# Patient Record
Sex: FEMALE | Race: WHITE | URBAN - METROPOLITAN AREA
[De-identification: names, ages, dates, MRNs, and addresses within clinical notes are randomized per-mention and may not be internally consistent; named-entity substitution may affect disease eponyms.]

---

## 2012-01-12 LAB — LEFT VENTRICULAR EJECTION FRACTION, EXTERNAL: 65

## 2017-01-09 RX ORDER — GABAPENTIN 300 MG/1
CAPSULE ORAL
Qty: 180 CAPSULE | Refills: 2 | Status: SHIPPED | OUTPATIENT
Start: 2017-01-09 | End: 2017-04-07 | Stop reason: SDUPTHER

## 2017-01-17 ENCOUNTER — TELEPHONE (OUTPATIENT)
Dept: FAMILY MEDICINE CLINIC | Age: 82
End: 2017-01-17

## 2017-01-20 ENCOUNTER — TELEPHONE (OUTPATIENT)
Dept: FAMILY MEDICINE CLINIC | Age: 82
End: 2017-01-20

## 2017-01-20 ENCOUNTER — HOSPITAL ENCOUNTER (OUTPATIENT)
Dept: VASCULAR LAB | Age: 82
Discharge: HOME | End: 2017-01-20
Attending: FAMILY MEDICINE | Admitting: FAMILY MEDICINE

## 2017-01-20 DIAGNOSIS — M79.605 PAIN OF LEFT LEG: ICD-10-CM

## 2017-01-20 DIAGNOSIS — M79.605 PAIN IN BOTH LOWER EXTREMITIES: Primary | ICD-10-CM

## 2017-01-20 DIAGNOSIS — M79.604 PAIN IN BOTH LOWER EXTREMITIES: Primary | ICD-10-CM

## 2017-01-20 DIAGNOSIS — I73.9 CLAUDICATION (HCC): Primary | ICD-10-CM

## 2017-01-20 DIAGNOSIS — I73.9 CLAUDICATION, INTERMITTENT (HCC): ICD-10-CM

## 2017-04-07 RX ORDER — GABAPENTIN 300 MG/1
CAPSULE ORAL
Qty: 180 CAPSULE | Refills: 1 | Status: SHIPPED | OUTPATIENT
Start: 2017-04-07 | End: 2017-06-15 | Stop reason: SDUPTHER

## 2017-05-03 RX ORDER — TEMAZEPAM 15 MG/1
CAPSULE ORAL
Qty: 30 CAPSULE | Refills: 1 | OUTPATIENT
Start: 2017-05-03 | End: 2017-07-09 | Stop reason: SDUPTHER

## 2017-05-04 ENCOUNTER — TELEPHONE (OUTPATIENT)
Dept: FAMILY MEDICINE CLINIC | Age: 82
End: 2017-05-04

## 2017-05-29 ENCOUNTER — PATIENT MESSAGE (OUTPATIENT)
Dept: RHEUMATOLOGY | Age: 82
End: 2017-05-29

## 2017-06-15 ENCOUNTER — OFFICE VISIT (OUTPATIENT)
Dept: RHEUMATOLOGY | Age: 82
End: 2017-06-15

## 2017-06-15 VITALS
WEIGHT: 169.4 LBS | BODY MASS INDEX: 28.54 KG/M2 | DIASTOLIC BLOOD PRESSURE: 78 MMHG | HEART RATE: 58 BPM | SYSTOLIC BLOOD PRESSURE: 118 MMHG

## 2017-06-15 DIAGNOSIS — M15.9 OSTEOARTHRITIS OF MULTIPLE JOINTS, UNSPECIFIED OSTEOARTHRITIS TYPE: ICD-10-CM

## 2017-06-15 DIAGNOSIS — G60.9 IDIOPATHIC PERIPHERAL NEUROPATHY: Primary | ICD-10-CM

## 2017-06-15 PROCEDURE — 99213 OFFICE O/P EST LOW 20 MIN: CPT | Performed by: INTERNAL MEDICINE

## 2017-06-15 RX ORDER — GABAPENTIN 300 MG/1
600 CAPSULE ORAL 3 TIMES DAILY
Qty: 180 CAPSULE | Refills: 5 | Status: SHIPPED | OUTPATIENT
Start: 2017-06-15 | End: 2018-01-15 | Stop reason: SDUPTHER

## 2017-06-15 RX ORDER — GABAPENTIN 100 MG/1
200 CAPSULE ORAL 3 TIMES DAILY
Qty: 180 CAPSULE | Refills: 5 | Status: SHIPPED | OUTPATIENT
Start: 2017-06-15 | End: 2017-09-01 | Stop reason: DRUGHIGH

## 2017-06-15 RX ORDER — DEXAMETHASONE 0.5 MG/5ML
ELIXIR ORAL 3 TIMES DAILY
COMMUNITY
End: 2017-12-07 | Stop reason: ALTCHOICE

## 2017-07-06 ENCOUNTER — TELEPHONE (OUTPATIENT)
Dept: FAMILY MEDICINE CLINIC | Age: 82
End: 2017-07-06

## 2017-07-13 RX ORDER — TEMAZEPAM 15 MG/1
CAPSULE ORAL
Qty: 30 CAPSULE | Refills: 0 | OUTPATIENT
Start: 2017-07-13 | End: 2017-09-01 | Stop reason: SDUPTHER

## 2017-07-24 ENCOUNTER — OFFICE VISIT (OUTPATIENT)
Dept: RHEUMATOLOGY | Age: 82
End: 2017-07-24

## 2017-07-24 VITALS
HEART RATE: 72 BPM | WEIGHT: 173.38 LBS | DIASTOLIC BLOOD PRESSURE: 76 MMHG | SYSTOLIC BLOOD PRESSURE: 130 MMHG | HEIGHT: 65 IN | BODY MASS INDEX: 28.89 KG/M2

## 2017-07-24 DIAGNOSIS — M15.9 OSTEOARTHRITIS OF MULTIPLE JOINTS, UNSPECIFIED OSTEOARTHRITIS TYPE: Primary | ICD-10-CM

## 2017-07-24 DIAGNOSIS — G60.9 IDIOPATHIC PERIPHERAL NEUROPATHY: ICD-10-CM

## 2017-07-24 PROCEDURE — 99213 OFFICE O/P EST LOW 20 MIN: CPT | Performed by: INTERNAL MEDICINE

## 2017-08-07 ENCOUNTER — PATIENT MESSAGE (OUTPATIENT)
Dept: RHEUMATOLOGY | Age: 82
End: 2017-08-07

## 2017-08-08 RX ORDER — DULOXETIN HYDROCHLORIDE 30 MG/1
30 CAPSULE, DELAYED RELEASE ORAL DAILY
Qty: 30 CAPSULE | Refills: 3 | Status: SHIPPED | OUTPATIENT
Start: 2017-08-08 | End: 2017-08-25 | Stop reason: SDUPTHER

## 2017-08-17 ENCOUNTER — PATIENT MESSAGE (OUTPATIENT)
Dept: RHEUMATOLOGY | Age: 82
End: 2017-08-17

## 2017-08-24 ENCOUNTER — PATIENT MESSAGE (OUTPATIENT)
Dept: RHEUMATOLOGY | Age: 82
End: 2017-08-24

## 2017-08-25 RX ORDER — DULOXETIN HYDROCHLORIDE 60 MG/1
60 CAPSULE, DELAYED RELEASE ORAL DAILY
Qty: 30 CAPSULE | Refills: 1 | Status: SHIPPED | OUTPATIENT
Start: 2017-08-25 | End: 2017-10-25 | Stop reason: SDUPTHER

## 2017-09-12 ENCOUNTER — HOSPITAL ENCOUNTER (OUTPATIENT)
Dept: OTHER | Age: 82
Discharge: HOME | End: 2017-09-12
Attending: FAMILY MEDICINE | Admitting: FAMILY MEDICINE

## 2017-09-12 DIAGNOSIS — G62.9 NEUROPATHY: ICD-10-CM

## 2017-09-17 ENCOUNTER — PATIENT MESSAGE (OUTPATIENT)
Dept: RHEUMATOLOGY | Age: 82
End: 2017-09-17

## 2017-10-11 ENCOUNTER — PATIENT MESSAGE (OUTPATIENT)
Dept: RHEUMATOLOGY | Age: 82
End: 2017-10-11

## 2017-10-11 DIAGNOSIS — M25.562 CHRONIC PAIN OF LEFT KNEE: Primary | ICD-10-CM

## 2017-10-11 DIAGNOSIS — G89.29 CHRONIC PAIN OF LEFT KNEE: Primary | ICD-10-CM

## 2017-10-11 NOTE — TELEPHONE ENCOUNTER
From: Lorelei Meals  To: Eli Denver, MD  Sent: 10/11/2017 2:29 PM EDT  Subject: Non-Urgent Medical Question     Good and bad   The good is that my legs and ankles have very little pain since i have been taking 60mg of Cymbalta a day  But my legs, ankles and feet still swell at times, but do not hurt   The bad is that I have pain on the right side of my left knee. Hurts especially when I first stand up to walk,   but as I walk the pain goes away a little bit. What can I do about the swelling in my legs? What can I do for my knee?    Thank you, Brooklyn So

## 2017-10-17 ENCOUNTER — HOSPITAL ENCOUNTER (OUTPATIENT)
Dept: OTHER | Age: 82
Discharge: HOME | End: 2017-10-17
Attending: INTERNAL MEDICINE | Admitting: INTERNAL MEDICINE

## 2017-10-17 DIAGNOSIS — M25.562 CHRONIC PAIN OF LEFT KNEE: ICD-10-CM

## 2017-10-17 DIAGNOSIS — G89.29 CHRONIC PAIN OF LEFT KNEE: ICD-10-CM

## 2017-10-25 RX ORDER — DULOXETIN HYDROCHLORIDE 60 MG/1
CAPSULE, DELAYED RELEASE ORAL
Qty: 30 CAPSULE | Refills: 1 | Status: SHIPPED | OUTPATIENT
Start: 2017-10-25 | End: 2017-12-13 | Stop reason: SDUPTHER

## 2017-11-30 RX ORDER — LORAZEPAM 0.5 MG/1
TABLET ORAL
Qty: 60 TABLET | Refills: 0 | OUTPATIENT
Start: 2017-11-30 | End: 2018-01-03 | Stop reason: SDUPTHER

## 2017-11-30 RX ORDER — TEMAZEPAM 15 MG/1
CAPSULE ORAL
Qty: 30 CAPSULE | Refills: 0 | OUTPATIENT
Start: 2017-11-30 | End: 2018-01-03 | Stop reason: SDUPTHER

## 2017-12-02 ENCOUNTER — TELEPHONE (OUTPATIENT)
Dept: FAMILY MEDICINE CLINIC | Age: 82
End: 2017-12-02

## 2017-12-02 RX ORDER — LATANOPROST 50 UG/ML
1 SOLUTION/ DROPS OPHTHALMIC NIGHTLY
Qty: 1 BOTTLE | Refills: 1 | Status: SHIPPED | OUTPATIENT
Start: 2017-12-02 | End: 2021-06-15 | Stop reason: ALTCHOICE

## 2017-12-02 RX ORDER — LORAZEPAM 0.5 MG/1
TABLET ORAL
Qty: 180 TABLET | Refills: 0 | Status: CANCELLED | OUTPATIENT
Start: 2017-12-02

## 2017-12-02 RX ORDER — TIMOLOL MALEATE 6.8 MG/ML
1 SOLUTION/ DROPS OPHTHALMIC DAILY
Qty: 1 BOTTLE | Refills: 1 | Status: SHIPPED | OUTPATIENT
Start: 2017-12-02 | End: 2018-05-17 | Stop reason: SDUPTHER

## 2017-12-02 RX ORDER — TEMAZEPAM 15 MG/1
CAPSULE ORAL
Qty: 90 CAPSULE | Refills: 0 | Status: CANCELLED | OUTPATIENT
Start: 2017-12-02

## 2017-12-07 ENCOUNTER — OFFICE VISIT (OUTPATIENT)
Dept: FAMILY MEDICINE CLINIC | Age: 82
End: 2017-12-07

## 2017-12-07 VITALS
HEART RATE: 62 BPM | DIASTOLIC BLOOD PRESSURE: 74 MMHG | SYSTOLIC BLOOD PRESSURE: 150 MMHG | WEIGHT: 166.8 LBS | BODY MASS INDEX: 28.1 KG/M2 | OXYGEN SATURATION: 96 %

## 2017-12-07 DIAGNOSIS — I10 ESSENTIAL HYPERTENSION: Primary | ICD-10-CM

## 2017-12-07 DIAGNOSIS — M15.9 OSTEOARTHRITIS OF MULTIPLE JOINTS, UNSPECIFIED OSTEOARTHRITIS TYPE: ICD-10-CM

## 2017-12-07 DIAGNOSIS — I48.19 PERSISTENT ATRIAL FIBRILLATION (HCC): ICD-10-CM

## 2017-12-07 DIAGNOSIS — H91.90 HEARING LOSS, UNSPECIFIED HEARING LOSS TYPE, UNSPECIFIED LATERALITY: ICD-10-CM

## 2017-12-07 DIAGNOSIS — E78.5 HYPERLIPIDEMIA, UNSPECIFIED HYPERLIPIDEMIA TYPE: ICD-10-CM

## 2017-12-07 PROCEDURE — 99214 OFFICE O/P EST MOD 30 MIN: CPT | Performed by: FAMILY MEDICINE

## 2017-12-07 RX ORDER — BRIMONIDINE TARTRATE 2 MG/ML
SOLUTION/ DROPS OPHTHALMIC
COMMUNITY
Start: 2017-10-26 | End: 2018-09-06

## 2017-12-07 ASSESSMENT — PATIENT HEALTH QUESTIONNAIRE - PHQ9
1. LITTLE INTEREST OR PLEASURE IN DOING THINGS: 0
2. FEELING DOWN, DEPRESSED OR HOPELESS: 0
SUM OF ALL RESPONSES TO PHQ9 QUESTIONS 1 & 2: 0
SUM OF ALL RESPONSES TO PHQ QUESTIONS 1-9: 0

## 2017-12-07 NOTE — PROGRESS NOTES
Patient is here for a follow-up with a concern about knee pain. Patient states that she has pain in both knee for two months. Patient is currently taking tylenol that provides no relief.
release capsule TAKE ONE CAPSULE BY MOUTH DAILY 30 capsule 1    PRADAXA 150 MG capsule TAKE 1 CAPSULE TWICE DAILY 180 capsule 0    amLODIPine (NORVASC) 5 MG tablet TAKE 1 TABLET DAILY 90 tablet 0    gabapentin (NEURONTIN) 300 MG capsule Take 2 capsules by mouth 3 times daily (Patient taking differently: Take 800 mg by mouth 3 times daily ) 180 capsule 5    triamterene-hydrochlorothiazide (MAXZIDE) 75-50 MG per tablet TAKE 1 TABLET DAILY 90 tablet 1    pravastatin (PRAVACHOL) 10 MG tablet TAKE ONE TABLET BY MOUTH DAILY 90 tablet 3    dorzolamide (TRUSOPT) 2 % ophthalmic solution Place 1 drop into the left eye 2 times daily      potassium chloride (MICRO-K) 10 MEQ extended release capsule TAKE 1 CAPSULE TWICE DAILY 180 capsule 0    acetaminophen (TYLENOL) 500 MG tablet Take 1,000 mg by mouth three times daily      TURMERIC PO Take 1 tablet by mouth daily. 800 mg      Alpha-Lipoic Acid 300 MG TABS Take 2 tablets by mouth 2 times daily.  Magnesium 500 MG TABS Take 1 tablet by mouth daily.  Lutein 40 MG CAPS Take 1 capsule by mouth daily.  cetirizine (ZYRTEC) 10 MG tablet Take 10 mg by mouth daily.  Jodee 500 MG CAPS Take 1 capsule by mouth daily.  Ascorbic Acid (VITAMIN C) 500 MG CAPS Take 1 capsule by mouth daily.  Glucosamine-Chondroit-Vit C-Mn (GLUCOSAMINE CHONDR 500 COMPLEX PO) Take  by mouth.  VITAMIN D, CHOLECALCIFEROL, PO Take 1 tablet by mouth daily. 1000 mg      Omega-3 Fatty Acids (FISH OIL) 1000 MG CAPS Take 1,000 mg by mouth daily.  L-LYSINE HCL Take 1 tablet by mouth daily. 500 mg      CRANBERRY Take 1 tablet by mouth daily. 4200 mg      Coenzyme Q10 (COQ-10 PO) Take  by mouth.  CHROMIUM PICOLINATE Take 1 tablet by mouth daily. 200 mcg      MULTIPLE VITAMIN PO Take  by mouth.            Allergies   Allergen Reactions    Ace Inhibitors     Catapres [Clonidine Hcl]      patch    Diovan [Valsartan]      ineffective    Eszopiclone      ineffective

## 2017-12-13 ENCOUNTER — OFFICE VISIT (OUTPATIENT)
Dept: RHEUMATOLOGY | Age: 82
End: 2017-12-13

## 2017-12-13 VITALS
HEART RATE: 64 BPM | BODY MASS INDEX: 27.82 KG/M2 | WEIGHT: 167 LBS | SYSTOLIC BLOOD PRESSURE: 142 MMHG | HEIGHT: 65 IN | DIASTOLIC BLOOD PRESSURE: 70 MMHG

## 2017-12-13 DIAGNOSIS — G90.09 PERIPHERAL AUTONOMIC NEUROPATHY OF UNKNOWN CAUSE: Primary | ICD-10-CM

## 2017-12-13 DIAGNOSIS — M15.9 OSTEOARTHRITIS OF MULTIPLE JOINTS, UNSPECIFIED OSTEOARTHRITIS TYPE: ICD-10-CM

## 2017-12-13 PROCEDURE — 99213 OFFICE O/P EST LOW 20 MIN: CPT | Performed by: INTERNAL MEDICINE

## 2017-12-13 RX ORDER — DULOXETIN HYDROCHLORIDE 60 MG/1
60 CAPSULE, DELAYED RELEASE ORAL DAILY
Qty: 90 CAPSULE | Refills: 1 | Status: SHIPPED | OUTPATIENT
Start: 2017-12-13 | End: 2018-05-05 | Stop reason: SDUPTHER

## 2017-12-13 NOTE — PROGRESS NOTES
Subjective:      Patient ID: Viola Estevez is a 80 y.o. female. HPI  The patient returns for follow-up of osteoarthritis and peripheral neuropathy. She's doing better on Cymbalta 60 mg a day along with Neurontin 1200 mg a day. Review of Systems  Occasional right thumb pain. Prior to Visit Medications    Medication Sig Taking? Authorizing Provider   amLODIPine (NORVASC) 5 MG tablet TAKE 1 TABLET DAILY Yes Jason Dove MD   DULoxetine (CYMBALTA) 60 MG extended release capsule Take 1 capsule by mouth daily Yes Bhumika Deleon MD   brimonidine (ALPHAGAN) 0.2 % ophthalmic solution  Yes Historical Provider, MD   apixaban (ELIQUIS) 2.5 MG TABS tablet Take 1 tablet by mouth 2 times daily Yes Steele Severe, MD   Timolol (ISTALOL) 0.5 % (DAILY) SOLN ophthalmic solution Apply 1 drop to eye daily Left eye Yes Jason Dove MD   latanoprost (XALATAN) 0.005 % ophthalmic solution Place 1 drop into the left eye nightly Yes Jason Dove MD   temazepam (RESTORIL) 15 MG capsule TAKE ONE CAPSULE BY MOUTH EVERY NIGHT AT BEDTIME. Yes Jason Dove MD   LORazepam (ATIVAN) 0.5 MG tablet TAKE ONE TABLET BY MOUTH TWICE A DAY AS NEEDED.  Yes Jason Dove MD   KLOR-CON SPRINKLE 10 MEQ extended release capsule TAKE 1 CAPSULE TWICE DAILY (DUE FOR APPOINTMENT) Yes Jason Dove MD   metoprolol tartrate (LOPRESSOR) 25 MG tablet TAKE 1 TABLET TWICE A DAY Yes Jason Dove MD   gabapentin (NEURONTIN) 300 MG capsule Take 2 capsules by mouth 3 times daily  Patient taking differently: Take 800 mg by mouth 3 times daily Taking 1200 mg daily Yes Bhumika Deleon MD   triamterene-hydrochlorothiazide (MAXZIDE) 75-50 MG per tablet TAKE 1 TABLET DAILY Yes Jason Dove MD   pravastatin (PRAVACHOL) 10 MG tablet TAKE ONE TABLET BY MOUTH DAILY Yes Jason Dove MD   dorzolamide (TRUSOPT) 2 % ophthalmic solution Place 1 drop into the left eye 2 times daily Yes Historical Provider, MD   potassium chloride (MICRO-K) 10 MEQ extended release capsule TAKE 1 CAPSULE TWICE DAILY Yes Traci Banuelos MD   acetaminophen (TYLENOL) 500 MG tablet Take 1,000 mg by mouth three times daily Yes Historical Provider, MD   TURMERIC PO Take 1 tablet by mouth daily. 800 mg Yes Historical Provider, MD   Alpha-Lipoic Acid 300 MG TABS Take 2 tablets by mouth 2 times daily. Yes Historical Provider, MD   Magnesium 500 MG TABS Take 1 tablet by mouth daily. Yes Historical Provider, MD   Lutein 40 MG CAPS Take 1 capsule by mouth daily. Yes Historical Provider, MD   cetirizine (ZYRTEC) 10 MG tablet Take 10 mg by mouth daily. Yes Historical Provider, MD   Jodee 500 MG CAPS Take 1 capsule by mouth daily. Yes Historical Provider, MD   Ascorbic Acid (VITAMIN C) 500 MG CAPS Take 1 capsule by mouth daily. Yes Historical Provider, MD   Glucosamine-Chondroit-Vit C-Mn (GLUCOSAMINE CHONDR 500 COMPLEX PO) Take  by mouth. Yes Historical Provider, MD   VITAMIN D, CHOLECALCIFEROL, PO Take 1 tablet by mouth daily. 1000 mg Yes Historical Provider, MD   Omega-3 Fatty Acids (FISH OIL) 1000 MG CAPS Take 1,000 mg by mouth daily. Yes Historical Provider, MD   L-LYSINE HCL Take 1 tablet by mouth daily. 500 mg Yes Historical Provider, MD   CRANBERRY Take 1 tablet by mouth daily. 4200 mg Yes Historical Provider, MD   Coenzyme Q10 (COQ-10 PO) Take  by mouth. Yes Historical Provider, MD   CHROMIUM PICOLINATE Take 1 tablet by mouth daily. 200 mcg Yes Historical Provider, MD   MULTIPLE VITAMIN PO Take  by mouth. Yes Historical Provider, MD       Objective:   Physical Exam  BP (!) 154/72   Pulse 64   Ht 5' 4.6\" (1.641 m)   Wt 167 lb (75.8 kg)   BMI 28.14 kg/m²   Alert female in no acute distress prominent osteoarthritic changes DIPs and PIPs minimal crepitus at both knees  Assessment:      Osteoarthritis. Peripheral neuropathy      Plan:      The patient will continue current medication. I'll see the patient back in 6 months time.

## 2018-01-15 RX ORDER — GABAPENTIN 300 MG/1
CAPSULE ORAL
Qty: 180 CAPSULE | Refills: 5 | Status: SHIPPED | OUTPATIENT
Start: 2018-01-15 | End: 2018-04-10 | Stop reason: CLARIF

## 2018-02-09 ENCOUNTER — HOSPITAL ENCOUNTER (OUTPATIENT)
Dept: OTHER | Age: 83
Discharge: HOME | End: 2018-02-09
Attending: FAMILY MEDICINE | Admitting: FAMILY MEDICINE

## 2018-02-09 ENCOUNTER — OFFICE VISIT (OUTPATIENT)
Dept: FAMILY MEDICINE CLINIC | Age: 83
End: 2018-02-09

## 2018-02-09 VITALS
DIASTOLIC BLOOD PRESSURE: 78 MMHG | OXYGEN SATURATION: 96 % | RESPIRATION RATE: 12 BRPM | HEART RATE: 74 BPM | WEIGHT: 186 LBS | SYSTOLIC BLOOD PRESSURE: 136 MMHG | BODY MASS INDEX: 30.95 KG/M2

## 2018-02-09 DIAGNOSIS — S92.355A CLOSED NONDISPLACED FRACTURE OF FIFTH METATARSAL BONE OF LEFT FOOT, INITIAL ENCOUNTER: Primary | ICD-10-CM

## 2018-02-09 DIAGNOSIS — S00.03XA CONTUSION OF SCALP, INITIAL ENCOUNTER: ICD-10-CM

## 2018-02-09 DIAGNOSIS — S96.912A STRAIN OF LEFT FOOT, INITIAL ENCOUNTER: Primary | ICD-10-CM

## 2018-02-09 DIAGNOSIS — S96.912A STRAIN OF LEFT FOOT, INITIAL ENCOUNTER: ICD-10-CM

## 2018-02-09 PROCEDURE — 99213 OFFICE O/P EST LOW 20 MIN: CPT | Performed by: FAMILY MEDICINE

## 2018-02-09 NOTE — PROGRESS NOTES
Subjective:      Patient ID: Michelle Fontanez is a 80 y.o. female.     HPI    Review of Systems    Objective:   Physical Exam    Assessment:      ***      Plan:      ***

## 2018-02-10 ENCOUNTER — TELEPHONE (OUTPATIENT)
Dept: FAMILY MEDICINE CLINIC | Age: 83
End: 2018-02-10

## 2018-02-12 DIAGNOSIS — F41.9 ANXIETY: ICD-10-CM

## 2018-02-12 RX ORDER — TEMAZEPAM 15 MG/1
CAPSULE ORAL
Qty: 30 CAPSULE | Refills: 0 | Status: SHIPPED | OUTPATIENT
Start: 2018-02-12 | End: 2018-03-22 | Stop reason: SDUPTHER

## 2018-02-28 DIAGNOSIS — F41.9 ANXIETY: ICD-10-CM

## 2018-03-01 RX ORDER — LORAZEPAM 0.5 MG/1
TABLET ORAL
Qty: 60 TABLET | Refills: 0 | Status: SHIPPED | OUTPATIENT
Start: 2018-03-01 | End: 2018-03-07

## 2018-03-03 ENCOUNTER — PATIENT MESSAGE (OUTPATIENT)
Dept: FAMILY MEDICINE CLINIC | Age: 83
End: 2018-03-03

## 2018-03-05 NOTE — TELEPHONE ENCOUNTER
From: Ronaldo Brice  To: Katelin Rodriguez MD  Sent: 3/3/2018 3:36 PM EST  Subject: Non-Urgent Medical Question    Was cutting my toe nails, and cut the skin, some bleeding,next day my nurse friend dressed it very good. She thinks I should have a tetnus shot, I'm not so sure, it was in my house. What do you think?  Thank you, Zeny Medina

## 2018-03-07 ENCOUNTER — HOSPITAL ENCOUNTER (OUTPATIENT)
Dept: OTHER | Age: 83
Discharge: HOME | End: 2018-03-07
Attending: FAMILY MEDICINE | Admitting: FAMILY MEDICINE

## 2018-03-07 ENCOUNTER — OFFICE VISIT (OUTPATIENT)
Dept: FAMILY MEDICINE CLINIC | Age: 83
End: 2018-03-07

## 2018-03-07 VITALS
BODY MASS INDEX: 28.06 KG/M2 | SYSTOLIC BLOOD PRESSURE: 150 MMHG | OXYGEN SATURATION: 96 % | HEART RATE: 64 BPM | DIASTOLIC BLOOD PRESSURE: 82 MMHG | WEIGHT: 168.6 LBS

## 2018-03-07 DIAGNOSIS — S91.114A: ICD-10-CM

## 2018-03-07 DIAGNOSIS — G47.10 HYPERSOMNOLENCE: ICD-10-CM

## 2018-03-07 DIAGNOSIS — S92.902D CLOSED FRACTURE OF LEFT FOOT WITH ROUTINE HEALING, SUBSEQUENT ENCOUNTER: Primary | ICD-10-CM

## 2018-03-07 DIAGNOSIS — S92.355A CLOSED NONDISPLACED FRACTURE OF FIFTH METATARSAL BONE OF LEFT FOOT, INITIAL ENCOUNTER: ICD-10-CM

## 2018-03-07 LAB
A/G RATIO: 1.2 (ref 1.1–2.2)
ALBUMIN SERPL-MCNC: 4 G/DL (ref 3.4–5)
ALP BLD-CCNC: 117 U/L (ref 40–129)
ALT SERPL-CCNC: 13 U/L (ref 10–40)
ANION GAP SERPL CALCULATED.3IONS-SCNC: 11 MMOL/L (ref 3–16)
AST SERPL-CCNC: 24 U/L (ref 15–37)
BILIRUB SERPL-MCNC: 0.5 MG/DL (ref 0–1)
BUN BLDV-MCNC: 20 MG/DL (ref 7–20)
CALCIUM SERPL-MCNC: 8.9 MG/DL (ref 8.3–10.6)
CHLORIDE BLD-SCNC: 110 MMOL/L (ref 99–110)
CO2: 28 MMOL/L (ref 21–32)
CREAT SERPL-MCNC: 0.5 MG/DL (ref 0.6–1.2)
GFR AFRICAN AMERICAN: >60
GFR NON-AFRICAN AMERICAN: >60
GLOBULIN: 3.4 G/DL
GLUCOSE BLD-MCNC: 128 MG/DL (ref 70–99)
HCT VFR BLD CALC: 38.7 % (ref 36–48)
HEMOGLOBIN: 13.3 G/DL (ref 12–16)
MCH RBC QN AUTO: 32.4 PG (ref 26–34)
MCHC RBC AUTO-ENTMCNC: 34.4 G/DL (ref 31–36)
MCV RBC AUTO: 94.2 FL (ref 80–100)
PDW BLD-RTO: 13.7 % (ref 12.4–15.4)
PLATELET # BLD: 186 K/UL (ref 135–450)
PMV BLD AUTO: 10.1 FL (ref 5–10.5)
POTASSIUM SERPL-SCNC: 4.3 MMOL/L (ref 3.5–5.1)
RBC # BLD: 4.11 M/UL (ref 4–5.2)
SODIUM BLD-SCNC: 149 MMOL/L (ref 136–145)
TOTAL PROTEIN: 7.4 G/DL (ref 6.4–8.2)
WBC # BLD: 9.3 K/UL (ref 4–11)

## 2018-03-07 PROCEDURE — 90714 TD VACC NO PRESV 7 YRS+ IM: CPT | Performed by: FAMILY MEDICINE

## 2018-03-07 PROCEDURE — 90471 IMMUNIZATION ADMIN: CPT | Performed by: FAMILY MEDICINE

## 2018-03-07 PROCEDURE — 99214 OFFICE O/P EST MOD 30 MIN: CPT | Performed by: FAMILY MEDICINE

## 2018-03-07 NOTE — PROGRESS NOTES
Subjective:      Patient ID: Bobo Maldonado is a 80 y.o. female. HPI patient presents in accompaniment of son. Patient was to have a repeat x-ray of her left foot fracture. She continued with her boot. Patient is having issues with feeling very forgetful, sleeping all the time, feeling very tired, no energy, and not wanting to do things since her fall. Patient is also having some blurred vision too. Patient typically goes to bed about 10 PM and at a.m. awakens with some eyedrops in an angle back to sleep for another couple hours. Patient takes both lorazepam and temazepam at bedtime. Patient states she's been on these medications for a number of years. She also takes a daytime nap or several hours. Patient cut her right 2nd toe about one week ago. Patient states the toe is healing and doing well. She is requesting a tetanus injection. Review of Systems     Allergies   Allergen Reactions    Ace Inhibitors     Catapres [Clonidine Hcl]      patch    Diovan [Valsartan]      ineffective    Eszopiclone      ineffective    Morphine      All derivatives- GI upset         Objective:   Physical Exam   Constitutional: She is oriented to person, place, and time. She appears well-developed and well-nourished. No distress. Cardiovascular: Normal rate and normal heart sounds. No murmur heard. Pulmonary/Chest: Effort normal and breath sounds normal.   Neurological: She is alert and oriented to person, place, and time. Skin:   Left second toe with flap-like laceration that appears to be healing well. Psychiatric: She has a normal mood and affect. Her behavior is normal. Judgment and thought content normal.       Assessment:      Alton Bailon was seen today for fatigue.     Diagnoses and all orders for this visit:    Closed fracture of left foot with routine healing, subsequent encounter  -     Td vaccine preservative free greater than or equal to 6yo IM    Laceration of lesser toe of right foot without foreign body without damage to nail, initial encounter   -     Td vaccine preservative free greater than or equal to 6yo IM    Hypersomnolence            Plan:      Proceed with x-ray of the foot and laboratory evaluation  Patient is in agreement to stop lorazepam but she would like to maintain temazepam  Discussed with patient and son that she is overmedicated especially for her age with benzodiazepine medication and it would really be best that she not be on either of these medications. Patient does have a follow-up appointment later this week to review her health   Encourage podiatry for toenail trimming  Total time of consultation 25 minutes. Please note that this chart was generated using Dragon dictation software. Although every effort was made to ensure the accuracy of this automated transcription, some errors in transcription may have occurred.

## 2018-03-22 ENCOUNTER — OFFICE VISIT (OUTPATIENT)
Dept: FAMILY MEDICINE CLINIC | Age: 83
End: 2018-03-22

## 2018-03-22 VITALS
BODY MASS INDEX: 27.72 KG/M2 | DIASTOLIC BLOOD PRESSURE: 78 MMHG | OXYGEN SATURATION: 96 % | SYSTOLIC BLOOD PRESSURE: 152 MMHG | HEART RATE: 72 BPM | WEIGHT: 166.6 LBS

## 2018-03-22 DIAGNOSIS — G47.00 INSOMNIA, UNSPECIFIED TYPE: ICD-10-CM
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
## 2018-03-22 DIAGNOSIS — G62.9 NEUROPATHY: ICD-10-CM

## 2018-03-22 DIAGNOSIS — R44.3 HALLUCINATIONS: ICD-10-CM

## 2018-03-22 DIAGNOSIS — Z79.899 POLYPHARMACY: Primary | ICD-10-CM
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
## 2018-03-22 DIAGNOSIS — F41.9 ANXIETY: ICD-10-CM

## 2018-03-22 PROCEDURE — 99213 OFFICE O/P EST LOW 20 MIN: CPT | Performed by: FAMILY MEDICINE

## 2018-03-22 RX ORDER — TEMAZEPAM 15 MG/1
CAPSULE ORAL
Qty: 30 CAPSULE | Refills: 1 | Status: SHIPPED | OUTPATIENT
Start: 2018-03-22 | End: 2018-05-21 | Stop reason: SDUPTHER

## 2018-03-22 NOTE — PROGRESS NOTES
Subjective:      Patient ID: Hussain Krueger is a 80 y.o. female. HPI   Patient presents today to discuss medications. She is concerned that she is on too many medications. Stopped lorazepam 3/7/2018. Able to sleep taking restoril alone. Not taking any lorazepam during the day. On Neurontin 300mg q am and 300mg q pm and 600mg nightly bid for neuropathy. Previously managed by rheumatology. Also on Cymbalta 60mg nightly for neuropathy. Concerned because she thinks Cymbalta is giving her hallucinations. Has had hallucinations of \"shadows\" over the last few months. Sometimes feels like \"there are people in the room\" and sometimes thinks that there is an upstairs to her house but knows that there is not. Has been having more falls at home and son is concerned that Neurontin and Cymbalta are contributing. Feet actually feel better since wearing supportive shoes around the house. Mouth gets very dry.       Review of Systems    Patient Active Problem List   Diagnosis    Herniated nucleus pulposus with myelopathy, cervical    Spinal stenosis of lumbar region    Eustachian tube disorder    Carpal tunnel syndrome    Glaucoma    Essential hypertension    Osteoarthritis    Glaucoma    Hyperlipidemia    Dizziness    Peripheral autonomic neuropathy of unknown cause    Atrial fibrillation (HCC)    [place] (hard of hearing)       Outpatient Prescriptions Marked as Taking for the 3/22/18 encounter (Office Visit) with Margie Pepe MD   Medication Sig Dispense Refill    KLOR-CON SPRINKLE 10 MEQ extended release capsule TAKE 1 CAPSULE TWICE DAILY (DUE FOR APPOINTMENT) 180 capsule 1    metoprolol tartrate (LOPRESSOR) 25 MG tablet TAKE 1 TABLET TWICE A  tablet 1    pravastatin (PRAVACHOL) 10 MG tablet TAKE ONE TABLET BY MOUTH DAILY 90 tablet 1    gabapentin (NEURONTIN) 300 MG capsule TAKE TWO CAPSULES BY MOUTH THREE TIMES A DAY (Patient taking differently: Taking 1 tablet two times daily and two tablets at bedtime) 180 capsule 5    amLODIPine (NORVASC) 5 MG tablet TAKE 1 TABLET DAILY 90 tablet 3    DULoxetine (CYMBALTA) 60 MG extended release capsule Take 1 capsule by mouth daily 90 capsule 1    brimonidine (ALPHAGAN) 0.2 % ophthalmic solution       apixaban (ELIQUIS) 2.5 MG TABS tablet Take 1 tablet by mouth 2 times daily 180 tablet 3    Timolol (ISTALOL) 0.5 % (DAILY) SOLN ophthalmic solution Apply 1 drop to eye daily Left eye 1 Bottle 1    latanoprost (XALATAN) 0.005 % ophthalmic solution Place 1 drop into the left eye nightly 1 Bottle 1    triamterene-hydrochlorothiazide (MAXZIDE) 75-50 MG per tablet TAKE 1 TABLET DAILY 90 tablet 1    dorzolamide (TRUSOPT) 2 % ophthalmic solution Place 1 drop into the left eye 2 times daily      acetaminophen (TYLENOL) 500 MG tablet Take 1,000 mg by mouth three times daily      TURMERIC PO Take 1 tablet by mouth daily. 800 mg      Alpha-Lipoic Acid 300 MG TABS Take 2 tablets by mouth 2 times daily.  Magnesium 500 MG TABS Take 1 tablet by mouth daily.  Lutein 40 MG CAPS Take 1 capsule by mouth daily.  Jodee 500 MG CAPS Take 1 capsule by mouth daily.  Ascorbic Acid (VITAMIN C) 500 MG CAPS Take 1 capsule by mouth daily.  Glucosamine-Chondroit-Vit C-Mn (GLUCOSAMINE CHONDR 500 COMPLEX PO) Take  by mouth.  VITAMIN D, CHOLECALCIFEROL, PO Take 1 tablet by mouth daily. 1000 mg      Omega-3 Fatty Acids (FISH OIL) 1000 MG CAPS Take 1,000 mg by mouth daily.  L-LYSINE HCL Take 1 tablet by mouth daily. 500 mg      CRANBERRY Take 1 tablet by mouth daily. 4200 mg      Coenzyme Q10 (COQ-10 PO) Take  by mouth.  CHROMIUM PICOLINATE Take 1 tablet by mouth daily. 200 mcg      MULTIPLE VITAMIN PO Take  by mouth.            Allergies   Allergen Reactions    Ace Inhibitors     Catapres [Clonidine Hcl]      patch    Diovan [Valsartan]      ineffective    Eszopiclone      ineffective    Morphine      All derivatives- GI

## 2018-03-29 ENCOUNTER — PATIENT MESSAGE (OUTPATIENT)
Dept: FAMILY MEDICINE CLINIC | Age: 83
End: 2018-03-29

## 2018-03-29 NOTE — TELEPHONE ENCOUNTER
Suspect that this is due to tapering off of the gabapentin. If in pain, would recommend resuming it as she was previously taking. See if that adequately controls pain.

## 2018-04-10 ENCOUNTER — OFFICE VISIT (OUTPATIENT)
Dept: FAMILY MEDICINE CLINIC | Age: 83
End: 2018-04-10

## 2018-04-10 VITALS
DIASTOLIC BLOOD PRESSURE: 72 MMHG | OXYGEN SATURATION: 95 % | BODY MASS INDEX: 27.19 KG/M2 | WEIGHT: 163.4 LBS | HEART RATE: 56 BPM | SYSTOLIC BLOOD PRESSURE: 150 MMHG

## 2018-04-10 DIAGNOSIS — G90.09 PERIPHERAL AUTONOMIC NEUROPATHY OF UNKNOWN CAUSE: Primary | ICD-10-CM

## 2018-04-10 DIAGNOSIS — H53.9 VISION CHANGES: ICD-10-CM

## 2018-04-10 DIAGNOSIS — Z79.899 POLYPHARMACY: ICD-10-CM

## 2018-04-10 LAB — GLUCOSE BLD-MCNC: 141 MG/DL

## 2018-04-10 PROCEDURE — 99213 OFFICE O/P EST LOW 20 MIN: CPT | Performed by: FAMILY MEDICINE

## 2018-04-10 PROCEDURE — 82962 GLUCOSE BLOOD TEST: CPT | Performed by: FAMILY MEDICINE

## 2018-05-07 RX ORDER — DULOXETIN HYDROCHLORIDE 60 MG/1
CAPSULE, DELAYED RELEASE ORAL
Qty: 90 CAPSULE | Refills: 1 | Status: SHIPPED | OUTPATIENT
Start: 2018-05-07 | End: 2018-12-14 | Stop reason: SDUPTHER

## 2018-05-17 RX ORDER — TIMOLOL MALEATE 5 MG/ML
SOLUTION/ DROPS OPHTHALMIC
Qty: 1 BOTTLE | Refills: 1 | Status: SHIPPED | OUTPATIENT
Start: 2018-05-17 | End: 2018-07-12 | Stop reason: SDUPTHER

## 2018-05-21 DIAGNOSIS — G47.00 INSOMNIA, UNSPECIFIED TYPE: ICD-10-CM

## 2018-05-21 RX ORDER — TEMAZEPAM 15 MG/1
CAPSULE ORAL
Qty: 30 CAPSULE | Refills: 0 | Status: SHIPPED | OUTPATIENT
Start: 2018-05-21 | End: 2018-06-20 | Stop reason: SDUPTHER

## 2018-06-20 DIAGNOSIS — G47.00 INSOMNIA, UNSPECIFIED TYPE: ICD-10-CM

## 2018-06-20 RX ORDER — TEMAZEPAM 15 MG/1
CAPSULE ORAL
Qty: 30 CAPSULE | Refills: 0 | Status: SHIPPED | OUTPATIENT
Start: 2018-06-20 | End: 2018-07-24 | Stop reason: SDUPTHER

## 2018-06-25 RX ORDER — DABIGATRAN ETEXILATE 150 MG/1
150 CAPSULE, COATED PELLETS ORAL 2 TIMES DAILY
Qty: 180 CAPSULE | Refills: 3 | Status: SHIPPED | OUTPATIENT
Start: 2018-06-25 | End: 2019-01-02 | Stop reason: SDUPTHER

## 2018-07-12 RX ORDER — TIMOLOL MALEATE 5 MG/ML
SOLUTION/ DROPS OPHTHALMIC
Qty: 1 BOTTLE | Refills: 1 | Status: CANCELLED | OUTPATIENT
Start: 2018-07-12

## 2018-07-12 RX ORDER — TIMOLOL MALEATE 5 MG/ML
SOLUTION/ DROPS OPHTHALMIC
Qty: 1 BOTTLE | Refills: 1 | Status: SHIPPED | OUTPATIENT
Start: 2018-07-12 | End: 2018-09-06 | Stop reason: SDUPTHER

## 2018-07-24 ENCOUNTER — OFFICE VISIT (OUTPATIENT)
Dept: FAMILY MEDICINE CLINIC | Age: 83
End: 2018-07-24

## 2018-07-24 VITALS
HEART RATE: 64 BPM | WEIGHT: 170.8 LBS | SYSTOLIC BLOOD PRESSURE: 150 MMHG | OXYGEN SATURATION: 94 % | BODY MASS INDEX: 28.42 KG/M2 | DIASTOLIC BLOOD PRESSURE: 90 MMHG

## 2018-07-24 DIAGNOSIS — G62.9 NEUROPATHY: ICD-10-CM

## 2018-07-24 DIAGNOSIS — R26.89 BALANCE PROBLEMS: ICD-10-CM

## 2018-07-24 DIAGNOSIS — Z23 NEED FOR VACCINATION: ICD-10-CM

## 2018-07-24 DIAGNOSIS — G47.00 INSOMNIA, UNSPECIFIED TYPE: Primary | ICD-10-CM

## 2018-07-24 PROCEDURE — 99214 OFFICE O/P EST MOD 30 MIN: CPT | Performed by: FAMILY MEDICINE

## 2018-07-24 RX ORDER — TEMAZEPAM 15 MG/1
15 CAPSULE ORAL NIGHTLY PRN
Qty: 30 CAPSULE | Refills: 2 | Status: SHIPPED | OUTPATIENT
Start: 2018-07-24 | End: 2018-08-23

## 2018-07-26 NOTE — PROGRESS NOTES
Subjective:      Patient ID: Kevyn Borges is a 80 y.o. female. HPI  Patient presents for routine follow-up insomnia. She takes temazepam nightly for this. This seems to help her sleep better than anything else that she has tried in the past.    He shouldn't seems to be doing better with Cymbalta. She does not report any hallucinations as well as a concern in the past.    Itching continues to stay off of gabapentin. Her balance is gotten a lot better. Patient is inquiring about amitriptyline and whether or not this would be a good medication for her neuropathy in her feet. In general, her symptoms are mild and not terribly bothersome. Using continues to live independently with outside support from her children in the area. They don't feel comfortable with her driving and she really doesn't feel comfortable either due to vision impairment and trouble with the depth perception. Children are happy to help her run errands. She does miss the loss of independence. Walking with a walker without recent falls. Review of Systems    Patient Active Problem List   Diagnosis    Herniated nucleus pulposus with myelopathy, cervical    Spinal stenosis of lumbar region    Eustachian tube disorder    Carpal tunnel syndrome    Glaucoma    Essential hypertension    Osteoarthritis    Glaucoma    Hyperlipidemia    Dizziness    Peripheral autonomic neuropathy of unknown cause    Atrial fibrillation (HCC)    [place] (hard of hearing)       Outpatient Prescriptions Marked as Taking for the 7/24/18 encounter (Office Visit) with Graham Linares MD   Medication Sig Dispense Refill    zoster recombinant adjuvanted vaccine [facility]) 50 MCG SUSR injection . 5mL IM X 1 followed by . 5mL IM 2-6 months after dose #1 0.5 mL 0    temazepam (RESTORIL) 15 MG capsule Take 1 capsule by mouth nightly as needed for Sleep for up to 30 days. . 30 capsule 2    timolol (TIMOPTIC) 0.5 % ophthalmic solution APPLY ONE DROP TO regular rhythm and normal heart sounds. No murmur heard. Pulmonary/Chest: Effort normal and breath sounds normal. She has no wheezes. She has no rales. Neurological: She is alert and oriented to person, place, and time. Psychiatric: She has a normal mood and affect. Her behavior is normal.       Assessment:      Diagnosis Orders   1. Insomnia, unspecified type  temazepam (RESTORIL) 15 MG capsule   2. Need for vaccination  zoster recombinant adjuvanted vaccine (SHINGRIX) 50 MCG SUSR injection   3. Balance problems     4. Neuropathy (HCC)       Plan:     Continue temazepam at current dose. This seems to give patient some relief from her insomnia. Discussed amitriptyline with patient. Although this might offer her some relief from her neuropathy, it was not recommended due to potential side effects of increased drowsiness and increased risk of fall. Shingrix vaccine prescription written. Agree with no driving. Patient has to call office if she desires any help finding community resources. Patient feels that she is very supported from her children presently and does not need these at this time. Return to office 3 months for controlled substance review. Controlled Substances Monitoring:     RX Monitoring 7/24/2018   Attestation The Prescription Monitoring Report for this patient was reviewed today. Documentation Possible medication side effects, risk of tolerance/dependence & alternative treatments discussed. ;No signs of potential drug abuse or diversion identified.

## 2018-08-22 RX ORDER — TIMOLOL MALEATE 5 MG/ML
SOLUTION/ DROPS OPHTHALMIC
Qty: 1 BOTTLE | Refills: 0 | Status: SHIPPED | OUTPATIENT
Start: 2018-08-22 | End: 2018-11-09 | Stop reason: SDUPTHER

## 2018-09-06 ENCOUNTER — OFFICE VISIT (OUTPATIENT)
Dept: FAMILY MEDICINE CLINIC | Age: 83
End: 2018-09-06

## 2018-09-06 VITALS — WEIGHT: 165 LBS | BODY MASS INDEX: 27.49 KG/M2 | HEART RATE: 58 BPM | OXYGEN SATURATION: 98 % | HEIGHT: 65 IN

## 2018-09-06 DIAGNOSIS — G44.89 OTHER HEADACHE SYNDROME: ICD-10-CM

## 2018-09-06 DIAGNOSIS — I48.19 PERSISTENT ATRIAL FIBRILLATION (HCC): ICD-10-CM

## 2018-09-06 DIAGNOSIS — R42 DIZZINESS: Primary | ICD-10-CM

## 2018-09-06 PROCEDURE — 99214 OFFICE O/P EST MOD 30 MIN: CPT | Performed by: FAMILY MEDICINE

## 2018-09-06 RX ORDER — MECLIZINE HCL 12.5 MG/1
12.5 TABLET ORAL 3 TIMES DAILY PRN
COMMUNITY
End: 2018-10-25

## 2018-09-06 ASSESSMENT — ENCOUNTER SYMPTOMS
BLURRED VISION: 1
NAUSEA: 0

## 2018-09-12 ENCOUNTER — HOSPITAL ENCOUNTER (OUTPATIENT)
Dept: MRI IMAGING | Age: 83
Discharge: HOME | End: 2018-09-12
Attending: FAMILY MEDICINE | Admitting: FAMILY MEDICINE

## 2018-09-12 DIAGNOSIS — R42 DIZZINESS: ICD-10-CM

## 2018-09-12 DIAGNOSIS — G44.89 OTHER HEADACHE SYNDROME: ICD-10-CM

## 2018-10-22 DIAGNOSIS — G47.00 INSOMNIA, UNSPECIFIED TYPE: ICD-10-CM

## 2018-10-22 RX ORDER — LATANOPROST 50 UG/ML
1 SOLUTION/ DROPS OPHTHALMIC NIGHTLY
Qty: 1 BOTTLE | Refills: 1 | OUTPATIENT
Start: 2018-10-22

## 2018-10-22 RX ORDER — TEMAZEPAM 15 MG/1
CAPSULE ORAL
Qty: 7 CAPSULE | Refills: 0 | Status: SHIPPED | OUTPATIENT
Start: 2018-10-22 | End: 2018-10-25 | Stop reason: SDUPTHER

## 2018-10-22 NOTE — TELEPHONE ENCOUNTER
From: Nicholas Hilliard  Sent: 10/17/2018 3:48 PM EDT  Subject: Medication Renewal Request    Nicholas Hilliard would like a refill of the following medications:     latanoprost (XALATAN) 0.005 % ophthalmic solution Marlene Perez MD]    Preferred pharmacy: [facility] [address] - [place] [phone] - F [phone]

## 2018-10-25 ENCOUNTER — OFFICE VISIT (OUTPATIENT)
Dept: FAMILY MEDICINE CLINIC | Age: 83
End: 2018-10-25
Payer: COMMERCIAL

## 2018-10-25 ENCOUNTER — TELEPHONE (OUTPATIENT)
Dept: FAMILY MEDICINE CLINIC | Age: 83
End: 2018-10-25

## 2018-10-25 VITALS
HEART RATE: 60 BPM | BODY MASS INDEX: 28.02 KG/M2 | DIASTOLIC BLOOD PRESSURE: 70 MMHG | WEIGHT: 168.4 LBS | OXYGEN SATURATION: 95 % | SYSTOLIC BLOOD PRESSURE: 138 MMHG

## 2018-10-25 DIAGNOSIS — G47.00 INSOMNIA, UNSPECIFIED TYPE: Primary | ICD-10-CM

## 2018-10-25 DIAGNOSIS — R00.1 BRADYCARDIA: ICD-10-CM

## 2018-10-25 DIAGNOSIS — I48.19 PERSISTENT ATRIAL FIBRILLATION (HCC): ICD-10-CM

## 2018-10-25 DIAGNOSIS — Z23 NEED FOR VACCINATION: ICD-10-CM

## 2018-10-25 DIAGNOSIS — R42 DIZZINESS: ICD-10-CM

## 2018-10-25 PROCEDURE — G0008 ADMIN INFLUENZA VIRUS VAC: HCPCS | Performed by: FAMILY MEDICINE

## 2018-10-25 PROCEDURE — 99214 OFFICE O/P EST MOD 30 MIN: CPT | Performed by: FAMILY MEDICINE

## 2018-10-25 PROCEDURE — 90662 IIV NO PRSV INCREASED AG IM: CPT | Performed by: FAMILY MEDICINE

## 2018-10-25 RX ORDER — TEMAZEPAM 7.5 MG/1
CAPSULE ORAL
Qty: 90 CAPSULE | Refills: 0 | Status: SHIPPED | OUTPATIENT
Start: 2018-10-25 | End: 2018-11-25

## 2018-10-25 ASSESSMENT — PATIENT HEALTH QUESTIONNAIRE - PHQ9
SUM OF ALL RESPONSES TO PHQ QUESTIONS 1-9: 0
1. LITTLE INTEREST OR PLEASURE IN DOING THINGS: 0
SUM OF ALL RESPONSES TO PHQ QUESTIONS 1-9: 0
2. FEELING DOWN, DEPRESSED OR HOPELESS: 0
SUM OF ALL RESPONSES TO PHQ9 QUESTIONS 1 & 2: 0

## 2018-10-25 NOTE — PATIENT INSTRUCTIONS
season. But even when the vaccine doesn't exactly match these viruses, it may still provide some protection. Flu vaccine cannot prevent:  · Flu that is caused by a virus not covered by the vaccine. · Illnesses that look like flu but are not. Some people should not get this vaccine  Tell the person who is giving you the vaccine:  · If you have any severe (life-threatening) allergies. If you ever had a life-threatening allergic reaction after a dose of flu vaccine, or have a severe allergy to any part of this vaccine, you may be advised not to get vaccinated. Most, but not all, types of flu vaccine contain a small amount of egg protein. · If you ever had Guillain-Barré syndrome (also called GBS) Some people with a history of GBS should not get this vaccine. This should be discussed with your doctor. · If you are not feeling well. It is usually okay to get flu vaccine when you have a mild illness, but you might be asked to come back when you feel better. Risks of a vaccine reaction  With any medicine, including vaccines, there is a chance of reactions. These are usually mild and go away on their own, but serious reactions are also possible. Most people who get a flu shot do not have any problems with it. Minor problems following a flu shot include:  · Soreness, redness, or swelling where the shot was given  · Hoarseness  · Sore, red or itchy eyes  · Cough  · Fever  · Aches  · Headache  · Itching  · Fatigue  If these problems occur, they usually begin soon after the shot and last 1 or 2 days. More serious problems following a flu shot can include the following:  · There may be a small increased risk of Guillain-Barré Syndrome (GBS) after inactivated flu vaccine. This risk has been estimated at 1 or 2 additional cases per million people vaccinated. This is much lower than the risk of severe complications from flu, which can be prevented by flu vaccine.   · Isai Calvin children who get the flu shot along with

## 2018-10-25 NOTE — PROGRESS NOTES
Subjective:      Patient ID: Wei Espinal is a 80 y.o. female. HPI  Patient here for follow up dizziness. She was last seen in the office for this 9/6/2018. MRI was ordered which was negative for acute changes. Again recounts fall early in 2018 with headache beginning a couple of months later. CT neck and head at time of fall negative. She was referred to ENT and saw Dr. Diane Choi 8/15/2018. She was treated with meclizine and referred to PT. Meclizine did not help much but PT has been very good. Likes her therapist and is hopeful that this will continue to help. Amitriptyline stopped in 7/2018 as it was thought that this potentially contributing to symptoms. Frustrated that she is not feeling steady enough to drive. Would like influenza vaccine today. Has chronic insomnia. Requesting refill on temazepam.  Sometimes forgets to take this and realizes after laying in bed for hours that she missed dose. Does not think that she would be able to sleep without it. Hx of atrial fibrillation. Takes metoprolol pradaxa. Notes that pulse has been in 45s recently.   Review of Systems    Patient Active Problem List   Diagnosis    Herniated nucleus pulposus with myelopathy, cervical    Spinal stenosis of lumbar region    Eustachian tube disorder    Carpal tunnel syndrome    Glaucoma    Essential hypertension    Osteoarthritis    Glaucoma    Hyperlipidemia    Dizziness    Peripheral autonomic neuropathy of unknown cause    Atrial fibrillation (HCC)    [place] (hard of hearing)       Outpatient Prescriptions Marked as Taking for the 10/25/18 encounter (Office Visit) with Lucy Rowe MD   Medication Sig Dispense Refill    temazepam (RESTORIL) 15 MG capsule TAKE ONE CAPSULE BY MOUTH EVERY NIGHT AT BEDTIME. 7 capsule 0    metoprolol tartrate (LOPRESSOR) 25 MG tablet TAKE 1 TABLET TWICE A  tablet 1    timolol (TIMOPTIC) 0.5 % ophthalmic solution APPLY ONE DROP TO THE LEFT

## 2018-10-25 NOTE — TELEPHONE ENCOUNTER
temazepam (RESTORIL) 7.5 MG capsule 90 capsule 0 10/25/2018 11/25/2018    Sig: TAKE ONE CAPSULE BY MOUTH EVERY NIGHT AT BEDTIME.       Rebecca Oquendo from [facility] asking if they should wait to fill above script for pt until current script is finished    States pt is currently on day 3 of a 15mg script of above    Please call and advise

## 2018-10-31 NOTE — TELEPHONE ENCOUNTER
Patient daughter is calling stating this medication 7.5mg cost $283.00 after insurance. Is there another alternate that the patient can use?     Julia Sinha [phone] daughter

## 2018-11-05 NOTE — TELEPHONE ENCOUNTER
Patient ended up purchasing 30 day supply of the Restoril 7.5 mg (she's taken for the last 2 nights) Patient is wanting to know how long you think she will need to take this medication?

## 2018-11-12 RX ORDER — PRAVASTATIN SODIUM 10 MG
TABLET ORAL
Qty: 90 TABLET | Refills: 0 | Status: SHIPPED | OUTPATIENT
Start: 2018-11-12 | End: 2019-02-07 | Stop reason: SDUPTHER

## 2018-11-12 RX ORDER — TIMOLOL MALEATE 5 MG/ML
SOLUTION/ DROPS OPHTHALMIC
Qty: 1 BOTTLE | Refills: 0 | Status: SHIPPED | OUTPATIENT
Start: 2018-11-12 | End: 2021-06-15 | Stop reason: ALTCHOICE

## 2018-11-14 ENCOUNTER — PATIENT MESSAGE (OUTPATIENT)
Dept: FAMILY MEDICINE CLINIC | Age: 83
End: 2018-11-14

## 2018-11-14 DIAGNOSIS — G47.00 INSOMNIA, UNSPECIFIED TYPE: ICD-10-CM

## 2018-11-15 NOTE — TELEPHONE ENCOUNTER
Try to change to trazodone 50mg qhs. Can write for #30 with 3 RF. Do not take both the temazepam and trazodone together.

## 2018-11-19 ENCOUNTER — TELEPHONE (OUTPATIENT)
Dept: FAMILY MEDICINE CLINIC | Age: 83
End: 2018-11-19

## 2018-11-19 RX ORDER — TRAZODONE HYDROCHLORIDE 50 MG/1
50 TABLET ORAL NIGHTLY
Qty: 30 TABLET | Refills: 3 | Status: SHIPPED | OUTPATIENT
Start: 2018-11-19 | End: 2018-11-30 | Stop reason: SINTOL

## 2018-11-26 RX ORDER — DULOXETIN HYDROCHLORIDE 60 MG/1
CAPSULE, DELAYED RELEASE ORAL
Qty: 90 CAPSULE | Refills: 1 | OUTPATIENT
Start: 2018-11-26

## 2018-11-27 ENCOUNTER — TELEPHONE (OUTPATIENT)
Dept: FAMILY MEDICINE CLINIC | Age: 83
End: 2018-11-27

## 2018-11-29 ENCOUNTER — OFFICE VISIT (OUTPATIENT)
Dept: FAMILY MEDICINE CLINIC | Age: 83
End: 2018-11-29
Payer: COMMERCIAL

## 2018-11-29 VITALS
BODY MASS INDEX: 27.56 KG/M2 | HEART RATE: 69 BPM | OXYGEN SATURATION: 95 % | WEIGHT: 165.6 LBS | DIASTOLIC BLOOD PRESSURE: 66 MMHG | SYSTOLIC BLOOD PRESSURE: 125 MMHG

## 2018-11-29 DIAGNOSIS — G47.00 INSOMNIA, UNSPECIFIED TYPE: Primary | ICD-10-CM

## 2018-11-29 PROCEDURE — 99213 OFFICE O/P EST LOW 20 MIN: CPT | Performed by: FAMILY MEDICINE

## 2018-11-29 NOTE — PROGRESS NOTES
Patient is here to discuss trazodone. Patient states that trazodone made her dizzy and patient d/c medication. Patient wants to discuss alternatives.

## 2018-12-14 RX ORDER — DULOXETIN HYDROCHLORIDE 60 MG/1
CAPSULE, DELAYED RELEASE ORAL
Qty: 30 CAPSULE | Refills: 11 | Status: SHIPPED | OUTPATIENT
Start: 2018-12-14 | End: 2019-01-17 | Stop reason: SDUPTHER

## 2018-12-21 RX ORDER — TRIAMTERENE AND HYDROCHLOROTHIAZIDE 75; 50 MG/1; MG/1
TABLET ORAL
Qty: 90 TABLET | Refills: 0 | Status: SHIPPED | OUTPATIENT
Start: 2018-12-21 | End: 2019-02-07 | Stop reason: SDUPTHER

## 2019-01-02 RX ORDER — AMLODIPINE BESYLATE 5 MG/1
TABLET ORAL
Qty: 30 TABLET | Refills: 0 | Status: SHIPPED | OUTPATIENT
Start: 2019-01-02 | End: 2019-01-17 | Stop reason: SDUPTHER

## 2019-01-02 RX ORDER — DABIGATRAN ETEXILATE 150 MG/1
150 CAPSULE, COATED PELLETS ORAL 2 TIMES DAILY
Qty: 60 CAPSULE | Refills: 0 | Status: SHIPPED | OUTPATIENT
Start: 2019-01-02 | End: 2019-01-17 | Stop reason: SDUPTHER

## 2019-01-17 ENCOUNTER — OFFICE VISIT (OUTPATIENT)
Dept: FAMILY MEDICINE CLINIC | Age: 84
End: 2019-01-17
Payer: MEDICARE

## 2019-01-17 VITALS
WEIGHT: 170 LBS | BODY MASS INDEX: 28.29 KG/M2 | DIASTOLIC BLOOD PRESSURE: 60 MMHG | SYSTOLIC BLOOD PRESSURE: 140 MMHG | OXYGEN SATURATION: 96 % | HEART RATE: 136 BPM

## 2019-01-17 DIAGNOSIS — I10 ESSENTIAL HYPERTENSION: ICD-10-CM

## 2019-01-17 DIAGNOSIS — I48.19 PERSISTENT ATRIAL FIBRILLATION (HCC): Primary | ICD-10-CM

## 2019-01-17 DIAGNOSIS — G62.9 NEUROPATHY: ICD-10-CM

## 2019-01-17 DIAGNOSIS — R26.89 BALANCE PROBLEM: ICD-10-CM

## 2019-01-17 DIAGNOSIS — Z23 NEED FOR VACCINATION: ICD-10-CM

## 2019-01-17 PROCEDURE — 99214 OFFICE O/P EST MOD 30 MIN: CPT | Performed by: FAMILY MEDICINE

## 2019-01-17 RX ORDER — DULOXETIN HYDROCHLORIDE 60 MG/1
CAPSULE, DELAYED RELEASE ORAL
Qty: 90 CAPSULE | Refills: 3 | Status: SHIPPED | OUTPATIENT
Start: 2019-01-17 | End: 2019-06-17 | Stop reason: ALTCHOICE

## 2019-01-17 RX ORDER — DABIGATRAN ETEXILATE 150 MG/1
150 CAPSULE, COATED PELLETS ORAL 2 TIMES DAILY
Qty: 180 CAPSULE | Refills: 3 | Status: SHIPPED | OUTPATIENT
Start: 2019-01-17 | End: 2019-04-29 | Stop reason: SDUPTHER

## 2019-01-17 RX ORDER — AMLODIPINE BESYLATE 5 MG/1
TABLET ORAL
Qty: 90 TABLET | Refills: 3 | Status: SHIPPED | OUTPATIENT
Start: 2019-01-17 | End: 2019-06-17 | Stop reason: SDUPTHER

## 2019-02-07 RX ORDER — TRIAMTERENE AND HYDROCHLOROTHIAZIDE 75; 50 MG/1; MG/1
TABLET ORAL
Qty: 90 TABLET | Refills: 1 | Status: SHIPPED | OUTPATIENT
Start: 2019-02-07 | End: 2019-04-29 | Stop reason: SDUPTHER

## 2019-02-07 RX ORDER — PRAVASTATIN SODIUM 10 MG
10 TABLET ORAL DAILY
Qty: 90 TABLET | Refills: 1 | Status: SHIPPED | OUTPATIENT
Start: 2019-02-07 | End: 2019-04-29 | Stop reason: SDUPTHER

## 2019-02-18 RX ORDER — POTASSIUM CHLORIDE 750 MG/1
10 CAPSULE, EXTENDED RELEASE ORAL 2 TIMES DAILY
Qty: 180 CAPSULE | Refills: 1 | Status: SHIPPED | OUTPATIENT
Start: 2019-02-18 | End: 2019-04-29 | Stop reason: SDUPTHER

## 2019-02-21 RX ORDER — POTASSIUM CHLORIDE 750 MG/1
CAPSULE, EXTENDED RELEASE ORAL
Qty: 180 CAPSULE | Refills: 1 | OUTPATIENT
Start: 2019-02-21

## 2019-03-22 RX ORDER — DULOXETIN HYDROCHLORIDE 30 MG/1
CAPSULE, DELAYED RELEASE ORAL
Qty: 8 CAPSULE | Refills: 0 | Status: SHIPPED | OUTPATIENT
Start: 2019-03-22 | End: 2019-06-17 | Stop reason: ALTCHOICE

## 2019-04-18 RX ORDER — AMLODIPINE BESYLATE 5 MG/1
TABLET ORAL
Qty: 30 TABLET | Refills: 2 | Status: SHIPPED | OUTPATIENT
Start: 2019-04-18 | End: 2019-06-17 | Stop reason: SDUPTHER

## 2019-04-29 DIAGNOSIS — I48.19 PERSISTENT ATRIAL FIBRILLATION (HCC): ICD-10-CM

## 2019-04-30 RX ORDER — PRAVASTATIN SODIUM 10 MG
10 TABLET ORAL DAILY
Qty: 90 TABLET | Refills: 0 | Status: SHIPPED | OUTPATIENT
Start: 2019-04-30 | End: 2019-06-19 | Stop reason: SDUPTHER

## 2019-04-30 RX ORDER — POTASSIUM CHLORIDE 750 MG/1
10 CAPSULE, EXTENDED RELEASE ORAL 2 TIMES DAILY
Qty: 180 CAPSULE | Refills: 0 | Status: SHIPPED | OUTPATIENT
Start: 2019-04-30 | End: 2019-08-29 | Stop reason: SDUPTHER

## 2019-04-30 RX ORDER — DABIGATRAN ETEXILATE 150 MG/1
150 CAPSULE, COATED PELLETS ORAL 2 TIMES DAILY
Qty: 180 CAPSULE | Refills: 0 | Status: SHIPPED | OUTPATIENT
Start: 2019-04-30 | End: 2019-08-29 | Stop reason: SDUPTHER

## 2019-04-30 RX ORDER — TRIAMTERENE AND HYDROCHLOROTHIAZIDE 75; 50 MG/1; MG/1
TABLET ORAL
Qty: 90 TABLET | Refills: 1 | Status: SHIPPED | OUTPATIENT
Start: 2019-04-30 | End: 2019-08-29 | Stop reason: SDUPTHER

## 2019-06-17 ENCOUNTER — OFFICE VISIT (OUTPATIENT)
Dept: FAMILY MEDICINE CLINIC | Age: 84
End: 2019-06-17
Payer: MEDICARE

## 2019-06-17 VITALS
SYSTOLIC BLOOD PRESSURE: 144 MMHG | OXYGEN SATURATION: 98 % | WEIGHT: 168.2 LBS | HEART RATE: 54 BPM | BODY MASS INDEX: 27.99 KG/M2 | DIASTOLIC BLOOD PRESSURE: 78 MMHG

## 2019-06-17 DIAGNOSIS — I10 ESSENTIAL HYPERTENSION: ICD-10-CM

## 2019-06-17 DIAGNOSIS — G62.9 NEUROPATHY: Primary | ICD-10-CM

## 2019-06-17 PROCEDURE — 99213 OFFICE O/P EST LOW 20 MIN: CPT | Performed by: FAMILY MEDICINE

## 2019-06-17 RX ORDER — AMLODIPINE BESYLATE 5 MG/1
TABLET ORAL
Qty: 90 TABLET | Refills: 3 | Status: SHIPPED | OUTPATIENT
Start: 2019-06-17 | End: 2019-08-29 | Stop reason: SDUPTHER

## 2019-06-17 RX ORDER — DULOXETIN HYDROCHLORIDE 30 MG/1
30 CAPSULE, DELAYED RELEASE ORAL DAILY
Qty: 90 CAPSULE | Refills: 3 | Status: SHIPPED | OUTPATIENT
Start: 2019-06-17 | End: 2019-08-29 | Stop reason: SDUPTHER

## 2019-06-17 ASSESSMENT — PATIENT HEALTH QUESTIONNAIRE - PHQ9
2. FEELING DOWN, DEPRESSED OR HOPELESS: 0
SUM OF ALL RESPONSES TO PHQ QUESTIONS 1-9: 0
1. LITTLE INTEREST OR PLEASURE IN DOING THINGS: 0
SUM OF ALL RESPONSES TO PHQ9 QUESTIONS 1 & 2: 0
SUM OF ALL RESPONSES TO PHQ QUESTIONS 1-9: 0

## 2019-06-17 NOTE — PROGRESS NOTES
10 MEQ extended release capsule Take 1 capsule by mouth 2 times daily 180 capsule 0    timolol (TIMOPTIC) 0.5 % ophthalmic solution INSTILL ONE DROP IN THE LEFT EYE DAILY 1 Bottle 0    LUTEIN PO Take by mouth      latanoprost (XALATAN) 0.005 % ophthalmic solution Place 1 drop into the left eye nightly 1 Bottle 1    dorzolamide (TRUSOPT) 2 % ophthalmic solution Place 1 drop into the left eye 2 times daily      acetaminophen (TYLENOL) 500 MG tablet Take 1,000 mg by mouth three times daily      Glucosamine-Chondroit-Vit C-Mn (GLUCOSAMINE CHONDR 500 COMPLEX PO) Take  by mouth.  Coenzyme Q10 (COQ-10 PO) Take  by mouth.  MULTIPLE VITAMIN PO Take  by mouth. Allergies   Allergen Reactions    Ace Inhibitors     Catapres [Clonidine Hcl]      patch    Diovan [Valsartan]      ineffective    Eszopiclone      ineffective    Morphine      All derivatives- GI upset       Social History     Tobacco Use    Smoking status: Never Smoker    Smokeless tobacco: Never Used   Substance Use Topics    Alcohol use: Yes     Alcohol/week: 0.0 oz     Comment: wine with meals occ       Objective:   BP (!) 144/78 (Site: Left Upper Arm, Position: Sitting, Cuff Size: Medium Adult)   Pulse 54   Wt 168 lb 3.2 oz (76.3 kg)   SpO2 98%   BMI 27.99 kg/m²     Physical Exam   Constitutional: She is oriented to person, place, and time. She appears well-developed and well-nourished. No distress. Cardiovascular: Normal rate, regular rhythm and normal heart sounds. No murmur heard. Pulmonary/Chest: Effort normal and breath sounds normal. No respiratory distress. She has no wheezes. She has no rales. Neurological: She is alert and oriented to person, place, and time. Psychiatric: She has a normal mood and affect. Her behavior is normal.       Assessment:      Diagnosis Orders   1. Neuropathy  DULoxetine (CYMBALTA) 30 MG extended release capsule   2.  Essential hypertension  amLODIPine (NORVASC) 5 MG tablet       Plan: Okay to resume Cymbalta. Would begin at 30 mg daily. We will see how her symptoms are over the next 2 to 4 weeks. We can increase dose if needed, but hopefully, 30 mg daily will be sufficient. Refill sent for amlodipine.

## 2019-06-19 RX ORDER — PRAVASTATIN SODIUM 10 MG
TABLET ORAL
Qty: 90 TABLET | Refills: 0 | Status: SHIPPED | OUTPATIENT
Start: 2019-06-19 | End: 2019-08-29 | Stop reason: SDUPTHER

## 2019-06-20 DIAGNOSIS — E78.5 HYPERLIPIDEMIA, UNSPECIFIED HYPERLIPIDEMIA TYPE: Primary | ICD-10-CM

## 2019-06-20 DIAGNOSIS — R00.1 BRADYCARDIA: ICD-10-CM

## 2019-06-21 ENCOUNTER — HOSPITAL ENCOUNTER (OUTPATIENT)
Age: 84
Discharge: HOME | End: 2019-06-21
Payer: MEDICARE

## 2019-06-21 LAB
A/G RATIO: 1.1 (ref 1.1–2.2)
ALBUMIN SERPL-MCNC: 4 G/DL (ref 3.4–5)
ALP BLD-CCNC: 60 U/L (ref 40–129)
ALT SERPL-CCNC: 10 U/L (ref 10–40)
ANION GAP SERPL CALCULATED.3IONS-SCNC: 13 MMOL/L (ref 3–16)
AST SERPL-CCNC: 19 U/L (ref 15–37)
BILIRUB SERPL-MCNC: 0.6 MG/DL (ref 0–1)
BUN BLDV-MCNC: 24 MG/DL (ref 7–20)
CALCIUM SERPL-MCNC: 9.4 MG/DL (ref 8.3–10.6)
CHLORIDE BLD-SCNC: 107 MMOL/L (ref 99–110)
CHOLESTEROL, TOTAL: 150 MG/DL (ref 0–199)
CO2: 22 MMOL/L (ref 21–32)
CREAT SERPL-MCNC: 0.7 MG/DL (ref 0.6–1.2)
GFR AFRICAN AMERICAN: >60
GFR NON-AFRICAN AMERICAN: >60
GLOBULIN: 3.7 G/DL
GLUCOSE BLD-MCNC: 106 MG/DL (ref 70–99)
HCT VFR BLD CALC: 39.6 % (ref 36–48)
HDLC SERPL-MCNC: 39 MG/DL (ref 40–60)
HEMOGLOBIN: 13.5 G/DL (ref 12–16)
LDL CHOLESTEROL CALCULATED: 71 MG/DL
MCH RBC QN AUTO: 32.8 PG (ref 26–34)
MCHC RBC AUTO-ENTMCNC: 34.1 G/DL (ref 31–36)
MCV RBC AUTO: 96 FL (ref 80–100)
PDW BLD-RTO: 13.5 % (ref 12.4–15.4)
PLATELET # BLD: 208 K/UL (ref 135–450)
PMV BLD AUTO: 10.3 FL (ref 5–10.5)
POTASSIUM SERPL-SCNC: 3.7 MMOL/L (ref 3.5–5.1)
RBC # BLD: 4.12 M/UL (ref 4–5.2)
SODIUM BLD-SCNC: 142 MMOL/L (ref 136–145)
T4 FREE: 1.1 NG/DL (ref 0.9–1.8)
TOTAL PROTEIN: 7.7 G/DL (ref 6.4–8.2)
TRIGL SERPL-MCNC: 199 MG/DL (ref 0–150)
TSH SERPL DL<=0.05 MIU/L-ACNC: 2.02 UIU/ML (ref 0.27–4.2)
VLDLC SERPL CALC-MCNC: 40 MG/DL
WBC # BLD: 8.2 K/UL (ref 4–11)

## 2019-06-21 PROCEDURE — 80061 LIPID PANEL: CPT

## 2019-06-21 PROCEDURE — 80053 COMPREHEN METABOLIC PANEL: CPT

## 2019-06-21 PROCEDURE — 84439 ASSAY OF FREE THYROXINE: CPT

## 2019-06-21 PROCEDURE — 84443 ASSAY THYROID STIM HORMONE: CPT

## 2019-06-21 PROCEDURE — 36415 COLL VENOUS BLD VENIPUNCTURE: CPT

## 2019-06-21 PROCEDURE — 85027 COMPLETE CBC AUTOMATED: CPT

## 2019-06-28 DIAGNOSIS — R00.1 BRADYCARDIA: Primary | ICD-10-CM

## 2019-07-03 ENCOUNTER — TELEPHONE (OUTPATIENT)
Dept: CARDIOLOGY CLINIC | Age: 84
End: 2019-07-03

## 2019-07-03 DIAGNOSIS — R00.1 BRADYCARDIA: Primary | ICD-10-CM

## 2019-07-09 ENCOUNTER — NURSE ONLY (OUTPATIENT)
Dept: CARDIOLOGY CLINIC | Age: 84
End: 2019-07-09
Payer: MEDICARE

## 2019-07-09 DIAGNOSIS — R00.1 BRADYCARDIA: Primary | ICD-10-CM

## 2019-08-08 PROCEDURE — 93228 REMOTE 30 DAY ECG REV/REPORT: CPT | Performed by: INTERNAL MEDICINE

## 2019-08-22 ENCOUNTER — OFFICE VISIT (OUTPATIENT)
Dept: CARDIOLOGY CLINIC | Age: 84
End: 2019-08-22
Payer: MEDICARE

## 2019-08-22 VITALS
WEIGHT: 169 LBS | HEART RATE: 73 BPM | BODY MASS INDEX: 28.16 KG/M2 | DIASTOLIC BLOOD PRESSURE: 63 MMHG | HEIGHT: 65 IN | SYSTOLIC BLOOD PRESSURE: 157 MMHG | RESPIRATION RATE: 18 BRPM

## 2019-08-22 DIAGNOSIS — I47.29 NSVT (NONSUSTAINED VENTRICULAR TACHYCARDIA): ICD-10-CM

## 2019-08-22 DIAGNOSIS — I10 BENIGN ESSENTIAL HTN: ICD-10-CM

## 2019-08-22 DIAGNOSIS — R00.1 BRADYCARDIA: Primary | ICD-10-CM

## 2019-08-22 DIAGNOSIS — I48.19 PERSISTENT ATRIAL FIBRILLATION (HCC): ICD-10-CM

## 2019-08-22 PROCEDURE — 93000 ELECTROCARDIOGRAM COMPLETE: CPT | Performed by: INTERNAL MEDICINE

## 2019-08-22 PROCEDURE — 99204 OFFICE O/P NEW MOD 45 MIN: CPT | Performed by: INTERNAL MEDICINE

## 2019-08-22 NOTE — PROGRESS NOTES
[address]   Electrophysiology Consultation   Date: 8/22/2019  Reason for Consultation: Bradycardia  Consult Requesting Physician: Bridger Westfall DO      Chief Complaint   Patient presents with    New Patient     [facility] 7/9/19 in [facility]    Atrial Fibrillation    Bradycardia        HPI: Gloria Burkett is a 80 y.o. female with a PMH of Persistent Atrial fibrillation, HTN, HLD, and frequent falls. Jonn Chan presents to the office today d/t concerns for some bradycardia. She states that she does not have any symptoms with the bradycardic episodes. She wore a MCOT and had episodes where her HR was as low as 21 BPM when she was sleeping. There were no true  pauses. She has had slow HR for years and during the day HR is in 50'sWe discussed what a pacemaker is and risk and benefits. Past Medical History:   Diagnosis Date    Arthritis     OA    Atrial fibrillation ([address]     Blood transfusion     1975    Carpal tunnel syndrome     mild    Dizziness     Edema     Frequent falls     Glaucoma     [place] IRA (hard of hearing)     bilateral hearing aids    Hx of cholelithiasis     Hyperlipidemia     Hypertension     Osteoarthrosis, unspecified whether generalized or localized, unspecified site     Postmenopausal atrophic vaginitis     Unspecified disorder of autonomic nervous system     Unspecified essential hypertension     Unspecified eustachian tube disorder     Unspecified glaucoma(365.9)     Unspecified hemorrhoids without mention of complication     Urethral stricture unspecified         Past Surgical History:   Procedure Laterality Date    COLONOSCOPY      ELBOW SURGERY      left I&D    EYE SURGERY      cataracts    HEMORRHOID SURGERY      HYSTERECTOMY      1 ovary left ;later oophorectomy     KNEE CARTILAGE SURGERY      LAMINECTOMY  1/19/12    L  3-4 bilateral laminectomy discectomy       Allergies:   Allergies   Allergen Reactions    Ace Inhibitors     Catapres [Clonidine Hcl]

## 2019-08-29 ENCOUNTER — OFFICE VISIT (OUTPATIENT)
Dept: INTERNAL MEDICINE CLINIC | Age: 84
End: 2019-08-29
Payer: MEDICARE

## 2019-08-29 VITALS
HEART RATE: 52 BPM | OXYGEN SATURATION: 98 % | DIASTOLIC BLOOD PRESSURE: 72 MMHG | WEIGHT: 165 LBS | SYSTOLIC BLOOD PRESSURE: 122 MMHG | BODY MASS INDEX: 27.46 KG/M2

## 2019-08-29 DIAGNOSIS — Z91.81 AT HIGH RISK FOR FALLS: ICD-10-CM

## 2019-08-29 DIAGNOSIS — E78.5 HYPERLIPIDEMIA, UNSPECIFIED HYPERLIPIDEMIA TYPE: Primary | ICD-10-CM

## 2019-08-29 DIAGNOSIS — Z87.820 HISTORY OF TRAUMATIC BRAIN INJURY: ICD-10-CM

## 2019-08-29 DIAGNOSIS — I10 ESSENTIAL HYPERTENSION: ICD-10-CM

## 2019-08-29 DIAGNOSIS — E55.9 VITAMIN D INSUFFICIENCY: ICD-10-CM

## 2019-08-29 DIAGNOSIS — G60.9 IDIOPATHIC PERIPHERAL NEUROPATHY: ICD-10-CM

## 2019-08-29 DIAGNOSIS — H53.9 VISION CHANGES: ICD-10-CM

## 2019-08-29 DIAGNOSIS — I48.19 PERSISTENT ATRIAL FIBRILLATION (HCC): ICD-10-CM

## 2019-08-29 DIAGNOSIS — M48.061 SPINAL STENOSIS OF LUMBAR REGION WITHOUT NEUROGENIC CLAUDICATION: ICD-10-CM

## 2019-08-29 PROCEDURE — 99203 OFFICE O/P NEW LOW 30 MIN: CPT | Performed by: INTERNAL MEDICINE

## 2019-08-29 RX ORDER — DABIGATRAN ETEXILATE 150 MG/1
150 CAPSULE, COATED PELLETS ORAL 2 TIMES DAILY
Qty: 180 CAPSULE | Refills: 0 | Status: SHIPPED | OUTPATIENT
Start: 2019-08-29 | End: 2020-01-07

## 2019-08-29 RX ORDER — DULOXETIN HYDROCHLORIDE 30 MG/1
30 CAPSULE, DELAYED RELEASE ORAL DAILY
Qty: 90 CAPSULE | Refills: 3 | Status: SHIPPED | OUTPATIENT
Start: 2019-08-29 | End: 2019-12-16 | Stop reason: SDUPTHER

## 2019-08-29 RX ORDER — TRIAMTERENE AND HYDROCHLOROTHIAZIDE 75; 50 MG/1; MG/1
TABLET ORAL
Qty: 90 TABLET | Refills: 3 | Status: SHIPPED | OUTPATIENT
Start: 2019-08-29 | End: 2020-02-28 | Stop reason: ALTCHOICE

## 2019-08-29 RX ORDER — AMLODIPINE BESYLATE 5 MG/1
TABLET ORAL
Qty: 90 TABLET | Refills: 3 | Status: SHIPPED | OUTPATIENT
Start: 2019-08-29 | End: 2020-01-06 | Stop reason: SDUPTHER

## 2019-08-29 RX ORDER — POTASSIUM CHLORIDE 750 MG/1
10 CAPSULE, EXTENDED RELEASE ORAL 2 TIMES DAILY
Qty: 180 CAPSULE | Refills: 0 | Status: SHIPPED | OUTPATIENT
Start: 2019-08-29 | End: 2020-01-06 | Stop reason: SDUPTHER

## 2019-08-29 RX ORDER — PRAVASTATIN SODIUM 10 MG
TABLET ORAL
Qty: 90 TABLET | Refills: 3 | Status: SHIPPED | OUTPATIENT
Start: 2019-08-29 | End: 2020-01-03 | Stop reason: SDUPTHER

## 2019-08-29 ASSESSMENT — ENCOUNTER SYMPTOMS
CHEST TIGHTNESS: 0
CONSTIPATION: 0
DIARRHEA: 0
SHORTNESS OF BREATH: 0

## 2019-08-29 NOTE — PROGRESS NOTES
capsule Take 1 capsule by mouth 2 times daily 180 capsule 0    zoster recombinant adjuvanted vaccine [facility]) 50 MCG/0.5ML SUSR injection . 5mL IM X 1 followed by . 5mL IM 2-6 months after dose #1 (Patient not taking: Reported on 8/29/2019) 0.5 mL 0     No facility-administered medications prior to visit. Patient'spast medical history, surgical history, family history, medications,  and allergies  were all reviewed and updated as appropriate today. Review of Systems   Constitutional: Negative for appetite change, fatigue and fever. Respiratory: Negative for chest tightness and shortness of breath. Cardiovascular: Negative for chest pain. Gastrointestinal: Negative for constipation and diarrhea. Genitourinary: Negative for dysuria and frequency. Skin: Negative for rash. /72   Pulse 52   Wt 165 lb (74.8 kg)   SpO2 98%   BMI 27.46 kg/m²   Physical Exam   Constitutional: She appears well-developed and well-nourished. She is cooperative. Non-toxic appearance. HENT:   Head: Normocephalic. Right Ear: Tympanic membrane, external ear and ear canal normal.   Left Ear: Tympanic membrane, external ear and ear canal normal.   Nose: Nose normal.   Mouth/Throat: Oropharynx is clear and moist and mucous membranes are normal.   Neck: Carotid bruit is not present. No thyroid mass and no thyromegaly present. Cardiovascular: Normal heart sounds and intact distal pulses. An irregularly irregular rhythm present. Bradycardia present. No murmur heard. Pulses:       Dorsalis pedis pulses are 2+ on the right side, and 2+ on the left side. No LE edema   Pulmonary/Chest: Effort normal and breath sounds normal.   Lymphadenopathy:     She has no cervical adenopathy. Neurological: She is alert. ASSESSMENT/PLAN:    Problem List Items Addressed This Visit     Atrial fibrillation ([address]     Asymptomatic. On Pradaxa for anticoagulation. No medications.  Did see Dr. Serene Noland recently d/t

## 2019-09-01 NOTE — ASSESSMENT & PLAN NOTE
Asymptomatic. On Pradaxa for anticoagulation. No medications. Did see Dr. Lesa Bradford recently d/t bradycardia. Pacemaker was discussed but she declines.

## 2019-12-16 RX ORDER — DULOXETINE 40 MG/1
40 CAPSULE, DELAYED RELEASE ORAL DAILY
Qty: 90 CAPSULE | Refills: 3 | Status: SHIPPED | OUTPATIENT
Start: 2019-12-16 | End: 2020-05-04 | Stop reason: SDUPTHER

## 2019-12-19 DIAGNOSIS — E78.5 HYPERLIPIDEMIA, UNSPECIFIED HYPERLIPIDEMIA TYPE: ICD-10-CM

## 2019-12-19 LAB
CHOLESTEROL, TOTAL: 162 MG/DL (ref 0–199)
HDLC SERPL-MCNC: 49 MG/DL (ref 40–60)
LDL CHOLESTEROL CALCULATED: 82 MG/DL
TRIGL SERPL-MCNC: 154 MG/DL (ref 0–150)
VLDLC SERPL CALC-MCNC: 31 MG/DL

## 2019-12-28 DIAGNOSIS — E78.5 HYPERLIPIDEMIA, UNSPECIFIED HYPERLIPIDEMIA TYPE: ICD-10-CM

## 2019-12-28 RX ORDER — PRAVASTATIN SODIUM 10 MG
TABLET ORAL
Qty: 90 TABLET | Refills: 0 | OUTPATIENT
Start: 2019-12-28

## 2019-12-29 DIAGNOSIS — E78.5 HYPERLIPIDEMIA, UNSPECIFIED HYPERLIPIDEMIA TYPE: ICD-10-CM

## 2019-12-30 RX ORDER — PRAVASTATIN SODIUM 10 MG
TABLET ORAL
Qty: 90 TABLET | Refills: 0 | OUTPATIENT
Start: 2019-12-30

## 2020-01-03 ENCOUNTER — TELEPHONE (OUTPATIENT)
Dept: INTERNAL MEDICINE CLINIC | Age: 85
End: 2020-01-03

## 2020-01-03 RX ORDER — PRAVASTATIN SODIUM 10 MG
TABLET ORAL
Qty: 90 TABLET | Refills: 3 | Status: SHIPPED | OUTPATIENT
Start: 2020-01-03 | End: 2020-05-04 | Stop reason: SDUPTHER

## 2020-01-07 RX ORDER — WARFARIN SODIUM 5 MG/1
5 TABLET ORAL DAILY
Qty: 30 TABLET | Refills: 3 | Status: SHIPPED | OUTPATIENT
Start: 2020-01-07 | End: 2020-04-20

## 2020-01-17 ENCOUNTER — HOSPITAL ENCOUNTER (OUTPATIENT)
Age: 85
Discharge: HOME | End: 2020-01-17
Payer: MEDICARE

## 2020-01-17 LAB
INR BLD: 1.28 (ref 0.86–1.14)
PROTHROMBIN TIME: 14.9 SEC (ref 10–13.2)

## 2020-01-17 PROCEDURE — 85610 PROTHROMBIN TIME: CPT

## 2020-01-17 PROCEDURE — 36415 COLL VENOUS BLD VENIPUNCTURE: CPT

## 2020-01-23 DIAGNOSIS — Z79.01 ANTICOAGULATED ON COUMADIN: ICD-10-CM

## 2020-01-23 LAB
INR BLD: 1.85 (ref 0.86–1.14)
PROTHROMBIN TIME: 21.6 SEC (ref 10–13.2)

## 2020-01-30 ENCOUNTER — HOSPITAL ENCOUNTER (OUTPATIENT)
Age: 85
Discharge: HOME | End: 2020-01-30
Payer: MEDICARE

## 2020-01-30 LAB
INR BLD: 2.6 (ref 0.86–1.14)
PROTHROMBIN TIME: 30.4 SEC (ref 10–13.2)

## 2020-01-30 PROCEDURE — 36415 COLL VENOUS BLD VENIPUNCTURE: CPT

## 2020-01-30 PROCEDURE — 85610 PROTHROMBIN TIME: CPT

## 2020-02-04 ENCOUNTER — TELEPHONE (OUTPATIENT)
Dept: INTERNAL MEDICINE CLINIC | Age: 85
End: 2020-02-04

## 2020-02-04 RX ORDER — AMLODIPINE BESYLATE 5 MG/1
TABLET ORAL
Qty: 90 TABLET | Refills: 3 | Status: SHIPPED | OUTPATIENT
Start: 2020-02-04 | End: 2020-02-18 | Stop reason: SDUPTHER

## 2020-02-04 NOTE — TELEPHONE ENCOUNTER
t following up from my chart mess that was sent. She needs her meds sent to [facility] for amlodipine today she is out now.

## 2020-02-06 DIAGNOSIS — Z79.01 ANTICOAGULATED ON COUMADIN: ICD-10-CM

## 2020-02-06 LAB
A/G RATIO: 1.2 (ref 1.1–2.2)
ALBUMIN SERPL-MCNC: 4.2 G/DL (ref 3.4–5)
ALP BLD-CCNC: 92 U/L (ref 40–129)
ALT SERPL-CCNC: 18 U/L (ref 10–40)
ANION GAP SERPL CALCULATED.3IONS-SCNC: 15 MMOL/L (ref 3–16)
AST SERPL-CCNC: 25 U/L (ref 15–37)
BASOPHILS ABSOLUTE: 0.1 K/UL (ref 0–0.2)
BASOPHILS RELATIVE PERCENT: 1.1 %
BILIRUB SERPL-MCNC: 0.5 MG/DL (ref 0–1)
BUN BLDV-MCNC: 29 MG/DL (ref 7–20)
CALCIUM SERPL-MCNC: 10.3 MG/DL (ref 8.3–10.6)
CHLORIDE BLD-SCNC: 99 MMOL/L (ref 99–110)
CO2: 26 MMOL/L (ref 21–32)
CREAT SERPL-MCNC: 0.7 MG/DL (ref 0.6–1.2)
EOSINOPHILS ABSOLUTE: 0.4 K/UL (ref 0–0.6)
EOSINOPHILS RELATIVE PERCENT: 4.9 %
FOLATE: >20 NG/ML (ref 4.78–24.2)
GFR AFRICAN AMERICAN: >60
GFR NON-AFRICAN AMERICAN: >60
GLOBULIN: 3.5 G/DL
GLUCOSE BLD-MCNC: 83 MG/DL (ref 70–99)
HCT VFR BLD CALC: 43.5 % (ref 36–48)
HEMOGLOBIN: 14.5 G/DL (ref 12–16)
INR BLD: 2.31 (ref 0.86–1.14)
LYMPHOCYTES ABSOLUTE: 2.8 K/UL (ref 1–5.1)
LYMPHOCYTES RELATIVE PERCENT: 32.2 %
MCH RBC QN AUTO: 32 PG (ref 26–34)
MCHC RBC AUTO-ENTMCNC: 33.3 G/DL (ref 31–36)
MCV RBC AUTO: 96 FL (ref 80–100)
MONOCYTES ABSOLUTE: 0.8 K/UL (ref 0–1.3)
MONOCYTES RELATIVE PERCENT: 9.2 %
NEUTROPHILS ABSOLUTE: 4.6 K/UL (ref 1.7–7.7)
NEUTROPHILS RELATIVE PERCENT: 52.6 %
PDW BLD-RTO: 13.7 % (ref 12.4–15.4)
PLATELET # BLD: 233 K/UL (ref 135–450)
PMV BLD AUTO: 10.2 FL (ref 5–10.5)
POTASSIUM SERPL-SCNC: 4.1 MMOL/L (ref 3.5–5.1)
PROTHROMBIN TIME: 27 SEC (ref 10–13.2)
RBC # BLD: 4.54 M/UL (ref 4–5.2)
SODIUM BLD-SCNC: 140 MMOL/L (ref 136–145)
TOTAL PROTEIN: 7.7 G/DL (ref 6.4–8.2)
TSH REFLEX: 1.77 UIU/ML (ref 0.27–4.2)
VITAMIN B-12: 1195 PG/ML (ref 211–911)
VITAMIN D 25-HYDROXY: 64.2 NG/ML
WBC # BLD: 8.8 K/UL (ref 4–11)

## 2020-02-13 DIAGNOSIS — Z79.01 ANTICOAGULATED ON COUMADIN: ICD-10-CM

## 2020-02-13 LAB
INR BLD: 1.85 (ref 0.86–1.14)
PROTHROMBIN TIME: 21.6 SEC (ref 10–13.2)

## 2020-02-18 RX ORDER — AMLODIPINE BESYLATE 5 MG/1
TABLET ORAL
Qty: 90 TABLET | Refills: 3 | Status: SHIPPED | OUTPATIENT
Start: 2020-02-18 | End: 2020-05-04 | Stop reason: SDUPTHER

## 2020-02-18 NOTE — TELEPHONE ENCOUNTER
Patient requesting a medication refill.   Medication: amLODIPine (NORVASC) 5 MG tablet   Pharmacy:[place] [address] [phone] [facility] [phone]   Last office visit: 8/29/19  Next office visit: 2/28/2020

## 2020-02-28 ENCOUNTER — OFFICE VISIT (OUTPATIENT)
Dept: INTERNAL MEDICINE CLINIC | Age: 85
End: 2020-02-28
Payer: MEDICARE

## 2020-02-28 VITALS — WEIGHT: 169.3 LBS | BODY MASS INDEX: 28.17 KG/M2 | OXYGEN SATURATION: 98 % | HEART RATE: 80 BPM

## 2020-02-28 LAB
INTERNATIONAL NORMALIZATION RATIO, POC: 1.8
PROTHROMBIN TIME, POC: NORMAL

## 2020-02-28 PROCEDURE — 99214 OFFICE O/P EST MOD 30 MIN: CPT | Performed by: INTERNAL MEDICINE

## 2020-02-28 PROCEDURE — 85610 PROTHROMBIN TIME: CPT | Performed by: INTERNAL MEDICINE

## 2020-02-28 RX ORDER — NETARSUDIL 0.2 MG/ML
1 SOLUTION/ DROPS OPHTHALMIC; TOPICAL DAILY
COMMUNITY
Start: 2020-02-03 | End: 2021-06-15 | Stop reason: ALTCHOICE

## 2020-02-28 RX ORDER — OLMESARTAN MEDOXOMIL 20 MG/1
20 TABLET ORAL DAILY
Qty: 30 TABLET | Refills: 2 | Status: SHIPPED | OUTPATIENT
Start: 2020-02-28 | End: 2020-05-04 | Stop reason: SDUPTHER

## 2020-02-28 RX ORDER — BRIMONIDINE TARTRATE 2 MG/ML
1 SOLUTION/ DROPS OPHTHALMIC 3 TIMES DAILY
COMMUNITY
Start: 2020-01-29

## 2020-02-28 SDOH — ECONOMIC STABILITY: FOOD INSECURITY: WITHIN THE PAST 12 MONTHS, THE FOOD YOU BOUGHT JUST DIDN'T LAST AND YOU DIDN'T HAVE MONEY TO GET MORE.: NEVER TRUE

## 2020-02-28 SDOH — ECONOMIC STABILITY: FOOD INSECURITY: WITHIN THE PAST 12 MONTHS, YOU WORRIED THAT YOUR FOOD WOULD RUN OUT BEFORE YOU GOT MONEY TO BUY MORE.: NEVER TRUE

## 2020-02-28 SDOH — ECONOMIC STABILITY: TRANSPORTATION INSECURITY
IN THE PAST 12 MONTHS, HAS THE LACK OF TRANSPORTATION KEPT YOU FROM MEDICAL APPOINTMENTS OR FROM GETTING MEDICATIONS?: NO

## 2020-02-28 SDOH — ECONOMIC STABILITY: TRANSPORTATION INSECURITY
IN THE PAST 12 MONTHS, HAS LACK OF TRANSPORTATION KEPT YOU FROM MEETINGS, WORK, OR FROM GETTING THINGS NEEDED FOR DAILY LIVING?: NO

## 2020-02-28 SDOH — ECONOMIC STABILITY: INCOME INSECURITY: HOW HARD IS IT FOR YOU TO PAY FOR THE VERY BASICS LIKE FOOD, HOUSING, MEDICAL CARE, AND HEATING?: NOT HARD AT ALL

## 2020-02-28 ASSESSMENT — ENCOUNTER SYMPTOMS
CHEST TIGHTNESS: 0
DIARRHEA: 0
SHORTNESS OF BREATH: 0
CONSTIPATION: 0

## 2020-02-28 ASSESSMENT — PATIENT HEALTH QUESTIONNAIRE - PHQ9
2. FEELING DOWN, DEPRESSED OR HOPELESS: 0
1. LITTLE INTEREST OR PLEASURE IN DOING THINGS: 0
SUM OF ALL RESPONSES TO PHQ9 QUESTIONS 1 & 2: 0
SUM OF ALL RESPONSES TO PHQ QUESTIONS 1-9: 0
SUM OF ALL RESPONSES TO PHQ QUESTIONS 1-9: 0

## 2020-02-28 NOTE — PROGRESS NOTES
.5mL IM X 1 followed by . 5mL IM 2-6 months after dose #1 0.5 mL 0    timolol (TIMOPTIC) 0.5 % ophthalmic solution INSTILL ONE DROP IN THE LEFT EYE DAILY 1 Bottle 0    LUTEIN PO Take by mouth      latanoprost (XALATAN) 0.005 % ophthalmic solution Place 1 drop into the left eye nightly 1 Bottle 1    dorzolamide (TRUSOPT) 2 % ophthalmic solution Place 1 drop into the left eye 2 times daily      acetaminophen (TYLENOL) 500 MG tablet Take 1,000 mg by mouth three times daily      Glucosamine-Chondroit-Vit C-Mn (GLUCOSAMINE CHONDR 500 COMPLEX PO) Take  by mouth.  Coenzyme Q10 (COQ-10 PO) Take  by mouth.  MULTIPLE VITAMIN PO Take  by mouth.  brimonidine (ALPHAGAN) 0.2 % ophthalmic solution Place 1 drop into the left eye 2 times daily      RHOPRESSA 0.02 % SOLN Place 1 drop into both eyes daily      triamterene-hydrochlorothiazide (MAXZIDE) 75-50 MG per tablet TAKE 1 TABLET DAILY 90 tablet 3     No facility-administered medications prior to visit. Patient'spast medical history, surgical history, family history, medications,  and allergies  were all reviewed and updated as appropriate today. Review of Systems   Constitutional: Negative for appetite change, fatigue and fever. Respiratory: Negative for chest tightness and shortness of breath. Cardiovascular: Negative for chest pain. Gastrointestinal: Negative for constipation and diarrhea. Skin: Negative for rash. Pulse 80   Wt 169 lb 4.8 oz (76.8 kg)   SpO2 98%   BMI 28.17 kg/m²   Physical Exam  Vitals signs and nursing note reviewed. Constitutional:       Appearance: She is well-developed. She is not toxic-appearing. HENT:      Head: Normocephalic.       Right Ear: Tympanic membrane, ear canal and external ear normal.      Left Ear: Tympanic membrane, ear canal and external ear normal.      Nose: Nose normal.      Mouth/Throat:      Mouth: Mucous membranes are moist.      Pharynx: No oropharyngeal exudate or posterior oropharyngeal erythema. Neck:      Thyroid: No thyroid mass or thyromegaly. Vascular: No carotid bruit. Cardiovascular:      Rate and Rhythm: Bradycardia present. Rhythm irregular. Heart sounds: Normal heart sounds. No murmur. Comments: No LE edema  Pulmonary:      Effort: Pulmonary effort is normal.      Breath sounds: Normal breath sounds. Lymphadenopathy:      Cervical: No cervical adenopathy. Neurological:      Mental Status: She is alert and oriented to person, place, and time. Mental status is at baseline. Comments: Ambulates with walker   Psychiatric:         Behavior: Behavior is cooperative. ASSESSMENT/PLAN:    Problem List Items Addressed This Visit     Atrial fibrillation ([address]     Now on Coumadin for anticoagulation d/t cost of NOAC. INR 1.8 today, increase Coumadin to 5 mg qd and re-check in 2 weeks. No beta-blocker d/t bradycardia. Does follow with Dr. Diego Dahl. Relevant Orders    POCT INR (Completed)    Essential hypertension     Stop Maxide as she doesn't always take it. No swelling with amlodipine. Continue amlodipine 5 mg qd and add Benicar 20 mg qd. Re-check in 2 weeks. Hyperlipidemia     Check lipid panel today. Continue pravastatin 10 mg qd. Relevant Medications    olmesartan (BENICAR) 20 MG tablet    Idiopathic peripheral neuropathy - Primary     Continue Cymbalta 40  mg qd. Did not tolerate higher doses of Cymbalta d/t hallucinations and vivid dreams. Did not tolerate gabapentin. Recommend Aspercreme with Lidocaine prn.                 Current Outpatient Medications   Medication Sig Dispense Refill    olmesartan (BENICAR) 20 MG tablet Take 1 tablet by mouth daily 30 tablet 2    amLODIPine (NORVASC) 5 MG tablet TAKE 1 TABLET DAILY 90 tablet 3    warfarin (COUMADIN) 5 MG tablet Take 1 tablet by mouth daily 30 tablet 3    potassium chloride (KLOR-CON SPRINKLE) 10 MEQ extended release capsule Take 1 capsule by mouth 2 times daily 180 capsule 3    pravastatin (PRAVACHOL) 10 MG tablet TAKE ONE TABLET BY MOUTH DAILY 90 tablet 3    DULoxetine 40 MG CPEP Take 40 mg by mouth daily 90 capsule 3    zoster recombinant adjuvanted vaccine (SHINGRIX) 50 MCG/0.5ML SUSR injection . 5mL IM X 1 followed by . 5mL IM 2-6 months after dose #1 0.5 mL 0    timolol (TIMOPTIC) 0.5 % ophthalmic solution INSTILL ONE DROP IN THE LEFT EYE DAILY 1 Bottle 0    LUTEIN PO Take by mouth      latanoprost (XALATAN) 0.005 % ophthalmic solution Place 1 drop into the left eye nightly 1 Bottle 1    dorzolamide (TRUSOPT) 2 % ophthalmic solution Place 1 drop into the left eye 2 times daily      acetaminophen (TYLENOL) 500 MG tablet Take 1,000 mg by mouth three times daily      Glucosamine-Chondroit-Vit C-Mn (GLUCOSAMINE CHONDR 500 COMPLEX PO) Take  by mouth.  Coenzyme Q10 (COQ-10 PO) Take  by mouth.  MULTIPLE VITAMIN PO Take  by mouth.  brimonidine (ALPHAGAN) 0.2 % ophthalmic solution Place 1 drop into the left eye 2 times daily      RHOPRESSA 0.02 % SOLN Place 1 drop into both eyes daily       No current facility-administered medications for this visit. Return in about 2 weeks (around 3/13/2020) for bp.

## 2020-02-28 NOTE — LETTER
[facility]  [phone] [id number]  Phone: [phone]  Fax: [phone]    Raquel Fenton DO        February 28, 2020     Patient: Deepthi No   YOB: 1927   Date of Visit: 2/28/2020       To Whom It May Concern: It is my medical opinion that Merlin Rubinstein requires a disability parking placard for the following reasons:  Reasons: Spinal Stenosis and Herniated nucleus pulposus with myelopathy  Duration of need: 5 years     If you have any questions or concerns, please don't hesitate to call.     Sincerely,        Raquel Fenton DO

## 2020-03-02 NOTE — ASSESSMENT & PLAN NOTE
Stop Maxide as she doesn't always take it. No swelling with amlodipine. Continue amlodipine 5 mg qd and add Benicar 20 mg qd. Re-check in 2 weeks.

## 2020-03-02 NOTE — ASSESSMENT & PLAN NOTE
Continue Cymbalta 40  mg qd. Did not tolerate higher doses of Cymbalta d/t hallucinations and vivid dreams. Did not tolerate gabapentin. Recommend Aspercreme with Lidocaine prn.

## 2020-03-02 NOTE — ASSESSMENT & PLAN NOTE
Now on Coumadin for anticoagulation d/t cost of NOAC. INR 1.8 today, increase Coumadin to 5 mg qd and re-check in 2 weeks. No beta-blocker d/t bradycardia. Does follow with Dr. Brisa Berkowitz.

## 2020-03-03 ENCOUNTER — TELEPHONE (OUTPATIENT)
Dept: INTERNAL MEDICINE CLINIC | Age: 85
End: 2020-03-03

## 2020-03-13 ENCOUNTER — OFFICE VISIT (OUTPATIENT)
Dept: INTERNAL MEDICINE CLINIC | Age: 85
End: 2020-03-13
Payer: MEDICARE

## 2020-03-13 VITALS
HEART RATE: 84 BPM | BODY MASS INDEX: 29.02 KG/M2 | WEIGHT: 174.4 LBS | DIASTOLIC BLOOD PRESSURE: 58 MMHG | OXYGEN SATURATION: 95 % | SYSTOLIC BLOOD PRESSURE: 150 MMHG

## 2020-03-13 DIAGNOSIS — I10 ESSENTIAL HYPERTENSION: ICD-10-CM

## 2020-03-13 LAB
ANION GAP SERPL CALCULATED.3IONS-SCNC: 12 MMOL/L (ref 3–16)
BUN BLDV-MCNC: 21 MG/DL (ref 7–20)
CALCIUM SERPL-MCNC: 9.3 MG/DL (ref 8.3–10.6)
CHLORIDE BLD-SCNC: 105 MMOL/L (ref 99–110)
CO2: 24 MMOL/L (ref 21–32)
CREAT SERPL-MCNC: 0.6 MG/DL (ref 0.6–1.2)
GFR AFRICAN AMERICAN: >60
GFR NON-AFRICAN AMERICAN: >60
GLUCOSE BLD-MCNC: 101 MG/DL (ref 70–99)
INTERNATIONAL NORMALIZATION RATIO, POC: 2.5
POTASSIUM SERPL-SCNC: 4 MMOL/L (ref 3.5–5.1)
PROTHROMBIN TIME, POC: NORMAL
SODIUM BLD-SCNC: 141 MMOL/L (ref 136–145)

## 2020-03-13 PROCEDURE — 85610 PROTHROMBIN TIME: CPT | Performed by: INTERNAL MEDICINE

## 2020-03-13 PROCEDURE — 99213 OFFICE O/P EST LOW 20 MIN: CPT | Performed by: INTERNAL MEDICINE

## 2020-03-13 RX ORDER — TRIAMTERENE AND HYDROCHLOROTHIAZIDE 75; 50 MG/1; MG/1
TABLET ORAL
Qty: 90 TABLET | Refills: 3 | Status: SHIPPED | OUTPATIENT
Start: 2020-03-13 | End: 2020-05-04 | Stop reason: SDUPTHER

## 2020-03-13 ASSESSMENT — ENCOUNTER SYMPTOMS
CHEST TIGHTNESS: 0
CONSTIPATION: 0
DIARRHEA: 0
SHORTNESS OF BREATH: 0

## 2020-03-13 NOTE — PROGRESS NOTES
education: Not on file    Highest education level: Not on file   Occupational History    Not on file   Social Needs    Financial resource strain: Not hard at all    Food insecurity     Worry: Never true     Inability: Never true   [employer] needs     Medical: No     Non-medical: No   Tobacco Use    Smoking status: Never Smoker    Smokeless tobacco: Never Used   Substance and Sexual Activity    Alcohol use:  Yes     Alcohol/week: 0.0 standard drinks     Comment: wine with meals occ    Drug use: No    Sexual activity: Not on file   Lifestyle    Physical activity     Days per week: Not on file     Minutes per session: Not on file    Stress: Not on file   Relationships    Social connections     Talks on phone: Not on file     Gets together: Not on file     Attends Jainism service: Not on file     Active member of club or organization: Not on file     Attends meetings of clubs or organizations: Not on file     Relationship status: Not on file    Intimate partner violence     Fear of current or ex partner: Not on file     Emotionally abused: Not on file     Physically abused: Not on file     Forced sexual activity: Not on file   Other Topics Concern    Not on file   Social History Narrative    3  siblings    7 children    3 sons live close by     Family History   Problem Relation Age of Onset    Diabetes Mother     High Blood Pressure Mother     Heart Disease Mother     Diabetes Brother     High Blood Pressure Brother     Heart Disease Brother     Cancer Brother     Rheum Arthritis Daughter         fibromayalgia    Cancer Brother         prostate cancer     Heart Attack Brother         Outpatient Medications Prior to Visit   Medication Sig Dispense Refill    brimonidine (ALPHAGAN) 0.2 % ophthalmic solution Place 1 drop into the left eye 2 times daily      RHOPRESSA 0.02 % SOLN Place 1 drop into both eyes daily      olmesartan (BENICAR) 20 MG tablet Take 1 tablet by mouth daily 30 tablet 2    amLODIPine (NORVASC) 5 MG tablet TAKE 1 TABLET DAILY 90 tablet 3    warfarin (COUMADIN) 5 MG tablet Take 1 tablet by mouth daily 30 tablet 3    potassium chloride (KLOR-CON SPRINKLE) 10 MEQ extended release capsule Take 1 capsule by mouth 2 times daily 180 capsule 3    pravastatin (PRAVACHOL) 10 MG tablet TAKE ONE TABLET BY MOUTH DAILY 90 tablet 3    DULoxetine 40 MG CPEP Take 40 mg by mouth daily 90 capsule 3    zoster recombinant adjuvanted vaccine (SHINGRIX) 50 MCG/0.5ML SUSR injection . 5mL IM X 1 followed by . 5mL IM 2-6 months after dose #1 0.5 mL 0    timolol (TIMOPTIC) 0.5 % ophthalmic solution INSTILL ONE DROP IN THE LEFT EYE DAILY 1 Bottle 0    LUTEIN PO Take by mouth      latanoprost (XALATAN) 0.005 % ophthalmic solution Place 1 drop into the left eye nightly 1 Bottle 1    dorzolamide (TRUSOPT) 2 % ophthalmic solution Place 1 drop into the left eye 2 times daily      acetaminophen (TYLENOL) 500 MG tablet Take 1,000 mg by mouth three times daily      Glucosamine-Chondroit-Vit C-Mn (GLUCOSAMINE CHONDR 500 COMPLEX PO) Take  by mouth.  Coenzyme Q10 (COQ-10 PO) Take  by mouth.  MULTIPLE VITAMIN PO Take  by mouth. No facility-administered medications prior to visit. Patient'spast medical history, surgical history, family history, medications,  and allergies  were all reviewed and updated as appropriate today. Review of Systems   Constitutional: Negative for appetite change, fatigue and fever. Respiratory: Negative for chest tightness and shortness of breath. Cardiovascular: Positive for leg swelling. Negative for chest pain. Gastrointestinal: Negative for constipation and diarrhea. Skin: Negative for rash. BP (!) 150/58   Pulse 84   Wt 174 lb 6.4 oz (79.1 kg)   SpO2 95%   BMI 29.02 kg/m²   Physical Exam  Vitals signs and nursing note reviewed. Constitutional:       Appearance: She is well-developed. She is not toxic-appearing.    HENT: (PRAVACHOL) 10 MG tablet TAKE ONE TABLET BY MOUTH DAILY 90 tablet 3    DULoxetine 40 MG CPEP Take 40 mg by mouth daily 90 capsule 3    zoster recombinant adjuvanted vaccine (SHINGRIX) 50 MCG/0.5ML SUSR injection . 5mL IM X 1 followed by . 5mL IM 2-6 months after dose #1 0.5 mL 0    timolol (TIMOPTIC) 0.5 % ophthalmic solution INSTILL ONE DROP IN THE LEFT EYE DAILY 1 Bottle 0    LUTEIN PO Take by mouth      latanoprost (XALATAN) 0.005 % ophthalmic solution Place 1 drop into the left eye nightly 1 Bottle 1    dorzolamide (TRUSOPT) 2 % ophthalmic solution Place 1 drop into the left eye 2 times daily      acetaminophen (TYLENOL) 500 MG tablet Take 1,000 mg by mouth three times daily      Glucosamine-Chondroit-Vit C-Mn (GLUCOSAMINE CHONDR 500 COMPLEX PO) Take  by mouth.  Coenzyme Q10 (COQ-10 PO) Take  by mouth.  MULTIPLE VITAMIN PO Take  by mouth. No current facility-administered medications for this visit. Return in about 3 months (around 6/13/2020).

## 2020-04-07 ENCOUNTER — NURSE ONLY (OUTPATIENT)
Dept: INTERNAL MEDICINE CLINIC | Age: 85
End: 2020-04-07
Payer: MEDICARE

## 2020-04-07 LAB
INTERNATIONAL NORMALIZATION RATIO, POC: 1.7
PROTHROMBIN TIME, POC: NORMAL

## 2020-04-07 PROCEDURE — 85610 PROTHROMBIN TIME: CPT | Performed by: INTERNAL MEDICINE

## 2020-04-20 RX ORDER — WARFARIN SODIUM 5 MG/1
TABLET ORAL
Qty: 30 TABLET | Refills: 2 | Status: SHIPPED | OUTPATIENT
Start: 2020-04-20 | End: 2020-06-27 | Stop reason: SDUPTHER

## 2020-04-21 ENCOUNTER — NURSE ONLY (OUTPATIENT)
Dept: INTERNAL MEDICINE CLINIC | Age: 85
End: 2020-04-21
Payer: MEDICARE

## 2020-04-21 LAB
INTERNATIONAL NORMALIZATION RATIO, POC: 2.6
PROTHROMBIN TIME, POC: NORMAL

## 2020-04-21 PROCEDURE — 85610 PROTHROMBIN TIME: CPT | Performed by: INTERNAL MEDICINE

## 2020-05-04 ENCOUNTER — NURSE ONLY (OUTPATIENT)
Dept: INTERNAL MEDICINE CLINIC | Age: 85
End: 2020-05-04
Payer: MEDICARE

## 2020-05-04 LAB
INTERNATIONAL NORMALIZATION RATIO, POC: 1.9
PROTHROMBIN TIME, POC: NORMAL

## 2020-05-04 PROCEDURE — 85610 PROTHROMBIN TIME: CPT | Performed by: INTERNAL MEDICINE

## 2020-05-04 RX ORDER — OLMESARTAN MEDOXOMIL 20 MG/1
20 TABLET ORAL DAILY
Qty: 30 TABLET | Refills: 2 | Status: SHIPPED | OUTPATIENT
Start: 2020-05-04 | End: 2020-06-27 | Stop reason: SDUPTHER

## 2020-05-04 RX ORDER — POTASSIUM CHLORIDE 750 MG/1
10 CAPSULE, EXTENDED RELEASE ORAL 2 TIMES DAILY
Qty: 180 CAPSULE | Refills: 2 | Status: CANCELLED | OUTPATIENT
Start: 2020-05-04

## 2020-05-04 RX ORDER — TRIAMTERENE AND HYDROCHLOROTHIAZIDE 75; 50 MG/1; MG/1
TABLET ORAL
Qty: 90 TABLET | Refills: 3 | Status: SHIPPED | OUTPATIENT
Start: 2020-05-04 | End: 2021-04-30 | Stop reason: SDUPTHER

## 2020-05-04 RX ORDER — PRAVASTATIN SODIUM 10 MG
TABLET ORAL
Qty: 90 TABLET | Refills: 3 | Status: SHIPPED | OUTPATIENT
Start: 2020-05-04 | End: 2021-01-18

## 2020-05-04 RX ORDER — AMLODIPINE BESYLATE 5 MG/1
TABLET ORAL
Qty: 90 TABLET | Refills: 3 | Status: SHIPPED | OUTPATIENT
Start: 2020-05-04 | End: 2021-03-25

## 2020-05-04 RX ORDER — DULOXETINE 40 MG/1
40 CAPSULE, DELAYED RELEASE ORAL DAILY
Qty: 90 CAPSULE | Refills: 3 | Status: SHIPPED
Start: 2020-05-04 | End: 2021-01-22 | Stop reason: DRUGHIGH

## 2020-05-04 RX ORDER — POTASSIUM CHLORIDE 750 MG/1
10 CAPSULE, EXTENDED RELEASE ORAL 2 TIMES DAILY
Qty: 180 CAPSULE | Refills: 3 | Status: SHIPPED | OUTPATIENT
Start: 2020-05-04 | End: 2021-04-30 | Stop reason: SDUPTHER

## 2020-05-20 ENCOUNTER — NURSE ONLY (OUTPATIENT)
Dept: INTERNAL MEDICINE CLINIC | Age: 85
End: 2020-05-20
Payer: MEDICARE

## 2020-05-20 LAB
INTERNATIONAL NORMALIZATION RATIO, POC: 2.5
PROTHROMBIN TIME, POC: NORMAL

## 2020-05-20 PROCEDURE — 85610 PROTHROMBIN TIME: CPT | Performed by: INTERNAL MEDICINE

## 2020-06-19 LAB
BASOPHILS ABSOLUTE: 0.1 K/UL (ref 0–0.2)
BASOPHILS RELATIVE PERCENT: 0.7 %
EOSINOPHILS ABSOLUTE: 0.6 K/UL (ref 0–0.6)
EOSINOPHILS RELATIVE PERCENT: 6.6 %
HCT VFR BLD CALC: 40.6 % (ref 36–48)
HEMOGLOBIN: 14 G/DL (ref 12–16)
INR BLD: 2.8 (ref 0.86–1.14)
LYMPHOCYTES ABSOLUTE: 2.9 K/UL (ref 1–5.1)
LYMPHOCYTES RELATIVE PERCENT: 32.7 %
MCH RBC QN AUTO: 32.7 PG (ref 26–34)
MCHC RBC AUTO-ENTMCNC: 34.5 G/DL (ref 31–36)
MCV RBC AUTO: 94.7 FL (ref 80–100)
MONOCYTES ABSOLUTE: 0.7 K/UL (ref 0–1.3)
MONOCYTES RELATIVE PERCENT: 7.7 %
NEUTROPHILS ABSOLUTE: 4.7 K/UL (ref 1.7–7.7)
NEUTROPHILS RELATIVE PERCENT: 52.3 %
PDW BLD-RTO: 14 % (ref 12.4–15.4)
PLATELET # BLD: 216 K/UL (ref 135–450)
PMV BLD AUTO: 10.6 FL (ref 5–10.5)
PROTHROMBIN TIME: 32.8 SEC (ref 10–13.2)
RBC # BLD: 4.28 M/UL (ref 4–5.2)
WBC # BLD: 8.9 K/UL (ref 4–11)

## 2020-06-24 ENCOUNTER — TELEPHONE (OUTPATIENT)
Dept: INTERNAL MEDICINE CLINIC | Age: 85
End: 2020-06-24

## 2020-06-24 NOTE — TELEPHONE ENCOUNTER
[facility] received a call from:    Name of Caller: Jason Salinas    Relationship to patient: Self    Organization name: n/a    Best contact number: [phone]    Reason for call: Pt is requesting an in office visit with her PCP. Pt would like to have her yearly check up.

## 2020-06-26 ENCOUNTER — TELEPHONE (OUTPATIENT)
Dept: ADMINISTRATIVE | Age: 85
End: 2020-06-26

## 2020-06-29 RX ORDER — OLMESARTAN MEDOXOMIL 20 MG/1
20 TABLET ORAL DAILY
Qty: 90 TABLET | Refills: 1 | Status: SHIPPED | OUTPATIENT
Start: 2020-06-29 | End: 2021-02-04

## 2020-06-29 RX ORDER — WARFARIN SODIUM 5 MG/1
TABLET ORAL
Qty: 90 TABLET | Refills: 1 | Status: SHIPPED | OUTPATIENT
Start: 2020-06-29 | End: 2021-01-08

## 2020-07-29 ENCOUNTER — OFFICE VISIT (OUTPATIENT)
Dept: INTERNAL MEDICINE CLINIC | Age: 85
End: 2020-07-29
Payer: MEDICARE

## 2020-07-29 VITALS
HEART RATE: 92 BPM | BODY MASS INDEX: 28.19 KG/M2 | DIASTOLIC BLOOD PRESSURE: 60 MMHG | TEMPERATURE: 99.4 F | SYSTOLIC BLOOD PRESSURE: 124 MMHG | OXYGEN SATURATION: 95 % | WEIGHT: 169.4 LBS

## 2020-07-29 DIAGNOSIS — I48.11 LONGSTANDING PERSISTENT ATRIAL FIBRILLATION (HCC): ICD-10-CM

## 2020-07-29 DIAGNOSIS — I10 ESSENTIAL HYPERTENSION: ICD-10-CM

## 2020-07-29 DIAGNOSIS — Z79.01 ANTICOAGULATED ON COUMADIN: ICD-10-CM

## 2020-07-29 DIAGNOSIS — E78.5 HYPERLIPIDEMIA LDL GOAL <100: ICD-10-CM

## 2020-07-29 PROBLEM — H53.8 BLURRED VISION: Status: ACTIVE | Noted: 2019-08-29

## 2020-07-29 LAB
BASOPHILS ABSOLUTE: 0 K/UL (ref 0–0.2)
BASOPHILS RELATIVE PERCENT: 0.4 %
EOSINOPHILS ABSOLUTE: 0.4 K/UL (ref 0–0.6)
EOSINOPHILS RELATIVE PERCENT: 4.5 %
HCT VFR BLD CALC: 40.4 % (ref 36–48)
HEMOGLOBIN: 13.5 G/DL (ref 12–16)
INTERNATIONAL NORMALIZATION RATIO, POC: 3.2
LYMPHOCYTES ABSOLUTE: 2.4 K/UL (ref 1–5.1)
LYMPHOCYTES RELATIVE PERCENT: 30.1 %
MCH RBC QN AUTO: 32.4 PG (ref 26–34)
MCHC RBC AUTO-ENTMCNC: 33.4 G/DL (ref 31–36)
MCV RBC AUTO: 97 FL (ref 80–100)
MONOCYTES ABSOLUTE: 0.7 K/UL (ref 0–1.3)
MONOCYTES RELATIVE PERCENT: 8.3 %
NEUTROPHILS ABSOLUTE: 4.6 K/UL (ref 1.7–7.7)
NEUTROPHILS RELATIVE PERCENT: 56.7 %
PDW BLD-RTO: 13.9 % (ref 12.4–15.4)
PLATELET # BLD: 216 K/UL (ref 135–450)
PMV BLD AUTO: 10.2 FL (ref 5–10.5)
PROTHROMBIN TIME, POC: NORMAL
RBC # BLD: 4.17 M/UL (ref 4–5.2)
WBC # BLD: 8.1 K/UL (ref 4–11)

## 2020-07-29 PROCEDURE — 99214 OFFICE O/P EST MOD 30 MIN: CPT | Performed by: INTERNAL MEDICINE

## 2020-07-29 PROCEDURE — 85610 PROTHROMBIN TIME: CPT | Performed by: INTERNAL MEDICINE

## 2020-07-29 ASSESSMENT — ENCOUNTER SYMPTOMS
SHORTNESS OF BREATH: 0
CONSTIPATION: 0
DIARRHEA: 0
CHEST TIGHTNESS: 0

## 2020-07-29 NOTE — ASSESSMENT & PLAN NOTE
Patient recently saw her ophthalmologist and told her her blurred vision was related to dry eyes. She is trying different drops for this.

## 2020-07-29 NOTE — ASSESSMENT & PLAN NOTE
Asymptomatic. Decrease Coumadin dose to 5 mg daily (INR 3.2). No beta-blocker due to bradycardia. Repeat INR next week. As transportation is an issue for her, we will see if we can get her home INR monitor.

## 2020-07-29 NOTE — PROGRESS NOTES
HYSTERECTOMY      1 ovary left ;later oophorectomy     KNEE CARTILAGE SURGERY      LAMINECTOMY  12    L  3-4 bilateral laminectomy discectomy      Social History     Socioeconomic History    Marital status:      Spouse name: Not on file    Number of children: Not on file    Years of education: Not on file    Highest education level: Not on file   Occupational History    Not on file   Social Needs    Financial resource strain: Not hard at all    Food insecurity     Worry: Never true     Inability: Never true   [employer] needs     Medical: No     Non-medical: No   Tobacco Use    Smoking status: Never Smoker    Smokeless tobacco: Never Used   Substance and Sexual Activity    Alcohol use:  Yes     Alcohol/week: 0.0 standard drinks     Comment: wine with meals occ    Drug use: No    Sexual activity: Not on file   Lifestyle    Physical activity     Days per week: Not on file     Minutes per session: Not on file    Stress: Not on file   Relationships    Social connections     Talks on phone: Not on file     Gets together: Not on file     Attends Bahai service: Not on file     Active member of club or organization: Not on file     Attends meetings of clubs or organizations: Not on file     Relationship status: Not on file    Intimate partner violence     Fear of current or ex partner: Not on file     Emotionally abused: Not on file     Physically abused: Not on file     Forced sexual activity: Not on file   Other Topics Concern    Not on file   Social History Narrative    3  siblings    9 children    3 sons live close by     Family History   Problem Relation Age of Onset    Diabetes Mother     High Blood Pressure Mother     Heart Disease Mother     Diabetes Brother     High Blood Pressure Brother     Heart Disease Brother     Cancer Brother     Rheum Arthritis Daughter         fibromayalgia    Cancer Brother         prostate cancer     Heart Attack Brother Outpatient Medications Prior to Visit   Medication Sig Dispense Refill    warfarin (COUMADIN) 5 MG tablet TAKE ONE TABLET BY MOUTH DAILY 90 tablet 1    olmesartan (BENICAR) 20 MG tablet Take 1 tablet by mouth daily 90 tablet 1    triamterene-hydrochlorothiazide (MAXZIDE) 75-50 MG per tablet TAKE 1 TABLET DAILY 90 tablet 3    amLODIPine (NORVASC) 5 MG tablet TAKE 1 TABLET DAILY 90 tablet 3    potassium chloride (KLOR-CON SPRINKLE) 10 MEQ extended release capsule Take 1 capsule by mouth 2 times daily 180 capsule 3    pravastatin (PRAVACHOL) 10 MG tablet TAKE ONE TABLET BY MOUTH DAILY 90 tablet 3    DULoxetine HCl 40 MG CPEP Take 40 mg by mouth daily 90 capsule 3    brimonidine (ALPHAGAN) 0.2 % ophthalmic solution Place 1 drop into the left eye 2 times daily      RHOPRESSA 0.02 % SOLN Place 1 drop into both eyes daily      zoster recombinant adjuvanted vaccine (SHINGRIX) 50 MCG/0.5ML SUSR injection . 5mL IM X 1 followed by . 5mL IM 2-6 months after dose #1 0.5 mL 0    timolol (TIMOPTIC) 0.5 % ophthalmic solution INSTILL ONE DROP IN THE LEFT EYE DAILY 1 Bottle 0    LUTEIN PO Take by mouth      latanoprost (XALATAN) 0.005 % ophthalmic solution Place 1 drop into the left eye nightly 1 Bottle 1    dorzolamide (TRUSOPT) 2 % ophthalmic solution Place 1 drop into the left eye 2 times daily      acetaminophen (TYLENOL) 500 MG tablet Take 1,000 mg by mouth three times daily      Glucosamine-Chondroit-Vit C-Mn (GLUCOSAMINE CHONDR 500 COMPLEX PO) Take  by mouth.  Coenzyme Q10 (COQ-10 PO) Take  by mouth.  MULTIPLE VITAMIN PO Take  by mouth. No facility-administered medications prior to visit. Patient'spast medical history, surgical history, family history, medications,  and allergies  were all reviewed and updated as appropriate today. Review of Systems   Constitutional: Negative for appetite change, fatigue and fever.    Respiratory: Negative for chest tightness and shortness of Essential hypertension - Primary     Very well controlled. Continue amlodipine 5 mg daily, Benicar 20 mg daily, and Maxide 75/50 mg daily. Check BMP. Relevant Orders    CBC Auto Differential    Hyperlipidemia     Continue pravastatin 10 mg daily. Check lipid panel and CMP. Idiopathic peripheral neuropathy     Continue Cymbalta 40 mg qd. Other Visit Diagnoses     Anticoagulated on Coumadin        Relevant Orders    POCT INR (Completed)    CBC Auto Differential          Current Outpatient Medications   Medication Sig Dispense Refill    warfarin (COUMADIN) 5 MG tablet TAKE ONE TABLET BY MOUTH DAILY 90 tablet 1    olmesartan (BENICAR) 20 MG tablet Take 1 tablet by mouth daily 90 tablet 1    triamterene-hydrochlorothiazide (MAXZIDE) 75-50 MG per tablet TAKE 1 TABLET DAILY 90 tablet 3    amLODIPine (NORVASC) 5 MG tablet TAKE 1 TABLET DAILY 90 tablet 3    potassium chloride (KLOR-CON SPRINKLE) 10 MEQ extended release capsule Take 1 capsule by mouth 2 times daily 180 capsule 3    pravastatin (PRAVACHOL) 10 MG tablet TAKE ONE TABLET BY MOUTH DAILY 90 tablet 3    DULoxetine HCl 40 MG CPEP Take 40 mg by mouth daily 90 capsule 3    brimonidine (ALPHAGAN) 0.2 % ophthalmic solution Place 1 drop into the left eye 2 times daily      RHOPRESSA 0.02 % SOLN Place 1 drop into both eyes daily      zoster recombinant adjuvanted vaccine (SHINGRIX) 50 MCG/0.5ML SUSR injection . 5mL IM X 1 followed by . 5mL IM 2-6 months after dose #1 0.5 mL 0    timolol (TIMOPTIC) 0.5 % ophthalmic solution INSTILL ONE DROP IN THE LEFT EYE DAILY 1 Bottle 0    LUTEIN PO Take by mouth      latanoprost (XALATAN) 0.005 % ophthalmic solution Place 1 drop into the left eye nightly 1 Bottle 1    dorzolamide (TRUSOPT) 2 % ophthalmic solution Place 1 drop into the left eye 2 times daily      acetaminophen (TYLENOL) 500 MG tablet Take 1,000 mg by mouth three times daily      Glucosamine-Chondroit-Vit C-Mn (GLUCOSAMINE

## 2020-07-29 NOTE — Clinical Note
If I already sent this to you, I am sorry :). I was wondering how to get Mrs. Johnson a home INR monitor. She is on Coumadin and does not drive. She currently has a friend who brings her in for [facility] when she is able.

## 2020-07-30 LAB
A/G RATIO: 1.2 (ref 1.1–2.2)
ALBUMIN SERPL-MCNC: 4.1 G/DL (ref 3.4–5)
ALP BLD-CCNC: 82 U/L (ref 40–129)
ALT SERPL-CCNC: 16 U/L (ref 10–40)
ANION GAP SERPL CALCULATED.3IONS-SCNC: 13 MMOL/L (ref 3–16)
AST SERPL-CCNC: 22 U/L (ref 15–37)
BILIRUB SERPL-MCNC: 0.3 MG/DL (ref 0–1)
BUN BLDV-MCNC: 31 MG/DL (ref 7–20)
CALCIUM SERPL-MCNC: 9.4 MG/DL (ref 8.3–10.6)
CHLORIDE BLD-SCNC: 105 MMOL/L (ref 99–110)
CHOLESTEROL, TOTAL: 182 MG/DL (ref 0–199)
CO2: 25 MMOL/L (ref 21–32)
CREAT SERPL-MCNC: 0.8 MG/DL (ref 0.6–1.2)
GFR AFRICAN AMERICAN: >60
GFR NON-AFRICAN AMERICAN: >60
GLOBULIN: 3.3 G/DL
GLUCOSE BLD-MCNC: 102 MG/DL (ref 70–99)
HDLC SERPL-MCNC: 43 MG/DL (ref 40–60)
LDL CHOLESTEROL CALCULATED: 90 MG/DL
POTASSIUM SERPL-SCNC: 4.9 MMOL/L (ref 3.5–5.1)
SODIUM BLD-SCNC: 143 MMOL/L (ref 136–145)
TOTAL PROTEIN: 7.4 G/DL (ref 6.4–8.2)
TRIGL SERPL-MCNC: 244 MG/DL (ref 0–150)
TSH REFLEX: 1.31 UIU/ML (ref 0.27–4.2)
VLDLC SERPL CALC-MCNC: 49 MG/DL

## 2020-07-30 NOTE — PROGRESS NOTES
Dr. Neela Márquez, I picked my colleagues' brains and this is the response I got for setting up for home education, monitoring, and training to use home INR. [email or web address] (this is a [employer])     Referral form  [email or web address]    Thanks!   Og Peña

## 2020-08-04 ENCOUNTER — TELEPHONE (OUTPATIENT)
Dept: INTERNAL MEDICINE CLINIC | Age: 85
End: 2020-08-04

## 2020-09-08 ENCOUNTER — NURSE ONLY (OUTPATIENT)
Dept: INTERNAL MEDICINE CLINIC | Age: 85
End: 2020-09-08
Payer: MEDICARE

## 2020-09-08 PROCEDURE — 85610 PROTHROMBIN TIME: CPT | Performed by: INTERNAL MEDICINE

## 2020-09-09 LAB
INTERNATIONAL NORMALIZATION RATIO, POC: 2.4
PROTHROMBIN TIME, POC: NORMAL

## 2020-10-08 ENCOUNTER — TELEPHONE (OUTPATIENT)
Dept: ADMINISTRATIVE | Age: 85
End: 2020-10-08

## 2020-10-08 ENCOUNTER — NURSE ONLY (OUTPATIENT)
Dept: INTERNAL MEDICINE CLINIC | Age: 85
End: 2020-10-08
Payer: MEDICARE

## 2020-10-08 LAB
INTERNATIONAL NORMALIZATION RATIO, POC: 2.4
PROTHROMBIN TIME, POC: NORMAL

## 2020-10-08 PROCEDURE — G0008 ADMIN INFLUENZA VIRUS VAC: HCPCS | Performed by: INTERNAL MEDICINE

## 2020-10-08 PROCEDURE — 90694 VACC AIIV4 NO PRSRV 0.5ML IM: CPT | Performed by: INTERNAL MEDICINE

## 2020-10-08 PROCEDURE — 85610 PROTHROMBIN TIME: CPT | Performed by: INTERNAL MEDICINE

## 2020-11-10 ENCOUNTER — NURSE ONLY (OUTPATIENT)
Dept: INTERNAL MEDICINE CLINIC | Age: 85
End: 2020-11-10
Payer: MEDICARE

## 2020-11-10 LAB
INTERNATIONAL NORMALIZATION RATIO, POC: 2.7
PROTHROMBIN TIME, POC: NORMAL

## 2020-11-10 PROCEDURE — 85610 PROTHROMBIN TIME: CPT | Performed by: INTERNAL MEDICINE

## 2020-12-01 ENCOUNTER — OFFICE VISIT (OUTPATIENT)
Dept: INTERNAL MEDICINE CLINIC | Age: 85
End: 2020-12-01
Payer: MEDICARE

## 2020-12-01 VITALS
OXYGEN SATURATION: 96 % | HEART RATE: 68 BPM | DIASTOLIC BLOOD PRESSURE: 68 MMHG | WEIGHT: 170.6 LBS | SYSTOLIC BLOOD PRESSURE: 128 MMHG | BODY MASS INDEX: 28.39 KG/M2 | TEMPERATURE: 96.8 F

## 2020-12-01 LAB
INTERNATIONAL NORMALIZATION RATIO, POC: 2.5
PROTHROMBIN TIME, POC: NORMAL

## 2020-12-01 PROCEDURE — 99214 OFFICE O/P EST MOD 30 MIN: CPT | Performed by: INTERNAL MEDICINE

## 2020-12-01 PROCEDURE — 85610 PROTHROMBIN TIME: CPT | Performed by: INTERNAL MEDICINE

## 2020-12-01 NOTE — PROGRESS NOTES
Patient: Mahsa Barillas is a 80 y.o. female who presents today with the following Chief Complaint(s):  Chief Complaint   Patient presents with   [address] [place] [address] Office Visit for Anticoagulation Management     INR 2.5       HPI     Here today for follow up. Had a cyst under her left arm pit. Has drained a little and is getting smaller. 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 Her eye sight is failing d/t glaucoma. Following with Dr. Bowen Corral for ophthalmology. Has been very careful with COVID. HTN- no BP issues that she is aware of, not checking as often during the cooler months. Remains on Cymbalta 40 mg qd for neuropathy. Seems to be helping. Notices that her hands and legs get cool easily. Aspercreme \"sort of\" helps with OA pain in knees. Has been told that she needs at TKA but did not want to do it d/t pain. A.fib- no issues that she is aware of. Son from out of town has been sending her meals from [employer]. Has noticed that since this has been happening she has been having more heart burn. Is wondering if she should take a probioitic per her grandson. Will take jocelyn slices if she knows that she is going to get indigestion.      Allergies   Allergen Reactions    Ace Inhibitors      cough    Catapres [Clonidine Hcl]      patch    Diovan [Valsartan]      ineffective    Eszopiclone      ineffective    Morphine      All derivatives- GI upset      Past Medical History:   Diagnosis Date    Arthritis     OA    Atrial fibrillation (HCC)     Blood transfusion     1975    Carpal tunnel syndrome     mild    Dizziness     Edema     Frequent falls     Glaucoma     [place] (hard of hearing)     bilateral hearing aids    Hx of cholelithiasis     Hyperlipidemia     Hypertension     Osteoarthrosis, unspecified whether generalized or localized, unspecified site     Postmenopausal atrophic vaginitis     Unspecified disorder of autonomic nervous system     Unspecified essential hypertension     Unspecified eustachian tube disorder     Unspecified glaucoma(365.9)     Unspecified hemorrhoids without mention of complication     Urethral stricture unspecified       Past Surgical History:   Procedure Laterality Date    COLONOSCOPY      ELBOW SURGERY      left I&D    EYE SURGERY      cataracts    HEMORRHOID SURGERY      HYSTERECTOMY      1 ovary left ;later oophorectomy     KNEE CARTILAGE SURGERY      LAMINECTOMY  12    L  3-4 bilateral laminectomy discectomy      Social History     Socioeconomic History    Marital status:      Spouse name: Not on file    Number of children: Not on file    Years of education: Not on file    Highest education level: Not on file   Occupational History    Not on file   Social Needs    Financial resource strain: Not hard at all    Food insecurity     Worry: Never true     Inability: Never true   [employer] needs     Medical: No     Non-medical: No   Tobacco Use    Smoking status: Never Smoker    Smokeless tobacco: Never Used   Substance and Sexual Activity    Alcohol use:  Yes     Alcohol/week: 0.0 standard drinks     Comment: wine with meals occ    Drug use: No    Sexual activity: Not on file   Lifestyle    Physical activity     Days per week: Not on file     Minutes per session: Not on file    Stress: Not on file   Relationships    Social connections     Talks on phone: Not on file     Gets together: Not on file     Attends Nondenominational service: Not on file     Active member of club or organization: Not on file     Attends meetings of clubs or organizations: Not on file     Relationship status: Not on file    Intimate partner violence     Fear of current or ex partner: Not on file     Emotionally abused: Not on file     Physically abused: Not on file     Forced sexual activity: Not on file   Other Topics Concern    Not on file   Social History Narrative    3  siblings    9 children    3 sons live close by     Family History   Problem Relation Age of Onset    Diabetes Mother     High Blood Pressure Mother     Heart Disease Mother     Diabetes Brother     High Blood Pressure Brother     Heart Disease Brother     Cancer Brother     Rheum Arthritis Daughter         fibromayalgia    Cancer Brother         prostate cancer     Heart Attack Brother         Outpatient Medications Prior to Visit   Medication Sig Dispense Refill    warfarin (COUMADIN) 5 MG tablet TAKE ONE TABLET BY MOUTH DAILY 90 tablet 1    olmesartan (BENICAR) 20 MG tablet Take 1 tablet by mouth daily 90 tablet 1    triamterene-hydrochlorothiazide (MAXZIDE) 75-50 MG per tablet TAKE 1 TABLET DAILY 90 tablet 3    amLODIPine (NORVASC) 5 MG tablet TAKE 1 TABLET DAILY 90 tablet 3    potassium chloride (KLOR-CON SPRINKLE) 10 MEQ extended release capsule Take 1 capsule by mouth 2 times daily 180 capsule 3    pravastatin (PRAVACHOL) 10 MG tablet TAKE ONE TABLET BY MOUTH DAILY 90 tablet 3    DULoxetine HCl 40 MG CPEP Take 40 mg by mouth daily 90 capsule 3    brimonidine (ALPHAGAN) 0.2 % ophthalmic solution Place 1 drop into the left eye 2 times daily      RHOPRESSA 0.02 % SOLN Place 1 drop into both eyes daily      timolol (TIMOPTIC) 0.5 % ophthalmic solution INSTILL ONE DROP IN THE LEFT EYE DAILY 1 Bottle 0    LUTEIN PO Take by mouth      latanoprost (XALATAN) 0.005 % ophthalmic solution Place 1 drop into the left eye nightly 1 Bottle 1    dorzolamide (TRUSOPT) 2 % ophthalmic solution Place 1 drop into the left eye 2 times daily      acetaminophen (TYLENOL) 500 MG tablet Take 1,000 mg by mouth three times daily      Glucosamine-Chondroit-Vit C-Mn (GLUCOSAMINE CHONDR 500 COMPLEX PO) Take  by mouth.  Coenzyme Q10 (COQ-10 PO) Take  by mouth.  MULTIPLE VITAMIN PO Take  by mouth. No facility-administered medications prior to visit.         Patient'spast medical history, surgical history, family of diclofenac gel prn. Sebaceous cyst     No infection. Open and draining. Remainder of purulent drainage expressed from cyst. Covered with adhesive bandages. Other Visit Diagnoses     Anticoagulated on Coumadin        Relevant Orders    POCT INR (Completed)          Current Outpatient Medications   Medication Sig Dispense Refill    diclofenac sodium (VOLTAREN) 1 % GEL Apply 4 g topically 4 times daily as needed for Pain 100 g 1    warfarin (COUMADIN) 5 MG tablet TAKE ONE TABLET BY MOUTH DAILY 90 tablet 1    olmesartan (BENICAR) 20 MG tablet Take 1 tablet by mouth daily 90 tablet 1    triamterene-hydrochlorothiazide (MAXZIDE) 75-50 MG per tablet TAKE 1 TABLET DAILY 90 tablet 3    amLODIPine (NORVASC) 5 MG tablet TAKE 1 TABLET DAILY 90 tablet 3    potassium chloride (KLOR-CON SPRINKLE) 10 MEQ extended release capsule Take 1 capsule by mouth 2 times daily 180 capsule 3    pravastatin (PRAVACHOL) 10 MG tablet TAKE ONE TABLET BY MOUTH DAILY 90 tablet 3    DULoxetine HCl 40 MG CPEP Take 40 mg by mouth daily 90 capsule 3    brimonidine (ALPHAGAN) 0.2 % ophthalmic solution Place 1 drop into the left eye 2 times daily      RHOPRESSA 0.02 % SOLN Place 1 drop into both eyes daily      timolol (TIMOPTIC) 0.5 % ophthalmic solution INSTILL ONE DROP IN THE LEFT EYE DAILY 1 Bottle 0    LUTEIN PO Take by mouth      latanoprost (XALATAN) 0.005 % ophthalmic solution Place 1 drop into the left eye nightly 1 Bottle 1    dorzolamide (TRUSOPT) 2 % ophthalmic solution Place 1 drop into the left eye 2 times daily      acetaminophen (TYLENOL) 500 MG tablet Take 1,000 mg by mouth three times daily      Glucosamine-Chondroit-Vit C-Mn (GLUCOSAMINE CHONDR 500 COMPLEX PO) Take  by mouth.  Coenzyme Q10 (COQ-10 PO) Take  by mouth.  MULTIPLE VITAMIN PO Take  by mouth. No current facility-administered medications for this visit. Return in about 4 months (around 4/1/2021).

## 2020-12-05 PROBLEM — L72.3 SEBACEOUS CYST: Status: ACTIVE | Noted: 2020-12-05

## 2020-12-06 NOTE — ASSESSMENT & PLAN NOTE
No infection. Open and draining. Remainder of purulent drainage expressed from cyst. Covered with adhesive bandages.

## 2020-12-06 NOTE — ASSESSMENT & PLAN NOTE
Very well controlled. Continue amlodipine 5 mg daily, Benicar 20 mg daily, and Maxide 75/50 mg daily. Check BMP.

## 2021-01-05 DIAGNOSIS — Z79.01 ANTICOAGULATED ON COUMADIN: ICD-10-CM

## 2021-01-05 LAB
INR BLD: 2.78 (ref 0.86–1.14)
PROTHROMBIN TIME: 32.6 SEC (ref 10–13.2)

## 2021-01-08 RX ORDER — WARFARIN SODIUM 5 MG/1
TABLET ORAL
Qty: 30 TABLET | Refills: 1 | Status: SHIPPED | OUTPATIENT
Start: 2021-01-08 | End: 2021-03-24

## 2021-01-17 DIAGNOSIS — E78.5 HYPERLIPIDEMIA, UNSPECIFIED HYPERLIPIDEMIA TYPE: ICD-10-CM

## 2021-01-18 RX ORDER — PRAVASTATIN SODIUM 10 MG
TABLET ORAL
Qty: 90 TABLET | Refills: 2 | Status: SHIPPED | OUTPATIENT
Start: 2021-01-18 | End: 2021-04-29 | Stop reason: SDUPTHER

## 2021-01-20 ENCOUNTER — PATIENT MESSAGE (OUTPATIENT)
Dept: INTERNAL MEDICINE CLINIC | Age: 86
End: 2021-01-20

## 2021-01-21 NOTE — TELEPHONE ENCOUNTER
From: Cande Mosher  To: Tk Saldaña DO  Sent: 1/20/2021 4:59 PM EST  Subject: Prescription Question    I have changed medical plan, from [facility] to [facility]   I need a refill on my Duloxetine Hydrochloride cap [employer] prices are different   I can get 90 60mg in place of 40mg at [employer] [facility] very cheap.  The 60mg may help me more  If you approve, would please order some for me Thank you, Mariam Ormond

## 2021-01-22 RX ORDER — DULOXETIN HYDROCHLORIDE 60 MG/1
60 CAPSULE, DELAYED RELEASE ORAL DAILY
Qty: 90 CAPSULE | Refills: 1 | Status: SHIPPED | OUTPATIENT
Start: 2021-01-22 | End: 2021-04-05 | Stop reason: SDUPTHER

## 2021-01-28 ENCOUNTER — IMMUNIZATION (OUTPATIENT)
Dept: PRIMARY CARE CLINIC | Age: 86
End: 2021-01-28
Payer: MEDICARE

## 2021-01-28 PROCEDURE — 0001A COVID-19, PFIZER VACCINE 30MCG/0.3ML DOSE: CPT | Performed by: FAMILY MEDICINE

## 2021-01-28 PROCEDURE — 91300 COVID-19, PFIZER VACCINE 30MCG/0.3ML DOSE: CPT | Performed by: FAMILY MEDICINE

## 2021-02-02 DIAGNOSIS — Z79.01 ANTICOAGULATED ON COUMADIN: ICD-10-CM

## 2021-02-02 LAB
INR BLD: 2.28 (ref 0.86–1.14)
PROTHROMBIN TIME: 26.7 SEC (ref 10–13.2)

## 2021-02-09 ENCOUNTER — PATIENT MESSAGE (OUTPATIENT)
Dept: INTERNAL MEDICINE CLINIC | Age: 86
End: 2021-02-09

## 2021-02-09 NOTE — TELEPHONE ENCOUNTER
From: Asad Martell  To: Yohannes Apodaca DO  Sent: 2/9/2021 11:10 AM EST  Subject: Test Results Question    Can I make my next INR test for March 4th 1:40?    I have not gotten my results back from my Feb.2nd INR test   Ncia Alcocer

## 2021-02-18 ENCOUNTER — IMMUNIZATION (OUTPATIENT)
Dept: PRIMARY CARE CLINIC | Age: 86
End: 2021-02-18
Payer: MEDICARE

## 2021-02-18 PROCEDURE — 0002A COVID-19, PFIZER VACCINE 30MCG/0.3ML DOSE: CPT | Performed by: FAMILY MEDICINE

## 2021-02-18 PROCEDURE — 91300 COVID-19, PFIZER VACCINE 30MCG/0.3ML DOSE: CPT | Performed by: FAMILY MEDICINE

## 2021-03-04 DIAGNOSIS — Z79.01 ANTICOAGULATED ON COUMADIN: Primary | ICD-10-CM

## 2021-03-04 LAB
INR BLD: 2.33 (ref 0.86–1.14)
PROTHROMBIN TIME: 27.3 SEC (ref 10–13.2)

## 2021-03-24 DIAGNOSIS — I10 ESSENTIAL HYPERTENSION: ICD-10-CM

## 2021-03-24 RX ORDER — WARFARIN SODIUM 5 MG/1
TABLET ORAL
Qty: 30 TABLET | Refills: 0 | Status: SHIPPED | OUTPATIENT
Start: 2021-03-24 | End: 2021-04-29 | Stop reason: SDUPTHER

## 2021-03-25 RX ORDER — AMLODIPINE BESYLATE 5 MG/1
TABLET ORAL
Qty: 90 TABLET | Refills: 2 | Status: SHIPPED | OUTPATIENT
Start: 2021-03-25 | End: 2021-04-29 | Stop reason: SDUPTHER

## 2021-04-05 ENCOUNTER — OFFICE VISIT (OUTPATIENT)
Dept: INTERNAL MEDICINE CLINIC | Age: 86
End: 2021-04-05
Payer: MEDICARE

## 2021-04-05 VITALS
DIASTOLIC BLOOD PRESSURE: 56 MMHG | SYSTOLIC BLOOD PRESSURE: 124 MMHG | HEART RATE: 76 BPM | BODY MASS INDEX: 28.8 KG/M2 | WEIGHT: 173.08 LBS | OXYGEN SATURATION: 95 %

## 2021-04-05 DIAGNOSIS — I10 ESSENTIAL HYPERTENSION: ICD-10-CM

## 2021-04-05 DIAGNOSIS — E78.5 HYPERLIPIDEMIA LDL GOAL <100: ICD-10-CM

## 2021-04-05 DIAGNOSIS — I48.11 LONGSTANDING PERSISTENT ATRIAL FIBRILLATION (HCC): ICD-10-CM

## 2021-04-05 DIAGNOSIS — G60.9 IDIOPATHIC PERIPHERAL NEUROPATHY: ICD-10-CM

## 2021-04-05 DIAGNOSIS — Z91.81 AT HIGH RISK FOR FALLS: ICD-10-CM

## 2021-04-05 DIAGNOSIS — M48.061 SPINAL STENOSIS OF LUMBAR REGION WITHOUT NEUROGENIC CLAUDICATION: ICD-10-CM

## 2021-04-05 DIAGNOSIS — Z79.01 ANTICOAGULATED ON COUMADIN: Primary | ICD-10-CM

## 2021-04-05 LAB
INTERNATIONAL NORMALIZATION RATIO, POC: 2.3
PROTHROMBIN TIME, POC: NORMAL

## 2021-04-05 PROCEDURE — 1036F TOBACCO NON-USER: CPT | Performed by: INTERNAL MEDICINE

## 2021-04-05 PROCEDURE — G8417 CALC BMI ABV UP PARAM F/U: HCPCS | Performed by: INTERNAL MEDICINE

## 2021-04-05 PROCEDURE — 85610 PROTHROMBIN TIME: CPT | Performed by: INTERNAL MEDICINE

## 2021-04-05 PROCEDURE — G8427 DOCREV CUR MEDS BY ELIG CLIN: HCPCS | Performed by: INTERNAL MEDICINE

## 2021-04-05 PROCEDURE — 1123F ACP DISCUSS/DSCN MKR DOCD: CPT | Performed by: INTERNAL MEDICINE

## 2021-04-05 PROCEDURE — 99214 OFFICE O/P EST MOD 30 MIN: CPT | Performed by: INTERNAL MEDICINE

## 2021-04-05 PROCEDURE — 4040F PNEUMOC VAC/ADMIN/RCVD: CPT | Performed by: INTERNAL MEDICINE

## 2021-04-05 PROCEDURE — 1090F PRES/ABSN URINE INCON ASSESS: CPT | Performed by: INTERNAL MEDICINE

## 2021-04-05 RX ORDER — DULOXETINE 40 MG/1
40 CAPSULE, DELAYED RELEASE ORAL DAILY
Qty: 30 CAPSULE | Refills: 0 | Status: SHIPPED
Start: 2021-04-05 | End: 2021-04-09

## 2021-04-05 ASSESSMENT — ENCOUNTER SYMPTOMS
CHEST TIGHTNESS: 0
SHORTNESS OF BREATH: 0
DIARRHEA: 0
CONSTIPATION: 0

## 2021-04-05 ASSESSMENT — PATIENT HEALTH QUESTIONNAIRE - PHQ9
SUM OF ALL RESPONSES TO PHQ QUESTIONS 1-9: 0
SUM OF ALL RESPONSES TO PHQ QUESTIONS 1-9: 0
2. FEELING DOWN, DEPRESSED OR HOPELESS: 0

## 2021-04-05 NOTE — PROGRESS NOTES
Neurosurgery patient: Enma Restrepo is a 80 y.o. female who presents today with the following Chief Complaint(s):  Chief Complaint   Patient presents with   [address] [place] [address] Office Visit for Anticoagulation Management     INR 2.3       HPI     Here today for follow up. Is wondering when the best time of day to take Cymbalta. On 60 mg in the evenings. No side effects. Not sure if it is working. Notices neuropathy pain more so after supper when she is standing and doing dishes. Will have pain and coolness in her lower legs. Gets better when she rest. Does have days without pain. Not sure if it is really helping with her pain. Pain will have pain with walking. Happens if she walks \"not very far\". No depression. Had vascular studies done in 2017 that was normal.     HTN- no issues with blood pressure. Runs in the 120's at home. A.fib- heart has been \"behaving\". no bleeding issues. Did get her COVID-19 vaccines- first vaccine made her tired, second vaccine was very fatigued/felt bad.       Allergies   Allergen Reactions    Ace Inhibitors      cough    Catapres [Clonidine Hcl]      patch    Diovan [Valsartan]      ineffective    Eszopiclone      ineffective    Morphine      All derivatives- GI upset      Past Medical History:   Diagnosis Date    Arthritis     OA    Atrial fibrillation (HCC)     Blood transfusion     1975    Carpal tunnel syndrome     mild    Dizziness     Edema     Frequent falls     Glaucoma     [place] (hard of hearing)     bilateral hearing aids    Hx of cholelithiasis     Hyperlipidemia     Hypertension     Osteoarthrosis, unspecified whether generalized or localized, unspecified site     Postmenopausal atrophic vaginitis     Unspecified disorder of autonomic nervous system     Unspecified essential hypertension     Unspecified eustachian tube disorder     Unspecified glaucoma(365.9)     Unspecified hemorrhoids without mention of complication     Urethral stricture unspecified       Past Surgical History:   Procedure Laterality Date    COLONOSCOPY      ELBOW SURGERY      left I&D    EYE SURGERY      cataracts    HEMORRHOID SURGERY      HYSTERECTOMY      1 ovary left ;later oophorectomy     KNEE CARTILAGE SURGERY      LAMINECTOMY  12    L  3-4 bilateral laminectomy discectomy      Social History     Socioeconomic History    Marital status:      Spouse name: Not on file    Number of children: Not on file    Years of education: Not on file    Highest education level: Not on file   Occupational History    Not on file   Social Needs    Financial resource strain: Not hard at all    Food insecurity     Worry: Never true     Inability: Never true   [employer] needs     Medical: No     Non-medical: No   Tobacco Use    Smoking status: Never Smoker    Smokeless tobacco: Never Used   Substance and Sexual Activity    Alcohol use:  Yes     Alcohol/week: 0.0 standard drinks     Comment: wine with meals occ    Drug use: No    Sexual activity: Not on file   Lifestyle    Physical activity     Days per week: Not on file     Minutes per session: Not on file    Stress: Not on file   Relationships    Social connections     Talks on phone: Not on file     Gets together: Not on file     Attends Taoist service: Not on file     Active member of club or organization: Not on file     Attends meetings of clubs or organizations: Not on file     Relationship status: Not on file    Intimate partner violence     Fear of current or ex partner: Not on file     Emotionally abused: Not on file     Physically abused: Not on file     Forced sexual activity: Not on file   Other Topics Concern    Not on file   Social History Narrative    3  siblings    9 children    3 sons live close by     Family History   Problem Relation Age of Onset    Diabetes Mother     High Blood Pressure Mother     Heart Disease Mother     Diabetes Brother     High Blood Pressure updated as appropriate today. Review of Systems   Constitutional: Negative for appetite change, fatigue and fever. Respiratory: Negative for chest tightness and shortness of breath. Cardiovascular: Negative for chest pain. Gastrointestinal: Negative for constipation and diarrhea. Skin: Negative for rash. BP (!) 124/56   Pulse 76   Wt 173 lb 1.3 oz (78.5 kg)   SpO2 95%   BMI 28.80 kg/m²   Physical Exam  Vitals signs and nursing note reviewed. Constitutional:       Appearance: She is well-developed. She is not toxic-appearing. HENT:      Head: Normocephalic. Right Ear: Tympanic membrane, ear canal and external ear normal.      Left Ear: Tympanic membrane, ear canal and external ear normal.      Nose: Nose normal.      Mouth/Throat:      Mouth: Mucous membranes are moist.      Pharynx: No oropharyngeal exudate or posterior oropharyngeal erythema. Neck:      Thyroid: No thyroid mass or thyromegaly. Vascular: No carotid bruit. Cardiovascular:      Rate and Rhythm: Normal rate and regular rhythm. Pulses:           Dorsalis pedis pulses are 1+ on the right side and 1+ on the left side. Posterior tibial pulses are 1+ on the right side and 1+ on the left side. Heart sounds: Normal heart sounds. No murmur. Pulmonary:      Effort: Pulmonary effort is normal.      Breath sounds: Normal breath sounds. Lymphadenopathy:      Cervical: No cervical adenopathy. Neurological:      Mental Status: She is alert. Psychiatric:         Mood and Affect: Mood normal.         Behavior: Behavior normal. Behavior is cooperative. ASSESSMENT/PLAN:    Problem List Items Addressed This Visit     At high risk for falls      No recent falls at home. Atrial fibrillation ([address]     Remains asymptomatic. Continue Coumadin 5 mg qd (INR 2.5). no betablocker d/t bradycardia. Repeat INR in 1 month. Essential hypertension     Well-controlled.   Continue amlodipine 5 mg daily, Benicar 20 mg daily, and Maxide 75/50 mg daily. Relevant Orders    CBC Auto Differential (Completed)    Comprehensive Metabolic Panel (Completed)    Hyperlipidemia LDL goal <100      Check lipid panel, CMP. Continue pravastatin 10 mg daily. Relevant Orders    Comprehensive Metabolic Panel (Completed)    Lipid Panel (Completed)    Idiopathic peripheral neuropathy      No improvement in symptoms despite increased dose of Cymbalta. Will wean off of Cymbalta. Decrease Cymbalta to 40 mg daily for 1 month then 30 mg daily for 1 month then 20 mg daily for 1 month then 20 mg every other day for 1 month. If she notices worsening of symptoms during the wean will resume previous dose. Spinal stenosis of lumbar region      Has not noticed improvement in her neuropathy symptoms with increased dose of Cymbalta. Will wean off of Cymbalta. This is likely the etiology of her peripheral neuropathy.            Other Visit Diagnoses     Anticoagulated on Coumadin    -  Primary    Relevant Orders    POCT INR (Completed)          Current Outpatient Medications   Medication Sig Dispense Refill    amLODIPine (NORVASC) 5 MG tablet TAKE ONE TABLET BY MOUTH DAILY 90 tablet 2    warfarin (COUMADIN) 5 MG tablet TAKE ONE TABLET BY MOUTH DAILY 30 tablet 0    olmesartan (BENICAR) 20 MG tablet TAKE ONE TABLET BY MOUTH DAILY 30 tablet 1    pravastatin (PRAVACHOL) 10 MG tablet TAKE ONE TABLET BY MOUTH DAILY 90 tablet 2    diclofenac sodium (VOLTAREN) 1 % GEL Apply 4 g topically 4 times daily as needed for Pain 100 g 1    triamterene-hydrochlorothiazide (MAXZIDE) 75-50 MG per tablet TAKE 1 TABLET DAILY 90 tablet 3    potassium chloride (KLOR-CON SPRINKLE) 10 MEQ extended release capsule Take 1 capsule by mouth 2 times daily 180 capsule 3    brimonidine (ALPHAGAN) 0.2 % ophthalmic solution Place 1 drop into the left eye 2 times daily      RHOPRESSA 0.02 % SOLN Place 1 drop into both eyes daily      timolol (TIMOPTIC) 0.5 % ophthalmic solution INSTILL ONE DROP IN THE LEFT EYE DAILY 1 Bottle 0    LUTEIN PO Take by mouth      latanoprost (XALATAN) 0.005 % ophthalmic solution Place 1 drop into the left eye nightly 1 Bottle 1    dorzolamide (TRUSOPT) 2 % ophthalmic solution Place 1 drop into the left eye 2 times daily      acetaminophen (TYLENOL) 500 MG tablet Take 1,000 mg by mouth three times daily      Glucosamine-Chondroit-Vit C-Mn (GLUCOSAMINE CHONDR 500 COMPLEX PO) Take  by mouth.  Coenzyme Q10 (COQ-10 PO) Take  by mouth.  MULTIPLE VITAMIN PO Take  by mouth.  DULoxetine (CYMBALTA) 20 MG extended release capsule Take 2 capsules by mouth daily 60 capsule 1     No current facility-administered medications for this visit. Return in about 4 months (around 8/5/2021) for sooner if needed. INR in 4-6 weeks. .  On the basis of positive falls risk screening, assessment and plan is as follows: patient declines any further evaluation/treatment for increased falls risk.

## 2021-04-06 ENCOUNTER — PATIENT MESSAGE (OUTPATIENT)
Dept: INTERNAL MEDICINE CLINIC | Age: 86
End: 2021-04-06

## 2021-04-06 LAB
A/G RATIO: 1.1 (ref 1.1–2.2)
ALBUMIN SERPL-MCNC: 3.8 G/DL (ref 3.4–5)
ALP BLD-CCNC: 79 U/L (ref 40–129)
ALT SERPL-CCNC: 9 U/L (ref 10–40)
ANION GAP SERPL CALCULATED.3IONS-SCNC: 11 MMOL/L (ref 3–16)
AST SERPL-CCNC: 19 U/L (ref 15–37)
BASOPHILS ABSOLUTE: 0.1 K/UL (ref 0–0.2)
BASOPHILS RELATIVE PERCENT: 0.7 %
BILIRUB SERPL-MCNC: 0.3 MG/DL (ref 0–1)
BUN BLDV-MCNC: 35 MG/DL (ref 7–20)
CALCIUM SERPL-MCNC: 9.1 MG/DL (ref 8.3–10.6)
CHLORIDE BLD-SCNC: 103 MMOL/L (ref 99–110)
CHOLESTEROL, TOTAL: 185 MG/DL (ref 0–199)
CO2: 24 MMOL/L (ref 21–32)
CREAT SERPL-MCNC: 1 MG/DL (ref 0.6–1.2)
EOSINOPHILS ABSOLUTE: 0.4 K/UL (ref 0–0.6)
EOSINOPHILS RELATIVE PERCENT: 5.1 %
GFR AFRICAN AMERICAN: >60
GFR NON-AFRICAN AMERICAN: 52
GLOBULIN: 3.5 G/DL
GLUCOSE BLD-MCNC: 98 MG/DL (ref 70–99)
HCT VFR BLD CALC: 39 % (ref 36–48)
HDLC SERPL-MCNC: 41 MG/DL (ref 40–60)
HEMOGLOBIN: 12.7 G/DL (ref 12–16)
LDL CHOLESTEROL CALCULATED: 95 MG/DL
LYMPHOCYTES ABSOLUTE: 2.4 K/UL (ref 1–5.1)
LYMPHOCYTES RELATIVE PERCENT: 29.2 %
MCH RBC QN AUTO: 31.3 PG (ref 26–34)
MCHC RBC AUTO-ENTMCNC: 32.6 G/DL (ref 31–36)
MCV RBC AUTO: 96 FL (ref 80–100)
MONOCYTES ABSOLUTE: 0.7 K/UL (ref 0–1.3)
MONOCYTES RELATIVE PERCENT: 8.5 %
NEUTROPHILS ABSOLUTE: 4.7 K/UL (ref 1.7–7.7)
NEUTROPHILS RELATIVE PERCENT: 56.5 %
PDW BLD-RTO: 14.1 % (ref 12.4–15.4)
PLATELET # BLD: 216 K/UL (ref 135–450)
PMV BLD AUTO: 10 FL (ref 5–10.5)
POTASSIUM SERPL-SCNC: 3.8 MMOL/L (ref 3.5–5.1)
RBC # BLD: 4.06 M/UL (ref 4–5.2)
SODIUM BLD-SCNC: 138 MMOL/L (ref 136–145)
TOTAL PROTEIN: 7.3 G/DL (ref 6.4–8.2)
TRIGL SERPL-MCNC: 245 MG/DL (ref 0–150)
VLDLC SERPL CALC-MCNC: 49 MG/DL
WBC # BLD: 8.3 K/UL (ref 4–11)

## 2021-04-09 RX ORDER — DULOXETIN HYDROCHLORIDE 20 MG/1
40 CAPSULE, DELAYED RELEASE ORAL DAILY
Qty: 60 CAPSULE | Refills: 1 | Status: SHIPPED | OUTPATIENT
Start: 2021-04-09 | End: 2021-05-06 | Stop reason: SDUPTHER

## 2021-04-11 NOTE — ASSESSMENT & PLAN NOTE
Remains asymptomatic. Continue Coumadin 5 mg qd (INR 2.5). no betablocker d/t bradycardia. Repeat INR in 1 month.

## 2021-04-11 NOTE — ASSESSMENT & PLAN NOTE
No improvement in symptoms despite increased dose of Cymbalta. Will wean off of Cymbalta. Decrease Cymbalta to 40 mg daily for 1 month then 30 mg daily for 1 month then 20 mg daily for 1 month then 20 mg every other day for 1 month. If she notices worsening of symptoms during the wean will resume previous dose.

## 2021-04-26 ENCOUNTER — OFFICE VISIT (OUTPATIENT)
Dept: INTERNAL MEDICINE CLINIC | Age: 86
End: 2021-04-26
Payer: MEDICARE

## 2021-04-26 VITALS
DIASTOLIC BLOOD PRESSURE: 58 MMHG | HEART RATE: 72 BPM | HEIGHT: 65 IN | SYSTOLIC BLOOD PRESSURE: 120 MMHG | WEIGHT: 174 LBS | BODY MASS INDEX: 28.99 KG/M2 | OXYGEN SATURATION: 98 %

## 2021-04-26 DIAGNOSIS — S70.11XA CONTUSION OF RIGHT THIGH, INITIAL ENCOUNTER: Primary | ICD-10-CM

## 2021-04-26 PROCEDURE — 1123F ACP DISCUSS/DSCN MKR DOCD: CPT | Performed by: NURSE PRACTITIONER

## 2021-04-26 PROCEDURE — G8417 CALC BMI ABV UP PARAM F/U: HCPCS | Performed by: NURSE PRACTITIONER

## 2021-04-26 PROCEDURE — 1090F PRES/ABSN URINE INCON ASSESS: CPT | Performed by: NURSE PRACTITIONER

## 2021-04-26 PROCEDURE — 99213 OFFICE O/P EST LOW 20 MIN: CPT | Performed by: NURSE PRACTITIONER

## 2021-04-26 PROCEDURE — 1036F TOBACCO NON-USER: CPT | Performed by: NURSE PRACTITIONER

## 2021-04-26 PROCEDURE — G8427 DOCREV CUR MEDS BY ELIG CLIN: HCPCS | Performed by: NURSE PRACTITIONER

## 2021-04-26 PROCEDURE — 4040F PNEUMOC VAC/ADMIN/RCVD: CPT | Performed by: NURSE PRACTITIONER

## 2021-04-26 ASSESSMENT — ENCOUNTER SYMPTOMS: COLOR CHANGE: 1

## 2021-04-26 NOTE — PROGRESS NOTES
SUBJECTIVE:    Patient ID: Glenda De Anda is a 80 y.o. female. CC: Bruising    HPI: The patient presents to the office for an acute visit. She was in her normal state of health. She typically gets up at night at least once. She got up quickly 4 nights ago. She felt sharp pain immediately, thought she \"pulled a muscle. \"  She moved to her chair and noticed bruising in the area of pain. This bruise expanded. No swelling. Some soreness. She started ice yesterday. Other than bruising and tenderness, no other symptoms. She is walking fine.       Current Outpatient Medications   Medication Sig Dispense Refill    DULoxetine (CYMBALTA) 20 MG extended release capsule Take 2 capsules by mouth daily 60 capsule 1    amLODIPine (NORVASC) 5 MG tablet TAKE ONE TABLET BY MOUTH DAILY 90 tablet 2    warfarin (COUMADIN) 5 MG tablet TAKE ONE TABLET BY MOUTH DAILY 30 tablet 0    olmesartan (BENICAR) 20 MG tablet TAKE ONE TABLET BY MOUTH DAILY 30 tablet 1    pravastatin (PRAVACHOL) 10 MG tablet TAKE ONE TABLET BY MOUTH DAILY 90 tablet 2    diclofenac sodium (VOLTAREN) 1 % GEL Apply 4 g topically 4 times daily as needed for Pain 100 g 1    triamterene-hydrochlorothiazide (MAXZIDE) 75-50 MG per tablet TAKE 1 TABLET DAILY 90 tablet 3    potassium chloride (KLOR-CON SPRINKLE) 10 MEQ extended release capsule Take 1 capsule by mouth 2 times daily 180 capsule 3    brimonidine (ALPHAGAN) 0.2 % ophthalmic solution Place 1 drop into the left eye 2 times daily      RHOPRESSA 0.02 % SOLN Place 1 drop into both eyes daily      timolol (TIMOPTIC) 0.5 % ophthalmic solution INSTILL ONE DROP IN THE LEFT EYE DAILY 1 Bottle 0    LUTEIN PO Take by mouth      latanoprost (XALATAN) 0.005 % ophthalmic solution Place 1 drop into the left eye nightly 1 Bottle 1    dorzolamide (TRUSOPT) 2 % ophthalmic solution Place 1 drop into the left eye 2 times daily      acetaminophen (TYLENOL) 500 MG tablet Take 1,000 mg by mouth three times daily      Glucosamine-Chondroit-Vit C-Mn (GLUCOSAMINE CHONDR 500 COMPLEX PO) Take  by mouth.  Coenzyme Q10 (COQ-10 PO) Take  by mouth.  MULTIPLE VITAMIN PO Take  by mouth. No current facility-administered medications for this visit. Review of Systems   Musculoskeletal: Negative for arthralgias and gait problem. Skin: Positive for color change. All other systems reviewed and are negative. OBJECTIVE:  Physical Exam  Constitutional:       Appearance: Normal appearance. HENT:      Head: Normocephalic and atraumatic. Skin:     General: Skin is warm. Findings: Bruising present. Comments: There is bruising of the medial right thigh extending from a few inches above the knee to a few inches below the groin. There is some induration on the proximal end of the bruising and this area has tenderness. There is no fluctuance. There are no open lesions or sores. There is minimal warmth consistent with contusion. There are no red streaks or evidence of secondary infection. Neurological:      General: No focal deficit present. Mental Status: She is alert and oriented to person, place, and time. Psychiatric:         Mood and Affect: Mood normal.         Behavior: Behavior normal.        BP (!) 120/58   Pulse 72   Ht 5' 5\" (1.651 m)   Wt 174 lb (78.9 kg)   SpO2 98%   BMI 28.96 kg/m²      PHQ Scores 4/5/2021 2/28/2020 6/17/2019 10/25/2018 12/7/2017   PHQ2 Score 0 0 0 0 0   PHQ9 Score 0 0 0 0 0     Interpretation of Total Score Depression Severity: 1-4 = Minimal depression, 5-9 = Mild depression, 10-14 = Moderate depression, 15-19 = Moderately severe depression, 20-27 =Severe depression      ASSESSMENT/PLAN:  Ayleen Alvarez was seen today for other. Diagnoses and all orders for this visit:    Contusion of right thigh, initial encounter  -Based on history, patient got up during the night experience some acute thigh pain which resolved quickly but she had bruising after. Appears consistent with a contusion of the right thigh, possible minor injury with exacerbated bruising due to anticoagulation. The bruising is starting to resolve and she denies any significant spreading over the past few days. She has no loss of function and minimal discomfort. Recommended she continue ice and Tylenol as needed. We discussed red flags and she will monitor for worsening.         Jazlyn Chaves, APRN - CNP

## 2021-04-29 DIAGNOSIS — E78.5 HYPERLIPIDEMIA, UNSPECIFIED HYPERLIPIDEMIA TYPE: ICD-10-CM

## 2021-04-29 DIAGNOSIS — I10 ESSENTIAL HYPERTENSION: ICD-10-CM

## 2021-04-29 NOTE — TELEPHONE ENCOUNTER
----- Message from Famfauzia Frias sent at 4/28/2021  6:44 PM EDT -----  Subject: Medication Problem    QUESTIONS  Name of Medication? triamterene-hydrochlorothiazide (MAXZIDE) 75-50 MG per   tablet  Patient-reported dosage and instructions? Multiple patient   dosages/instructions see below for medications. What question or problem do you have with the medication? Pt's pharmacy   [email or web address] sent over a fax for a 90 day supply and called to check on this for   an update. Fax # [phone] and phone number [phone]. Preferred [facility]acy? [facility] [phone] [address] [place] [phone]  Pharmacy phone number (if available)? [phone]  Additional Information for Provider? triamterene-hydrochlorothiazide   (MAXZIDE) 75-50 MG per tablet amLODIPine (NORVASC) 5 MG tablet pravastatin   (PRAVACHOL) 10 MG tablet potassium chloride (KLOR-CON SPRINKLE) 10 MEQ   extended release capsule olmesartan (BENICAR) 20 MG tablet warfarin   (COUMADIN  ---------------------------------------------------------------------------  --------------  CALL BACK INFO  What is the best way for the office to contact you? OK to leave message on   voicemail  Preferred Call Back Phone Number? [phone]  ---------------------------------------------------------------------------  --------------  SCRIPT ANSWERS  Relationship to Patient? Third Party  Representative Name?  [id number] [address]

## 2021-04-30 RX ORDER — OLMESARTAN MEDOXOMIL 20 MG/1
20 TABLET ORAL DAILY
Qty: 90 TABLET | Refills: 3 | Status: SHIPPED | OUTPATIENT
Start: 2021-04-30 | End: 2021-05-06 | Stop reason: SDUPTHER

## 2021-04-30 RX ORDER — POTASSIUM CHLORIDE 750 MG/1
10 CAPSULE, EXTENDED RELEASE ORAL 2 TIMES DAILY
Qty: 180 CAPSULE | Refills: 3 | Status: SHIPPED | OUTPATIENT
Start: 2021-04-30 | End: 2021-07-30 | Stop reason: SDUPTHER

## 2021-04-30 RX ORDER — TRIAMTERENE AND HYDROCHLOROTHIAZIDE 75; 50 MG/1; MG/1
TABLET ORAL
Qty: 90 TABLET | Refills: 3 | Status: SHIPPED | OUTPATIENT
Start: 2021-04-30 | End: 2021-06-11 | Stop reason: SDUPTHER

## 2021-04-30 RX ORDER — WARFARIN SODIUM 5 MG/1
TABLET ORAL
Qty: 90 TABLET | Refills: 3 | Status: SHIPPED | OUTPATIENT
Start: 2021-04-30 | End: 2021-05-14 | Stop reason: SDUPTHER

## 2021-04-30 RX ORDER — PRAVASTATIN SODIUM 10 MG
TABLET ORAL
Qty: 90 TABLET | Refills: 3 | Status: SHIPPED | OUTPATIENT
Start: 2021-04-30 | End: 2021-05-14 | Stop reason: SDUPTHER

## 2021-04-30 RX ORDER — AMLODIPINE BESYLATE 5 MG/1
TABLET ORAL
Qty: 90 TABLET | Refills: 3 | Status: SHIPPED | OUTPATIENT
Start: 2021-04-30 | End: 2021-10-11 | Stop reason: SDUPTHER

## 2021-05-04 ENCOUNTER — OFFICE VISIT (OUTPATIENT)
Dept: INTERNAL MEDICINE CLINIC | Age: 86
End: 2021-05-04
Payer: MEDICARE

## 2021-05-04 VITALS
HEART RATE: 72 BPM | WEIGHT: 174 LBS | DIASTOLIC BLOOD PRESSURE: 68 MMHG | SYSTOLIC BLOOD PRESSURE: 142 MMHG | BODY MASS INDEX: 28.96 KG/M2

## 2021-05-04 DIAGNOSIS — T14.8XXA HEMATOMA: ICD-10-CM

## 2021-05-04 DIAGNOSIS — Z79.01 ANTICOAGULATED ON COUMADIN: Primary | ICD-10-CM

## 2021-05-04 DIAGNOSIS — I10 ESSENTIAL HYPERTENSION: ICD-10-CM

## 2021-05-04 LAB
INTERNATIONAL NORMALIZATION RATIO, POC: 2.4
PROTHROMBIN TIME, POC: NORMAL

## 2021-05-04 PROCEDURE — 1036F TOBACCO NON-USER: CPT | Performed by: INTERNAL MEDICINE

## 2021-05-04 PROCEDURE — 99213 OFFICE O/P EST LOW 20 MIN: CPT | Performed by: INTERNAL MEDICINE

## 2021-05-04 PROCEDURE — G8417 CALC BMI ABV UP PARAM F/U: HCPCS | Performed by: INTERNAL MEDICINE

## 2021-05-04 PROCEDURE — 1090F PRES/ABSN URINE INCON ASSESS: CPT | Performed by: INTERNAL MEDICINE

## 2021-05-04 PROCEDURE — 1123F ACP DISCUSS/DSCN MKR DOCD: CPT | Performed by: INTERNAL MEDICINE

## 2021-05-04 PROCEDURE — G8427 DOCREV CUR MEDS BY ELIG CLIN: HCPCS | Performed by: INTERNAL MEDICINE

## 2021-05-04 PROCEDURE — 4040F PNEUMOC VAC/ADMIN/RCVD: CPT | Performed by: INTERNAL MEDICINE

## 2021-05-04 PROCEDURE — 85610 PROTHROMBIN TIME: CPT | Performed by: INTERNAL MEDICINE

## 2021-05-04 NOTE — PROGRESS NOTES
Patient: Nereyda Back is a 80 y.o. female who presents today with the following Chief Complaint(s):  Chief Complaint   Patient presents with    Office Visit for Anticoagulation Management     INR 2.4    Check-Up       HPI     Was here last week for a hematoma on her right thigh. Started when she jumped up \"real quick\" out of bed about 1 week ago in the middle of the night 4/23. [place] a quick pain, went back to bed and noticed that she had a bruise on Saturday. Bruise was getting bigger. Saw AVA Gates CNP on /26/21. Was concerned that it was going to keep bleeding. Bruise stopped spreading for a few days, has cleared in her upper thigh but is lower on her leg. No pain.      Allergies   Allergen Reactions    Ace Inhibitors      cough    Catapres [Clonidine Hcl]      patch    Diovan [Valsartan]      ineffective    Eszopiclone      ineffective    Morphine      All derivatives- GI upset      Past Medical History:   Diagnosis Date    Arthritis     OA    Atrial fibrillation (HCC)     Blood transfusion     1975    Carpal tunnel syndrome     mild    Dizziness     Edema     Frequent falls     Glaucoma     [place] (hard of hearing)     bilateral hearing aids    Hx of cholelithiasis     Hyperlipidemia     Hypertension     Osteoarthrosis, unspecified whether generalized or localized, unspecified site     Postmenopausal atrophic vaginitis     Unspecified disorder of autonomic nervous system     Unspecified essential hypertension     Unspecified eustachian tube disorder     Unspecified glaucoma(365.9)     Unspecified hemorrhoids without mention of complication     Urethral stricture unspecified       Past Surgical History:   Procedure Laterality Date    COLONOSCOPY      ELBOW SURGERY      left I&D    EYE SURGERY      cataracts    HEMORRHOID SURGERY      HYSTERECTOMY      1 ovary left ;later oophorectomy     KNEE CARTILAGE SURGERY      LAMINECTOMY  1/19/12    L  3-4 bilateral laminectomy discectomy      Social History     Socioeconomic History    Marital status:      Spouse name: Not on file    Number of children: Not on file    Years of education: Not on file    Highest education level: Not on file   Occupational History    Not on file   Social Needs    Financial resource strain: Not hard at all    Food insecurity     Worry: Never true     Inability: Never true   [employer] needs     Medical: No     Non-medical: No   Tobacco Use    Smoking status: Never Smoker    Smokeless tobacco: Never Used   Substance and Sexual Activity    Alcohol use:  Yes     Alcohol/week: 0.0 standard drinks     Comment: wine with meals occ    Drug use: No    Sexual activity: Not on file   Lifestyle    Physical activity     Days per week: Not on file     Minutes per session: Not on file    Stress: Not on file   Relationships    Social connections     Talks on phone: Not on file     Gets together: Not on file     Attends Synagogue service: Not on file     Active member of club or organization: Not on file     Attends meetings of clubs or organizations: Not on file     Relationship status: Not on file    Intimate partner violence     Fear of current or ex partner: Not on file     Emotionally abused: Not on file     Physically abused: Not on file     Forced sexual activity: Not on file   Other Topics Concern    Not on file   Social History Narrative    3  siblings    9 children    3 sons live close by     Family History   Problem Relation Age of Onset    Diabetes Mother     High Blood Pressure Mother     Heart Disease Mother     Diabetes Brother     High Blood Pressure Brother     Heart Disease Brother     Cancer Brother     Rheum Arthritis Daughter         fibromayalgia    Cancer Brother         prostate cancer     Heart Attack Brother         Outpatient Medications Prior to Visit   Medication Sig Dispense Refill    triamterene-hydroCHLOROthiazide (MAXZIDE) 75-50 MG per tablet TAKE 1 TABLET DAILY 90 tablet 3    potassium chloride (KLOR-CON SPRINKLE) 10 MEQ extended release capsule Take 1 capsule by mouth 2 times daily 180 capsule 3    amLODIPine (NORVASC) 5 MG tablet TAKE ONE TABLET BY MOUTH DAILY 90 tablet 3    pravastatin (PRAVACHOL) 10 MG tablet Take 1 tablet daily 90 tablet 3    warfarin (COUMADIN) 5 MG tablet 1 tablet daily 90 tablet 3    olmesartan (BENICAR) 20 MG tablet Take 1 tablet by mouth daily 90 tablet 3    DULoxetine (CYMBALTA) 20 MG extended release capsule Take 2 capsules by mouth daily 60 capsule 1    diclofenac sodium (VOLTAREN) 1 % GEL Apply 4 g topically 4 times daily as needed for Pain 100 g 1    brimonidine (ALPHAGAN) 0.2 % ophthalmic solution Place 1 drop into the left eye 2 times daily      RHOPRESSA 0.02 % SOLN Place 1 drop into both eyes daily      timolol (TIMOPTIC) 0.5 % ophthalmic solution INSTILL ONE DROP IN THE LEFT EYE DAILY 1 Bottle 0    LUTEIN PO Take by mouth      latanoprost (XALATAN) 0.005 % ophthalmic solution Place 1 drop into the left eye nightly 1 Bottle 1    dorzolamide (TRUSOPT) 2 % ophthalmic solution Place 1 drop into the left eye 2 times daily      acetaminophen (TYLENOL) 500 MG tablet Take 1,000 mg by mouth three times daily      Glucosamine-Chondroit-Vit C-Mn (GLUCOSAMINE CHONDR 500 COMPLEX PO) Take  by mouth.  Coenzyme Q10 (COQ-10 PO) Take  by mouth.  MULTIPLE VITAMIN PO Take  by mouth. No facility-administered medications prior to visit. Patient'spast medical history, surgical history, family history, medications,  and allergies  were all reviewed and updated as appropriate today. Review of Systems   Constitutional: Negative for fatigue. Cardiovascular: Negative for leg swelling. Musculoskeletal: Negative for gait problem. Neurological: Negative for light-headedness.        BP (!) 142/68   Pulse 72   Wt 174 lb (78.9 kg)   BMI 28.96 kg/m²   Physical Exam  Constitutional: Appearance: Normal appearance. Skin:     Findings: Bruising present. Comments: No palpable hematoma. Leg is soft w/out mass. Neurological:      General: No focal deficit present. Mental Status: She is alert and oriented to person, place, and time. Psychiatric:         Mood and Affect: Mood normal.         Behavior: Behavior normal.                 ASSESSMENT/PLAN:    Problem List Items Addressed This Visit     Essential hypertension    Relevant Orders    CBC Auto Differential    BASIC METABOLIC PANEL    Hematoma     INR is therapeutic. Hematoma does not appear to have been increasing in size. Reassured patient that her hematoma is tracking down her leg due to gravity. Recommend use of warm compresses to help with reabsorption. No need to hold Coumadin at this time.            Other Visit Diagnoses     Anticoagulated on Coumadin    -  Primary    Relevant Orders    POCT INR (Completed)          Current Outpatient Medications   Medication Sig Dispense Refill    triamterene-hydroCHLOROthiazide (MAXZIDE) 75-50 MG per tablet TAKE 1 TABLET DAILY 90 tablet 3    potassium chloride (KLOR-CON SPRINKLE) 10 MEQ extended release capsule Take 1 capsule by mouth 2 times daily 180 capsule 3    amLODIPine (NORVASC) 5 MG tablet TAKE ONE TABLET BY MOUTH DAILY 90 tablet 3    pravastatin (PRAVACHOL) 10 MG tablet Take 1 tablet daily 90 tablet 3    warfarin (COUMADIN) 5 MG tablet 1 tablet daily 90 tablet 3    diclofenac sodium (VOLTAREN) 1 % GEL Apply 4 g topically 4 times daily as needed for Pain 100 g 1    brimonidine (ALPHAGAN) 0.2 % ophthalmic solution Place 1 drop into the left eye 2 times daily      RHOPRESSA 0.02 % SOLN Place 1 drop into both eyes daily      timolol (TIMOPTIC) 0.5 % ophthalmic solution INSTILL ONE DROP IN THE LEFT EYE DAILY 1 Bottle 0    LUTEIN PO Take by mouth      latanoprost (XALATAN) 0.005 % ophthalmic solution Place 1 drop into the left eye nightly 1 Bottle 1    dorzolamide

## 2021-05-09 NOTE — ASSESSMENT & PLAN NOTE
INR is therapeutic. Hematoma does not appear to have been increasing in size. Reassured patient that her hematoma is tracking down her leg due to gravity. Recommend use of warm compresses to help with reabsorption. No need to hold Coumadin at this time.

## 2021-05-10 RX ORDER — DULOXETIN HYDROCHLORIDE 30 MG/1
30 CAPSULE, DELAYED RELEASE ORAL DAILY
Qty: 30 CAPSULE | Refills: 0 | Status: SHIPPED | OUTPATIENT
Start: 2021-05-10 | End: 2021-06-15 | Stop reason: SDUPTHER

## 2021-05-14 DIAGNOSIS — E78.5 HYPERLIPIDEMIA, UNSPECIFIED HYPERLIPIDEMIA TYPE: ICD-10-CM

## 2021-05-14 RX ORDER — WARFARIN SODIUM 5 MG/1
TABLET ORAL
Qty: 90 TABLET | Refills: 3 | Status: SHIPPED | OUTPATIENT
Start: 2021-05-14 | End: 2022-02-21

## 2021-05-14 RX ORDER — PRAVASTATIN SODIUM 10 MG
TABLET ORAL
Qty: 90 TABLET | Refills: 3 | Status: SHIPPED | OUTPATIENT
Start: 2021-05-14 | End: 2022-02-21

## 2021-06-04 ENCOUNTER — NURSE ONLY (OUTPATIENT)
Dept: INTERNAL MEDICINE CLINIC | Age: 86
End: 2021-06-04
Payer: MEDICARE

## 2021-06-04 DIAGNOSIS — Z79.01 ANTICOAGULATED ON COUMADIN: Primary | ICD-10-CM

## 2021-06-04 DIAGNOSIS — I10 ESSENTIAL HYPERTENSION: ICD-10-CM

## 2021-06-04 LAB
BASOPHILS ABSOLUTE: 0.1 K/UL (ref 0–0.2)
BASOPHILS RELATIVE PERCENT: 0.6 %
EOSINOPHILS ABSOLUTE: 0.5 K/UL (ref 0–0.6)
EOSINOPHILS RELATIVE PERCENT: 5.3 %
HCT VFR BLD CALC: 37.9 % (ref 36–48)
HEMOGLOBIN: 12.6 G/DL (ref 12–16)
INTERNATIONAL NORMALIZATION RATIO, POC: 3.2
LYMPHOCYTES ABSOLUTE: 2.9 K/UL (ref 1–5.1)
LYMPHOCYTES RELATIVE PERCENT: 29.6 %
MCH RBC QN AUTO: 31.8 PG (ref 26–34)
MCHC RBC AUTO-ENTMCNC: 33.4 G/DL (ref 31–36)
MCV RBC AUTO: 95.4 FL (ref 80–100)
MONOCYTES ABSOLUTE: 0.8 K/UL (ref 0–1.3)
MONOCYTES RELATIVE PERCENT: 8.2 %
NEUTROPHILS ABSOLUTE: 5.5 K/UL (ref 1.7–7.7)
NEUTROPHILS RELATIVE PERCENT: 56.3 %
PDW BLD-RTO: 14.8 % (ref 12.4–15.4)
PLATELET # BLD: 213 K/UL (ref 135–450)
PMV BLD AUTO: 10.3 FL (ref 5–10.5)
PROTHROMBIN TIME, POC: NORMAL
RBC # BLD: 3.97 M/UL (ref 4–5.2)
WBC # BLD: 9.7 K/UL (ref 4–11)

## 2021-06-04 PROCEDURE — 85610 PROTHROMBIN TIME: CPT | Performed by: INTERNAL MEDICINE

## 2021-06-05 LAB
ANION GAP SERPL CALCULATED.3IONS-SCNC: 13 MMOL/L (ref 3–16)
BUN BLDV-MCNC: 36 MG/DL (ref 7–20)
CALCIUM SERPL-MCNC: 9.5 MG/DL (ref 8.3–10.6)
CHLORIDE BLD-SCNC: 105 MMOL/L (ref 99–110)
CO2: 23 MMOL/L (ref 21–32)
CREAT SERPL-MCNC: 1 MG/DL (ref 0.6–1.2)
GFR AFRICAN AMERICAN: >60
GFR NON-AFRICAN AMERICAN: 52
GLUCOSE BLD-MCNC: 105 MG/DL (ref 70–99)
POTASSIUM SERPL-SCNC: 4.1 MMOL/L (ref 3.5–5.1)
SODIUM BLD-SCNC: 141 MMOL/L (ref 136–145)

## 2021-06-11 DIAGNOSIS — I10 ESSENTIAL HYPERTENSION: ICD-10-CM

## 2021-06-11 RX ORDER — TRIAMTERENE AND HYDROCHLOROTHIAZIDE 75; 50 MG/1; MG/1
TABLET ORAL
Qty: 90 TABLET | Refills: 3 | Status: SHIPPED | OUTPATIENT
Start: 2021-06-11 | End: 2022-02-21

## 2021-06-15 ENCOUNTER — OFFICE VISIT (OUTPATIENT)
Dept: INTERNAL MEDICINE CLINIC | Age: 86
End: 2021-06-15
Payer: MEDICARE

## 2021-06-15 VITALS
HEART RATE: 52 BPM | DIASTOLIC BLOOD PRESSURE: 58 MMHG | WEIGHT: 175.4 LBS | BODY MASS INDEX: 29.19 KG/M2 | SYSTOLIC BLOOD PRESSURE: 120 MMHG | OXYGEN SATURATION: 96 %

## 2021-06-15 DIAGNOSIS — G60.9 IDIOPATHIC PERIPHERAL NEUROPATHY: ICD-10-CM

## 2021-06-15 DIAGNOSIS — R39.11 URINARY HESITANCY: ICD-10-CM

## 2021-06-15 DIAGNOSIS — I10 ESSENTIAL HYPERTENSION: Primary | ICD-10-CM

## 2021-06-15 DIAGNOSIS — M48.062 SPINAL STENOSIS OF LUMBAR REGION WITH NEUROGENIC CLAUDICATION: ICD-10-CM

## 2021-06-15 PROCEDURE — 1123F ACP DISCUSS/DSCN MKR DOCD: CPT | Performed by: INTERNAL MEDICINE

## 2021-06-15 PROCEDURE — 4040F PNEUMOC VAC/ADMIN/RCVD: CPT | Performed by: INTERNAL MEDICINE

## 2021-06-15 PROCEDURE — 99214 OFFICE O/P EST MOD 30 MIN: CPT | Performed by: INTERNAL MEDICINE

## 2021-06-15 PROCEDURE — 1036F TOBACCO NON-USER: CPT | Performed by: INTERNAL MEDICINE

## 2021-06-15 PROCEDURE — G8427 DOCREV CUR MEDS BY ELIG CLIN: HCPCS | Performed by: INTERNAL MEDICINE

## 2021-06-15 PROCEDURE — G8417 CALC BMI ABV UP PARAM F/U: HCPCS | Performed by: INTERNAL MEDICINE

## 2021-06-15 PROCEDURE — 1090F PRES/ABSN URINE INCON ASSESS: CPT | Performed by: INTERNAL MEDICINE

## 2021-06-15 RX ORDER — NETARSUDIL AND LATANOPROST OPHTHALMIC SOLUTION, 0.02%/0.005% .2; .05 MG/ML; MG/ML
1 SOLUTION/ DROPS OPHTHALMIC; TOPICAL NIGHTLY
COMMUNITY
End: 2022-01-14 | Stop reason: ALTCHOICE

## 2021-06-15 RX ORDER — DULOXETIN HYDROCHLORIDE 60 MG/1
60 CAPSULE, DELAYED RELEASE ORAL DAILY
Qty: 90 CAPSULE | Refills: 3
Start: 2021-06-15 | End: 2021-07-09 | Stop reason: SDUPTHER

## 2021-06-15 ASSESSMENT — ENCOUNTER SYMPTOMS
SHORTNESS OF BREATH: 0
CONSTIPATION: 0
CHEST TIGHTNESS: 0
DIARRHEA: 0

## 2021-06-15 NOTE — PROGRESS NOTES
Patient: Scottie Varghese is a 80 y.o. female who presents today with the following Chief Complaint(s):  Chief Complaint   Patient presents with    Check-Up       HPI     Here today for follow up. Would like to go back on Cymbalta- has noticed that she is hurting more since discontinuing it. Noticed that her pain increased when she dropped from 40 mg to 30 mg. 40 mg pills were more expensive than 60 mg. Is to have eye surgery to clean out her canal on 6/30/21. Bruise has resolved. When she had the bruise she felt she had some difficulty urinating. Is worried about her stomach- states that \"I sometimes feel like I have extra stuff there\". Denies constipation. Does have h/o rectocele and cystocele repair and rectocele repair was \"too tight\". Added a probiotic and is helping her. Does burp \"a lot\"- uses jocelyn slices which helps with her heart burn.      Allergies   Allergen Reactions    Ace Inhibitors      cough    Catapres [Clonidine Hcl]      patch    Diovan [Valsartan]      ineffective    Eszopiclone      ineffective    Morphine      All derivatives- GI upset      Past Medical History:   Diagnosis Date    Arthritis     OA    Atrial fibrillation (HCC)     Blood transfusion     1975    Carpal tunnel syndrome     mild    Dizziness     Edema     Frequent falls     Glaucoma     [place] (hard of hearing)     bilateral hearing aids    Hx of cholelithiasis     Hyperlipidemia     Hypertension     Osteoarthrosis, unspecified whether generalized or localized, unspecified site     Postmenopausal atrophic vaginitis     Unspecified disorder of autonomic nervous system     Unspecified essential hypertension     Unspecified eustachian tube disorder     Unspecified glaucoma(365.9)     Unspecified hemorrhoids without mention of complication     Urethral stricture unspecified       Past Surgical History:   Procedure Laterality Date    COLONOSCOPY      ELBOW SURGERY      left I&D    EYE SURGERY      cataracts    HEMORRHOID SURGERY      HYSTERECTOMY      1 ovary left ;later oophorectomy     KNEE CARTILAGE SURGERY      LAMINECTOMY  12    L  3-4 bilateral laminectomy discectomy      Social History     Socioeconomic History    Marital status:      Spouse name: Not on file    Number of children: Not on file    Years of education: Not on file    Highest education level: Not on file   Occupational History    Not on file   Tobacco Use    Smoking status: Never Smoker    Smokeless tobacco: Never Used   Vaping Use    Vaping Use: Never used   Substance and Sexual Activity    Alcohol use: Yes     Alcohol/week: 0.0 standard drinks     Comment: wine with meals occ    Drug use: No    Sexual activity: Not on file   Other Topics Concern    Not on file   Social History Narrative    3  siblings    7 children    3 sons live close by     Social Determinants of Health     Financial Resource Strain:     Difficulty of Paying Living Expenses:    Food Insecurity:     Worried About Running Out of Food in the Last Year:     [address] in the Last Year:    Transportation Needs:     Lack of Transportation (Medical):      Lack of Transportation (Non-Medical):    Physical Activity:     Days of Exercise per Week:     Minutes of Exercise per Session:    Stress:     Feeling of Stress :    Social Connections:     Frequency of Communication with Friends and Family:     Frequency of Social Gatherings with Friends and Family:     Attends Hoahaoism Services:     Active Member of Clubs or Organizations:     Attends Club or Organization Meetings:     Marital Status:    Intimate Partner Violence:     Fear of Current or Ex-Partner:     Emotionally Abused:     Physically Abused:     Sexually Abused:      Family History   Problem Relation Age of Onset    Diabetes Mother     High Blood Pressure Mother     Heart Disease Mother     Diabetes Brother     High Blood Pressure Brother     Heart Disease Brother     Cancer Brother     Rheum Arthritis Daughter         fibromayalgia    Cancer Brother         prostate cancer     Heart Attack Brother         Outpatient Medications Prior to Visit   Medication Sig Dispense Refill    Netarsudil-Latanoprost (ROCKLATAN) 0.02-0.005 % SOLN Place 1 drop into the left eye nightly       triamterene-hydroCHLOROthiazide (MAXZIDE) 75-50 MG per tablet TAKE 1 TABLET DAILY 90 tablet 3    pravastatin (PRAVACHOL) 10 MG tablet Take 1 tablet daily 90 tablet 3    warfarin (COUMADIN) 5 MG tablet 1 tablet daily 90 tablet 3    olmesartan (BENICAR) 20 MG tablet Take 1 tablet by mouth daily 90 tablet 3    potassium chloride (KLOR-CON SPRINKLE) 10 MEQ extended release capsule Take 1 capsule by mouth 2 times daily 180 capsule 3    amLODIPine (NORVASC) 5 MG tablet TAKE ONE TABLET BY MOUTH DAILY 90 tablet 3    diclofenac sodium (VOLTAREN) 1 % GEL Apply 4 g topically 4 times daily as needed for Pain 100 g 1    brimonidine (ALPHAGAN) 0.2 % ophthalmic solution Place 1 drop into both eyes 3 times daily       LUTEIN PO Take by mouth      dorzolamide (TRUSOPT) 2 % ophthalmic solution Place 1 drop into both eyes 3 times daily       acetaminophen (TYLENOL) 500 MG tablet Take 1,000 mg by mouth three times daily      Glucosamine-Chondroit-Vit C-Mn (GLUCOSAMINE CHONDR 500 COMPLEX PO) Take  by mouth.  MULTIPLE VITAMIN PO Take  by mouth.         DULoxetine (CYMBALTA) 30 MG extended release capsule Take 1 capsule by mouth daily 30 capsule 0    DULoxetine (CYMBALTA) 20 MG extended release capsule Take 2 capsules by mouth daily (Patient not taking: Reported on 6/15/2021) 60 capsule 1    RHOPRESSA 0.02 % SOLN Place 1 drop into both eyes daily (Patient not taking: Reported on 6/15/2021)      timolol (TIMOPTIC) 0.5 % ophthalmic solution INSTILL ONE DROP IN THE LEFT EYE DAILY (Patient not taking: Reported on 6/15/2021) 1 Bottle 0    latanoprost (XALATAN) 0.005 % ophthalmic solution Place 1 drop into the left eye nightly (Patient not taking: Reported on 6/15/2021) 1 Bottle 1    Coenzyme Q10 (COQ-10 PO) Take  by mouth. (Patient not taking: Reported on 6/15/2021)       No facility-administered medications prior to visit. Patient'spast medical history, surgical history, family history, medications,  and allergies  were all reviewed and updated as appropriate today. Review of Systems   Constitutional: Negative for appetite change, fatigue and fever. Respiratory: Negative for chest tightness and shortness of breath. Cardiovascular: Negative for chest pain. Gastrointestinal: Negative for constipation and diarrhea. Skin: Negative for rash. BP (!) 120/58   Pulse 52   Wt 175 lb 6.4 oz (79.6 kg)   SpO2 96%   BMI 29.19 kg/m²   Physical Exam  Vitals and nursing note reviewed. Constitutional:       Appearance: She is well-developed. She is not toxic-appearing. HENT:      Head: Normocephalic. Right Ear: Tympanic membrane, ear canal and external ear normal.      Left Ear: Tympanic membrane, ear canal and external ear normal.   Eyes:      General: No scleral icterus. Extraocular Movements: Extraocular movements intact. Conjunctiva/sclera: Conjunctivae normal.      Pupils: Pupils are equal, round, and reactive to light. Neck:      Thyroid: No thyroid mass or thyromegaly. Vascular: No carotid bruit. Cardiovascular:      Rate and Rhythm: Normal rate and regular rhythm. Pulses:           Dorsalis pedis pulses are 2+ on the right side and 2+ on the left side. Heart sounds: Normal heart sounds. No murmur heard. Comments: No LE edema  Pulmonary:      Effort: Pulmonary effort is normal.      Breath sounds: Normal breath sounds. Abdominal:      Palpations: Abdomen is soft. There is no mass. Tenderness: There is no abdominal tenderness. Lymphadenopathy:      Cervical: No cervical adenopathy.    Neurological:      Mental Status: She is alert. Psychiatric:         Behavior: Behavior normal. Behavior is cooperative. ASSESSMENT/PLAN:    Problem List Items Addressed This Visit     Essential hypertension - Primary     Well-controlled. Continue amlodipine 5 mg daily, Benicar 20 mg daily, and Maxide 75/50 mg daily. Idiopathic peripheral neuropathy      Noticed significant worsening of symptoms while weaning down Cymbalta. Patient noticed an increase in symptoms when she decreased her dose from 40 mg to 30 mg daily. As the 40 mg pills are expensive and the 60 mg are free, will resume Cymbalta 60 mg daily. Relevant Medications    DULoxetine (CYMBALTA) 60 MG extended release capsule    Spinal stenosis of lumbar region     Noticed significant worsening of symptoms while weaning down Cymbalta. Patient noticed an increase in symptoms when she decreased her dose from 40 mg to 30 mg daily. As the 40 mg pills are expensive and the 60 mg are free, will resume Cymbalta 60 mg daily. Urinary hesitancy      Patient with urinary hesitancy during the time that she had her leg hematoma. This is since resolved. She has also noticed \"stuff\" moving through her upper abdomen. Abdominal exam was unremarkable and suspect she may be noticing stool moving through her transverse colon.                Current Outpatient Medications   Medication Sig Dispense Refill    Netarsudil-Latanoprost (ROCKLATAN) 0.02-0.005 % SOLN Place 1 drop into the left eye nightly       DULoxetine (CYMBALTA) 60 MG extended release capsule Take 1 capsule by mouth daily 90 capsule 3    triamterene-hydroCHLOROthiazide (MAXZIDE) 75-50 MG per tablet TAKE 1 TABLET DAILY 90 tablet 3    pravastatin (PRAVACHOL) 10 MG tablet Take 1 tablet daily 90 tablet 3    warfarin (COUMADIN) 5 MG tablet 1 tablet daily 90 tablet 3    olmesartan (BENICAR) 20 MG tablet Take 1 tablet by mouth daily 90 tablet 3    potassium chloride (KLOR-CON SPRINKLE) 10 MEQ extended release capsule Take 1 capsule by mouth 2 times daily 180 capsule 3    amLODIPine (NORVASC) 5 MG tablet TAKE ONE TABLET BY MOUTH DAILY 90 tablet 3    diclofenac sodium (VOLTAREN) 1 % GEL Apply 4 g topically 4 times daily as needed for Pain 100 g 1    brimonidine (ALPHAGAN) 0.2 % ophthalmic solution Place 1 drop into both eyes 3 times daily       LUTEIN PO Take by mouth      dorzolamide (TRUSOPT) 2 % ophthalmic solution Place 1 drop into both eyes 3 times daily       acetaminophen (TYLENOL) 500 MG tablet Take 1,000 mg by mouth three times daily      Glucosamine-Chondroit-Vit C-Mn (GLUCOSAMINE CHONDR 500 COMPLEX PO) Take  by mouth.  MULTIPLE VITAMIN PO Take  by mouth.  Coenzyme Q10 (COQ-10 PO) Take  by mouth. (Patient not taking: Reported on 6/15/2021)       No current facility-administered medications for this visit. No follow-ups on file.

## 2021-06-15 NOTE — ASSESSMENT & PLAN NOTE
Patient with urinary hesitancy during the time that she had her leg hematoma. This is since resolved. She has also noticed \"stuff\" moving through her upper abdomen. Abdominal exam was unremarkable and suspect she may be noticing stool moving through her transverse colon.

## 2021-06-15 NOTE — ASSESSMENT & PLAN NOTE
Noticed significant worsening of symptoms while weaning down Cymbalta. Patient noticed an increase in symptoms when she decreased her dose from 40 mg to 30 mg daily. As the 40 mg pills are expensive and the 60 mg are free, will resume Cymbalta 60 mg daily.

## 2021-06-28 ENCOUNTER — OFFICE VISIT (OUTPATIENT)
Dept: ORTHOPEDIC SURGERY | Age: 86
End: 2021-06-28
Payer: MEDICARE

## 2021-06-28 ENCOUNTER — OFFICE VISIT (OUTPATIENT)
Dept: INTERNAL MEDICINE CLINIC | Age: 86
End: 2021-06-28
Payer: MEDICARE

## 2021-06-28 ENCOUNTER — TELEPHONE (OUTPATIENT)
Dept: INTERNAL MEDICINE CLINIC | Age: 86
End: 2021-06-28

## 2021-06-28 VITALS
SYSTOLIC BLOOD PRESSURE: 136 MMHG | HEART RATE: 66 BPM | OXYGEN SATURATION: 97 % | BODY MASS INDEX: 29.12 KG/M2 | WEIGHT: 175 LBS | DIASTOLIC BLOOD PRESSURE: 82 MMHG

## 2021-06-28 VITALS — WEIGHT: 175 LBS | HEIGHT: 65 IN | BODY MASS INDEX: 29.16 KG/M2

## 2021-06-28 DIAGNOSIS — S60.211A CONTUSION OF RIGHT WRIST, INITIAL ENCOUNTER: Primary | ICD-10-CM

## 2021-06-28 DIAGNOSIS — S49.91XA RIGHT SHOULDER INJURY, INITIAL ENCOUNTER: ICD-10-CM

## 2021-06-28 DIAGNOSIS — S69.91XA INJURY OF RIGHT WRIST, INITIAL ENCOUNTER: ICD-10-CM

## 2021-06-28 DIAGNOSIS — Z91.81 AT HIGH RISK FOR FALLS: ICD-10-CM

## 2021-06-28 DIAGNOSIS — S69.91XA RIGHT WRIST INJURY, INITIAL ENCOUNTER: ICD-10-CM

## 2021-06-28 DIAGNOSIS — S60.221A CONTUSION OF RIGHT HAND, INITIAL ENCOUNTER: ICD-10-CM
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
## 2021-06-28 PROCEDURE — 1090F PRES/ABSN URINE INCON ASSESS: CPT | Performed by: PHYSICIAN ASSISTANT

## 2021-06-28 PROCEDURE — G8427 DOCREV CUR MEDS BY ELIG CLIN: HCPCS | Performed by: PHYSICIAN ASSISTANT

## 2021-06-28 PROCEDURE — L3908 WHO COCK-UP NONMOLDE PRE OTS: HCPCS | Performed by: PHYSICIAN ASSISTANT

## 2021-06-28 PROCEDURE — 1123F ACP DISCUSS/DSCN MKR DOCD: CPT | Performed by: PHYSICIAN ASSISTANT

## 2021-06-28 PROCEDURE — 1123F ACP DISCUSS/DSCN MKR DOCD: CPT | Performed by: NURSE PRACTITIONER

## 2021-06-28 PROCEDURE — 4040F PNEUMOC VAC/ADMIN/RCVD: CPT | Performed by: PHYSICIAN ASSISTANT

## 2021-06-28 PROCEDURE — 1090F PRES/ABSN URINE INCON ASSESS: CPT | Performed by: NURSE PRACTITIONER

## 2021-06-28 PROCEDURE — G8427 DOCREV CUR MEDS BY ELIG CLIN: HCPCS | Performed by: NURSE PRACTITIONER

## 2021-06-28 PROCEDURE — 4040F PNEUMOC VAC/ADMIN/RCVD: CPT | Performed by: NURSE PRACTITIONER

## 2021-06-28 PROCEDURE — G8417 CALC BMI ABV UP PARAM F/U: HCPCS | Performed by: PHYSICIAN ASSISTANT

## 2021-06-28 PROCEDURE — G8417 CALC BMI ABV UP PARAM F/U: HCPCS | Performed by: NURSE PRACTITIONER

## 2021-06-28 PROCEDURE — 99214 OFFICE O/P EST MOD 30 MIN: CPT | Performed by: NURSE PRACTITIONER

## 2021-06-28 PROCEDURE — 99203 OFFICE O/P NEW LOW 30 MIN: CPT | Performed by: PHYSICIAN ASSISTANT

## 2021-06-28 PROCEDURE — 1036F TOBACCO NON-USER: CPT | Performed by: NURSE PRACTITIONER

## 2021-06-28 PROCEDURE — 1036F TOBACCO NON-USER: CPT | Performed by: PHYSICIAN ASSISTANT

## 2021-06-28 SDOH — ECONOMIC STABILITY: FOOD INSECURITY: WITHIN THE PAST 12 MONTHS, YOU WORRIED THAT YOUR FOOD WOULD RUN OUT BEFORE YOU GOT MONEY TO BUY MORE.: NEVER TRUE

## 2021-06-28 SDOH — ECONOMIC STABILITY: FOOD INSECURITY: WITHIN THE PAST 12 MONTHS, THE FOOD YOU BOUGHT JUST DIDN'T LAST AND YOU DIDN'T HAVE MONEY TO GET MORE.: NEVER TRUE

## 2021-06-28 ASSESSMENT — ENCOUNTER SYMPTOMS
COUGH: 0
SHORTNESS OF BREATH: 0
CHEST TIGHTNESS: 0
WHEEZING: 0

## 2021-06-28 ASSESSMENT — SOCIAL DETERMINANTS OF HEALTH (SDOH): HOW HARD IS IT FOR YOU TO PAY FOR THE VERY BASICS LIKE FOOD, HOUSING, MEDICAL CARE, AND HEATING?: NOT HARD AT ALL

## 2021-06-28 NOTE — TELEPHONE ENCOUNTER
Is there anyone that can see her at a time that she can be seen? Likely just bruised it but if she has point tenderness she may need an x-ray.

## 2021-06-28 NOTE — PROGRESS NOTES
Date of Encounter: 6/28/2021  Patient Name:Kailyn Johnson  Medical Record Number: [id number]  YOB: 1927    Chief Complaint   Patient presents with    Pain     Right wrist       History of Present Illness:  Enma Restrepo is a 80 y.o. female who presents to the [address] [place] [address] for evaluation of her right wrist.  Her pain assessment is documented below and I reviewed this with her today. Patient states she lost her balance and fell yesterday. She fell to her right side and hit the top of her right hand on the ground. She has moderate swelling and bruising today. She is anticoagulated on Coumadin. She denies injury of the right elbow.      Pain Assessment  Location of Pain: Wrist  Location Modifiers: Right  Severity of Pain: 5  Quality of Pain: Aching  Duration of Pain: Persistent  Frequency of Pain: Constant  Date Pain First Started: 06/27/21  Result of Injury: Yes  Work-Related Injury: No  Are there other pain locations you wish to document?: No    Past Medical History:   Diagnosis Date    Arthritis     OA    Atrial fibrillation (HCC)     Blood transfusion     1975    Carpal tunnel syndrome     mild    Dizziness     Edema     Frequent falls     Glaucoma     [place] (hard of hearing)     bilateral hearing aids    Hx of cholelithiasis     Hyperlipidemia     Hypertension     Osteoarthrosis, unspecified whether generalized or localized, unspecified site     Postmenopausal atrophic vaginitis     Unspecified disorder of autonomic nervous system     Unspecified essential hypertension     Unspecified eustachian tube disorder     Unspecified glaucoma(365.9)     Unspecified hemorrhoids without mention of complication     Urethral stricture unspecified        Past Surgical History:   Procedure Laterality Date    COLONOSCOPY      ELBOW SURGERY      left I&D    EYE SURGERY      cataracts    HEMORRHOID SURGERY      HYSTERECTOMY      1 ovary left ;later oophorectomy     KNEE CARTILAGE SURGERY      LAMINECTOMY  1/19/12    L  3-4 bilateral laminectomy discectomy       Current Outpatient Medications   Medication Sig Dispense Refill    Netarsudil-Latanoprost (ROCKLATAN) 0.02-0.005 % SOLN Place 1 drop into the left eye nightly       DULoxetine (CYMBALTA) 60 MG extended release capsule Take 1 capsule by mouth daily 90 capsule 3    triamterene-hydroCHLOROthiazide (MAXZIDE) 75-50 MG per tablet TAKE 1 TABLET DAILY 90 tablet 3    pravastatin (PRAVACHOL) 10 MG tablet Take 1 tablet daily 90 tablet 3    warfarin (COUMADIN) 5 MG tablet 1 tablet daily 90 tablet 3    olmesartan (BENICAR) 20 MG tablet Take 1 tablet by mouth daily 90 tablet 3    potassium chloride (KLOR-CON SPRINKLE) 10 MEQ extended release capsule Take 1 capsule by mouth 2 times daily 180 capsule 3    amLODIPine (NORVASC) 5 MG tablet TAKE ONE TABLET BY MOUTH DAILY 90 tablet 3    diclofenac sodium (VOLTAREN) 1 % GEL Apply 4 g topically 4 times daily as needed for Pain 100 g 1    brimonidine (ALPHAGAN) 0.2 % ophthalmic solution Place 1 drop into both eyes 3 times daily       LUTEIN PO Take by mouth      dorzolamide (TRUSOPT) 2 % ophthalmic solution Place 1 drop into both eyes 3 times daily       acetaminophen (TYLENOL) 500 MG tablet Take 1,000 mg by mouth three times daily      Glucosamine-Chondroit-Vit C-Mn (GLUCOSAMINE CHONDR 500 COMPLEX PO) Take  by mouth.  MULTIPLE VITAMIN PO Take  by mouth. No current facility-administered medications for this visit. Allergies, social and family histories were reviewed and updated as appropriate. Review of Systems:  Relevant review of systems reviewed and available in the patient's chart under the 'MEDIA' tab. Vital Signs:  Ht 5' 5\" (1.651 m)   Wt 175 lb (79.4 kg)   BMI 29.12 kg/m²     General Exam:   Constitutional: Patient is adequately groomed with no evidence of malnutrition  Mental Status: The patient is oriented to time, place and person.   The patient's mood and affect are appropriate. Lymphatic: The lymphatic examination bilaterally reveals all areas to be without enlargement or induration. Neurological: The patient has good coordination and balance. There is no focal weakness or sensory deficit. Right Wrist Examination:    Inspection: Patient has moderate swelling and ecchymosis on the dorsal aspect of her right wrist and hand. There is no obvious deformity. There are no abrasions or lacerations. There is no erythema, induration or warmth to suggest an infectious process. Palpation: Patient has very mild generalized tenderness on palpation along the dorsal aspect of her right wrist and hand. Range of Motion:   Flexion: 50°   Extension: 40°   Ulnar deviation: 15°    Radial deviation: 10°    Strength:  4-/5    Skin: There are no rashes, ulcerations or lesions. Sensation to light touch intact. Circulation: The limb is warm and well perfused. Distal pulses intact. Capillary refill is intact. Edema: none. Venous stasis changes: none. Radiology:     X-rays obtained and reviewed in office:  Views: 3 view right wrist including PA, lateral and oblique  Impression: No acute fracture or dislocation. There is no appreciable soft tissue swelling or joint effusion. There are no lytic or blastic lesions. There are advanced degenerative changes of the right wrist and hand. Impression:  Encounter Diagnoses   Name Primary?  Contusion of right wrist, initial encounter Yes    Contusion of right hand, initial encounter     Injury of right wrist, initial encounter        Office Procedures:  Orders Placed This Encounter   Procedures    XR WRIST RIGHT (MIN 3 VIEWS)     Standing Status:   Future     Number of Occurrences:   1     Standing Expiration Date:   7/28/2021    [employer] Titan Wrist Long Forearm Brace     Patient was prescribed a [employer] Titan Wrist and Forearm Brace.    The right wrist will require stabilization / immobilization from this semi-rigid / rigid orthosis to improve their function. The orthosis will assist in protecting the affected area, provide functional support and facilitate healing. The patient was educated and fit by a healthcare professional with expert knowledge and specialization in brace application while under the direct supervision of the treating physician. Verbal and written instructions for the use of and application of this item were provided. They were instructed to contact the office immediately should the brace result in increased pain, decreased sensation, increased swelling or worsening of the condition. Treatment Plan:          Patient sustained a contusion to the dorsal aspect of her right wrist and hand when she fell to the ground. She is anticoagulated on Coumadin. X-rays show no obvious fracture. There are advanced degenerative changes. She will be immobilized in a volar Velcro brace. She will continue resting, icing and elevating the right upper extremity. She will be given follow-up with orthopedic hand. Ashley Vaca was informed of the results of any imaging. We discussed treatment options and a time was given to answer questions. A plan was proposed and Ashley Vaca understand and accepts this course of care. Electronically signed by Evans Church PA-C on 4/60/6748  Board Certified [facility]    Please note that portions of this note were completed with a voice recognition program.  Efforts were made to edit the dictations but occasionally words are mis-transcribed.

## 2021-06-28 NOTE — TELEPHONE ENCOUNTER
Patient called stating she feel hard yesterday and has swelling and pain in her right hand. Patient states she can move fingers and grab things as well as put her eye drops in. Patient states she is icing it. Patient is asking if you would like to see her, or send xray order for it. Please advise.

## 2021-06-28 NOTE — PROGRESS NOTES
6/28/21     Chief Complaint   Patient presents with    Fall     Right hand pain from fall yesterday      HPI    Pt had another fall yesterday, 3 falls in the past year. Was fixing dinner when she thought her walker was closer to her than it was.    Has a walker with seat at home   Winferd Hatchet and landed on right hand and shoulder   Today has bruising and swelling to right hand, pain to right hand and right shoulder    Using ice   Able to move everything   Did not go to the ED   Denies hitting her head yesterday, has in the past.     Allergies   Allergen Reactions    Ace Inhibitors      cough    Catapres [Clonidine Hcl]      patch    Diovan [Valsartan]      ineffective    Eszopiclone      ineffective    Morphine      All derivatives- GI upset       Current Outpatient Medications   Medication Sig Dispense Refill    Netarsudil-Latanoprost (ROCKLATAN) 0.02-0.005 % SOLN Place 1 drop into the left eye nightly       DULoxetine (CYMBALTA) 60 MG extended release capsule Take 1 capsule by mouth daily 90 capsule 3    triamterene-hydroCHLOROthiazide (MAXZIDE) 75-50 MG per tablet TAKE 1 TABLET DAILY 90 tablet 3    pravastatin (PRAVACHOL) 10 MG tablet Take 1 tablet daily 90 tablet 3    warfarin (COUMADIN) 5 MG tablet 1 tablet daily 90 tablet 3    olmesartan (BENICAR) 20 MG tablet Take 1 tablet by mouth daily 90 tablet 3    potassium chloride (KLOR-CON SPRINKLE) 10 MEQ extended release capsule Take 1 capsule by mouth 2 times daily 180 capsule 3    amLODIPine (NORVASC) 5 MG tablet TAKE ONE TABLET BY MOUTH DAILY 90 tablet 3    diclofenac sodium (VOLTAREN) 1 % GEL Apply 4 g topically 4 times daily as needed for Pain 100 g 1    brimonidine (ALPHAGAN) 0.2 % ophthalmic solution Place 1 drop into both eyes 3 times daily       LUTEIN PO Take by mouth      dorzolamide (TRUSOPT) 2 % ophthalmic solution Place 1 drop into both eyes 3 times daily       acetaminophen (TYLENOL) 500 MG tablet Take 1,000 mg by mouth three times daily  Glucosamine-Chondroit-Vit C-Mn (GLUCOSAMINE CHONDR 500 COMPLEX PO) Take  by mouth.  MULTIPLE VITAMIN PO Take  by mouth. No current facility-administered medications for this visit. Review of Systems   Constitutional: Negative for chills, fatigue and fever. Respiratory: Negative for cough, chest tightness, shortness of breath and wheezing. Cardiovascular: Negative for chest pain, palpitations and leg swelling. Musculoskeletal: Positive for arthralgias, gait problem and myalgias. Neurological: Negative for dizziness, tremors, light-headedness and headaches. Vitals:    06/28/21 1600   BP: 136/82   Pulse: 66   SpO2: 97%   Weight: 175 lb (79.4 kg)      Physical Exam  Constitutional:       General: She is not in acute distress. Appearance: Normal appearance. HENT:      Head: Normocephalic and atraumatic. Ears:      Comments: [place]  Pulmonary:      Effort: Pulmonary effort is normal. No respiratory distress. Musculoskeletal:      Right shoulder: Tenderness present. No swelling or deformity. Decreased range of motion. Right wrist: Swelling and tenderness present. Decreased range of motion. Normal pulse. Right hand: Swelling and tenderness present. Decreased range of motion. Comments: ecchymosis and swelling to right hand, wrist   Ecchymosis to right elbow without swelling or tenderness    Neurological:      General: No focal deficit present. Mental Status: She is alert.       Comments: Using walker with wheels, slow but steady   Psychiatric:         Mood and Affect: Mood normal.         Behavior: Behavior normal.       Assessment/Plan:  Fall, initial encounter/  At high risk for falls/ Right wrist injury, initial encounter/ Right shoulder injury, initial encounter  Uncontrolled   3 falls in the past year  Sending to after hours ortho clinic today for imaging and work up  Continue with ice and tylenol   Discussed fall prevention PT - declined today Discussed medications with patient, who voiced understanding of their use and indications. All questions answered.     F/u with pcp when due     Electronically signed by AVA Adkins CNP on 6/28/2021 at 4:39 PM

## 2021-07-01 ENCOUNTER — APPOINTMENT (OUTPATIENT)
Dept: CT IMAGING | Age: 86
End: 2021-07-01
Payer: MEDICARE

## 2021-07-01 ENCOUNTER — APPOINTMENT (OUTPATIENT)
Dept: GENERAL RADIOLOGY | Age: 86
End: 2021-07-01
Payer: MEDICARE

## 2021-07-01 ENCOUNTER — HOSPITAL ENCOUNTER (EMERGENCY)
Age: 86
Discharge: HOME | End: 2021-07-01
Attending: STUDENT IN AN ORGANIZED HEALTH CARE EDUCATION/TRAINING PROGRAM
Payer: MEDICARE

## 2021-07-01 VITALS
HEIGHT: 65 IN | DIASTOLIC BLOOD PRESSURE: 60 MMHG | BODY MASS INDEX: 29.16 KG/M2 | HEART RATE: 85 BPM | WEIGHT: 175 LBS | RESPIRATION RATE: 16 BRPM | SYSTOLIC BLOOD PRESSURE: 157 MMHG | TEMPERATURE: 98 F | OXYGEN SATURATION: 96 %

## 2021-07-01 DIAGNOSIS — S40.021S ARM BRUISE, RIGHT, SEQUELA: ICD-10-CM

## 2021-07-01 DIAGNOSIS — R79.1 SUPRATHERAPEUTIC INR: ICD-10-CM

## 2021-07-01 DIAGNOSIS — R00.1 BRADYCARDIA: Primary | ICD-10-CM

## 2021-07-01 LAB
A/G RATIO: 1.1 (ref 1.1–2.2)
ALBUMIN SERPL-MCNC: 3.9 G/DL (ref 3.4–5)
ALP BLD-CCNC: 80 U/L (ref 40–129)
ALT SERPL-CCNC: 13 U/L (ref 10–40)
ANION GAP SERPL CALCULATED.3IONS-SCNC: 10 MMOL/L (ref 3–16)
AST SERPL-CCNC: 19 U/L (ref 15–37)
BASOPHILS ABSOLUTE: 0.1 K/UL (ref 0–0.2)
BASOPHILS RELATIVE PERCENT: 0.7 %
BILIRUB SERPL-MCNC: 0.4 MG/DL (ref 0–1)
BILIRUBIN URINE: NEGATIVE
BLOOD, URINE: NEGATIVE
BUN BLDV-MCNC: 32 MG/DL (ref 7–20)
CALCIUM SERPL-MCNC: 9.6 MG/DL (ref 8.3–10.6)
CHLORIDE BLD-SCNC: 105 MMOL/L (ref 99–110)
CLARITY: ABNORMAL
CO2: 24 MMOL/L (ref 21–32)
COLOR: YELLOW
CREAT SERPL-MCNC: 0.9 MG/DL (ref 0.6–1.2)
EOSINOPHILS ABSOLUTE: 0.4 K/UL (ref 0–0.6)
EOSINOPHILS RELATIVE PERCENT: 4.7 %
EPITHELIAL CELLS, UA: 2 /HPF (ref 0–5)
GFR AFRICAN AMERICAN: >60
GFR NON-AFRICAN AMERICAN: 58
GLOBULIN: 3.4 G/DL
GLUCOSE BLD-MCNC: 95 MG/DL (ref 70–99)
GLUCOSE URINE: NEGATIVE MG/DL
HCT VFR BLD CALC: 37.7 % (ref 36–48)
HEMOGLOBIN: 12.6 G/DL (ref 12–16)
HYALINE CASTS: 1 /LPF (ref 0–8)
INR BLD: 3.52 (ref 0.88–1.12)
KETONES, URINE: NEGATIVE MG/DL
LEUKOCYTE ESTERASE, URINE: ABNORMAL
LYMPHOCYTES ABSOLUTE: 2.8 K/UL (ref 1–5.1)
LYMPHOCYTES RELATIVE PERCENT: 30.7 %
MCH RBC QN AUTO: 31.5 PG (ref 26–34)
MCHC RBC AUTO-ENTMCNC: 33.3 G/DL (ref 31–36)
MCV RBC AUTO: 94.7 FL (ref 80–100)
MICROSCOPIC EXAMINATION: YES
MONOCYTES ABSOLUTE: 0.7 K/UL (ref 0–1.3)
MONOCYTES RELATIVE PERCENT: 7.9 %
NEUTROPHILS ABSOLUTE: 5.1 K/UL (ref 1.7–7.7)
NEUTROPHILS RELATIVE PERCENT: 56 %
NITRITE, URINE: NEGATIVE
PDW BLD-RTO: 14.4 % (ref 12.4–15.4)
PH UA: 7.5 (ref 5–8)
PLATELET # BLD: 239 K/UL (ref 135–450)
PMV BLD AUTO: 8.9 FL (ref 5–10.5)
POTASSIUM REFLEX MAGNESIUM: 3.7 MMOL/L (ref 3.5–5.1)
PRO-BNP: 1028 PG/ML (ref 0–449)
PROTEIN UA: NEGATIVE MG/DL
PROTHROMBIN TIME: 41.9 SEC (ref 9.9–12.7)
RBC # BLD: 3.98 M/UL (ref 4–5.2)
RBC UA: ABNORMAL /HPF (ref 0–4)
SODIUM BLD-SCNC: 139 MMOL/L (ref 136–145)
SPECIFIC GRAVITY UA: 1.02 (ref 1–1.03)
TOTAL PROTEIN: 7.3 G/DL (ref 6.4–8.2)
TROPONIN: <0.01 NG/ML
URINE REFLEX TO CULTURE: ABNORMAL
URINE TYPE: ABNORMAL
UROBILINOGEN, URINE: 1 E.U./DL
WBC # BLD: 9.1 K/UL (ref 4–11)
WBC UA: 6 /HPF (ref 0–5)

## 2021-07-01 PROCEDURE — 93005 ELECTROCARDIOGRAM TRACING: CPT | Performed by: PHYSICIAN ASSISTANT

## 2021-07-01 PROCEDURE — 81001 URINALYSIS AUTO W/SCOPE: CPT

## 2021-07-01 PROCEDURE — 99283 EMERGENCY DEPT VISIT LOW MDM: CPT

## 2021-07-01 PROCEDURE — 85025 COMPLETE CBC W/AUTO DIFF WBC: CPT

## 2021-07-01 PROCEDURE — 84484 ASSAY OF TROPONIN QUANT: CPT

## 2021-07-01 PROCEDURE — 80053 COMPREHEN METABOLIC PANEL: CPT

## 2021-07-01 PROCEDURE — 70450 CT HEAD/BRAIN W/O DYE: CPT

## 2021-07-01 PROCEDURE — 71045 X-RAY EXAM CHEST 1 VIEW: CPT

## 2021-07-01 PROCEDURE — 83880 ASSAY OF NATRIURETIC PEPTIDE: CPT

## 2021-07-01 PROCEDURE — 85610 PROTHROMBIN TIME: CPT

## 2021-07-01 ASSESSMENT — ENCOUNTER SYMPTOMS
ABDOMINAL PAIN: 0
PHOTOPHOBIA: 0
SHORTNESS OF BREATH: 0
NAUSEA: 0
CONSTIPATION: 0
BACK PAIN: 0
DIARRHEA: 0
RESPIRATORY NEGATIVE: 1
VOMITING: 0
COLOR CHANGE: 1
COUGH: 0

## 2021-07-01 NOTE — ED PROVIDER NOTES
[address] ENCOUNTER        Pt Name: Leonardo Green  MRN: [id number]  [place] 7/26/1927  Date of evaluation: 7/1/2021  Provider: DIAMOND Hein  PCP: Lupe Ames DO  Note Started: 6:03 PM EDT        I have seen and evaluated this patient with my supervising physician Allen Gallagher MD.    [address] [place] [address]       Chief Complaint   Patient presents with    Irregular Heart Beat     Patient states her pulse ox at home was reading her HR in 35s. HR 80s during triage. HISTORY OF PRESENT ILLNESS   (Location, Timing/Onset, Context/Setting, Quality, Duration, Modifying Factors, Severity, Associated Signs and Symptoms)  Note limiting factors. Chief Complaint: Marlene Green is a 80 y.o. female With past medical show atrial fibrillation on warfarin, frequent falls, hyperlipidemia, hypertension, who presents to the ED with complaint of possible irregular heartbeat and jitteriness. Patient states she fell several days ago. States she injured her right wrist.  Patient states she has had some bruising to the area. States she was seen at the outpatient Ortho clinic and had x-rays which were unremarkable. Patient is ever since then she has had feelings of jitteriness and jumpiness. Patient states she felt like her heart was potentially beating irregular. States she checked her pulse ox at home and it was reading heart rate in the 30s. Patient came to the ED for further evaluation and treatment. Heart rate 80s upon arrival.  Patient denies any chest pain or shortness of breath. Denies any lightheadedness or dizziness. Denies any headache, visual changes or speech disturbances. Denies any numbness or tingling. Denies any head injury with the fall a couple days ago. Denies any neck pain. Denies abdominal pain, nausea/vomiting, urinary symptoms or changes in bowel movements. Denies any medication changes.       Nursing Notes were all reviewed and agreed with or any disagreements were addressed in the HPI. REVIEW OF SYSTEMS    (2-9 systems for level 4, 10 or more for level 5)     Review of Systems   Constitutional: Negative for activity change, appetite change, chills, diaphoresis, fatigue and fever. Eyes: Negative for photophobia and visual disturbance. Respiratory: Negative. Negative for cough and shortness of breath. Cardiovascular: Positive for palpitations. Negative for chest pain and leg swelling. Gastrointestinal: Negative for abdominal pain, constipation, diarrhea, nausea and vomiting. Genitourinary: Negative for decreased urine volume, difficulty urinating, dysuria, flank pain, frequency, hematuria and urgency. Musculoskeletal: Negative for arthralgias, back pain, myalgias, neck pain and neck stiffness. Skin: Positive for color change. Negative for pallor, rash and wound. Neurological: Negative for dizziness, tremors, seizures, syncope, facial asymmetry, speech difficulty, weakness, light-headedness, numbness and headaches. Positives and Pertinent negatives as per HPI. Except as noted above in the ROS, all other systems were reviewed and negative.        PAST MEDICAL HISTORY     Past Medical History:   Diagnosis Date    Arthritis     OA    Atrial fibrillation ([facility] [address]     Blood transfusion     1975    Carpal tunnel syndrome     mild    Dizziness     Edema     Frequent falls     Glaucoma     "[employer] (hard of hearing)     bilateral hearing aids    Hx of cholelithiasis     Hyperlipidemia     Hypertension     Osteoarthrosis, unspecified whether generalized or localized, unspecified site     Postmenopausal atrophic vaginitis     Unspecified disorder of autonomic nervous system     Unspecified essential hypertension     Unspecified eustachian tube disorder     Unspecified glaucoma(365.9)     Unspecified hemorrhoids without mention of complication     Urethral stricture unspecified SURGICAL HISTORY     Past Surgical History:   Procedure Laterality Date    COLONOSCOPY      ELBOW SURGERY      left I&D    EYE SURGERY      cataracts    HEMORRHOID SURGERY      HYSTERECTOMY      1 ovary left ;later oophorectomy     KNEE CARTILAGE SURGERY      LAMINECTOMY  1/19/12    L  3-4 bilateral laminectomy discectomy         CURRENTMEDICATIONS       Previous Medications    ACETAMINOPHEN (TYLENOL) 500 MG TABLET    Take 1,000 mg by mouth three times daily    AMLODIPINE (NORVASC) 5 MG TABLET    TAKE ONE TABLET BY MOUTH DAILY    BRIMONIDINE (ALPHAGAN) 0.2 % OPHTHALMIC SOLUTION    Place 1 drop into both eyes 3 times daily     DICLOFENAC SODIUM (VOLTAREN) 1 % GEL    Apply 4 g topically 4 times daily as needed for Pain    DORZOLAMIDE (TRUSOPT) 2 % OPHTHALMIC SOLUTION    Place 1 drop into both eyes 3 times daily     DULOXETINE (CYMBALTA) 60 MG EXTENDED RELEASE CAPSULE    Take 1 capsule by mouth daily    GLUCOSAMINE-CHONDROIT-VIT C-MN (GLUCOSAMINE CHONDR 500 COMPLEX PO)    Take  by mouth. LUTEIN PO    Take by mouth    MULTIPLE VITAMIN PO    Take  by mouth.       NETARSUDIL-LATANOPROST (ROCKLATAN) 0.02-0.005 % SOLN    Place 1 drop into the left eye nightly     OLMESARTAN (BENICAR) 20 MG TABLET    Take 1 tablet by mouth daily    POTASSIUM CHLORIDE (KLOR-CON SPRINKLE) 10 MEQ EXTENDED RELEASE CAPSULE    Take 1 capsule by mouth 2 times daily    PRAVASTATIN (PRAVACHOL) 10 MG TABLET    Take 1 tablet daily    TRIAMTERENE-HYDROCHLOROTHIAZIDE (MAXZIDE) 75-50 MG PER TABLET    TAKE 1 TABLET DAILY    WARFARIN (COUMADIN) 5 MG TABLET    1 tablet daily         ALLERGIES     Ace inhibitors, Catapres [clonidine hcl], Diovan [valsartan], Eszopiclone, and Morphine    FAMILYHISTORY       Family History   Problem Relation Age of Onset    Diabetes Mother     High Blood Pressure Mother     Heart Disease Mother     Diabetes Brother     High Blood Pressure Brother     Heart Disease Brother     Cancer Brother     Rheum Arthritis Daughter         fibromayalgia    Cancer Brother         prostate cancer     Heart Attack Brother           SOCIAL HISTORY       Social History     Tobacco Use    Smoking status: Never Smoker    Smokeless tobacco: Never Used   Vaping Use    Vaping Use: Never used   Substance Use Topics    Alcohol use: Yes     Alcohol/week: 0.0 standard drinks     Comment: wine with meals occ    Drug use: No       SCREENINGS             PHYSICAL EXAM    (up to 7 for level 4, 8 or more for level 5)     ED Triage Vitals [07/01/21 1447]   BP Temp Temp Source Pulse Resp SpO2 Height Weight   (!) 157/60 98 °F (36.7 °C) Oral 85 16 96 % 5' 5\" (1.651 m) 175 lb (79.4 kg)       Physical Exam  Constitutional:       General: She is not in acute distress. Appearance: Normal appearance. She is well-developed. She is not ill-appearing, toxic-appearing or diaphoretic. HENT:      Head: Normocephalic and atraumatic. Comments: Atraumatic. No raccoon eyes or larsen sign. Right Ear: External ear normal.      Left Ear: External ear normal.   Eyes:      General:         Right eye: No discharge. Left eye: No discharge. Extraocular Movements: Extraocular movements intact. Conjunctiva/sclera: Conjunctivae normal.      Pupils: Pupils are equal, round, and reactive to light. Cardiovascular:      Rate and Rhythm: Normal rate and regular rhythm. Pulses: Normal pulses. Heart sounds: Normal heart sounds. No murmur heard. No friction rub. No gallop. Comments: 2+ radial pulses bilaterally. No pedal edema. No calf tenderness. No JVD. Pulmonary:      Effort: Pulmonary effort is normal. No respiratory distress. Breath sounds: Normal breath sounds. No stridor. No wheezing, rhonchi or rales. Chest:      Chest wall: No tenderness. Abdominal:      General: Abdomen is flat. Bowel sounds are normal. There is no distension. Palpations: Abdomen is soft. There is no mass.       Tenderness: There is no abdominal tenderness. There is no right CVA tenderness, left CVA tenderness, guarding or rebound. Hernia: No hernia is present. Musculoskeletal:         General: Normal range of motion. Cervical back: Normal range of motion and neck supple. Comments: Bruising to the right forearm noted. There is no bony tenderness throughout the right shoulder, right elbow, right wrist or right hand. No anatomical snuffbox tenderness. Radial pulse and capillary refill brisk. Sensation intact to the radial ulnar aspects distal fingertips. Full range of motion strength in distal median, ulnar radial nerve distribution. Full range of motion strength throughout the right upper extremity. Compartments are soft. There is no edema, erythema or warmth. No abrasion or laceration. No rashes or lesions. No other tender palpation to the upper and lower extremities bilaterally. There is no anterior chest wall tenderness. No pelvis instability. No TTP to the midline cervical, thoracic or lumbar spine. Skin:     General: Skin is warm and dry. Coloration: Skin is not pale. Findings: No erythema or rash. Neurological:      General: No focal deficit present. Mental Status: She is alert and oriented to person, place, and time. GCS: GCS eye subscore is 4. GCS verbal subscore is 5. GCS motor subscore is 6. Cranial Nerves: Cranial nerves are intact. No cranial nerve deficit, dysarthria or facial asymmetry. Sensory: Sensation is intact. No sensory deficit. Motor: Motor function is intact. Coordination: Coordination is intact. Gait: Gait is intact.  Gait normal.   Psychiatric:         Behavior: Behavior normal.         DIAGNOSTIC RESULTS   LABS:    Labs Reviewed   PROTIME-INR - Abnormal; Notable for the following components:       Result Value    Protime 41.9 (*)     INR 3.52 (*)     All other components within normal limits    Narrative:     Performed at:  [facility]  [address], [address]   Phone [phone]   CBC WITH AUTO DIFFERENTIAL - Abnormal; Notable for the following components:    RBC 3.98 (*)     All other components within normal limits    Narrative:     Performed at:  [facility]  [address], [address]   Phone [phone]   COMPREHENSIVE METABOLIC PANEL W/ REFLEX TO MG FOR LOW K - Abnormal; Notable for the following components:    BUN 32 (*)     GFR Non- 58 (*)     All other components within normal limits    Narrative:     Performed at:  [facility] [address], [employer]   Phone 21  - Abnormal; Notable for the following components:    Pro-BNP 1,028 (*)     All other components within normal limits    Narrative:     Performed at:  [facility] [address], [employer]   Phone [phone]   URINE RT REFLEX TO CULTURE - Abnormal; Notable for the following components:    Clarity, UA CLOUDY (*)     Leukocyte Esterase, Urine SMALL (*)     All other components within normal limits    Narrative:     Performed at:  [facility] [address], [employer]   Phone [phone]   TROPONIN    Narrative:     Performed at:  [facility] [address], [employer]   Phone [phone]   [address] [place] [address]       When ordered only abnormal lab results are displayed. All other labs were within normal range or not returned as of this dictation. EKG: When ordered, EKG's are interpreted by the Emergency Department Physician in the absence of a cardiologist.  Please see their note for interpretation of EKG.     RADIOLOGY:   Non-plain film images such as CT, Ultrasound and MRI are read by the radiologist. Plain radiographic images are visualized and preliminarily interpreted by the ED Provider with the below findings:        Interpretation per the Radiologist below, if available at the time of this note:    CT HEAD WO CONTRAST   Final Result   No acute intracranial abnormality. XR CHEST PORTABLE   Final Result   1. Cardiomegaly with no evidence of pulmonary edema   2. Left basilar atelectasis or pneumonia           XR WRIST RIGHT (MIN 3 VIEWS)    Result Date: 6/28/2021  Radiology exam is complete. No Radiologist dictation. Please follow up with ordering provider. CT HEAD WO CONTRAST    Result Date: 7/1/2021  EXAMINATION: CT OF THE HEAD WITHOUT CONTRAST  7/1/2021 3:40 pm TECHNIQUE: CT of the head was performed without the administration of intravenous contrast. Dose modulation, iterative reconstruction, and/or weight based adjustment of the mA/kV was utilized to reduce the radiation dose to as low as reasonably achievable. COMPARISON: 02/08/2018 HISTORY: ORDERING SYSTEM PROVIDED HISTORY: fall TECHNOLOGIST PROVIDED HISTORY: Has a \"code stroke\" or \"stroke alert\" been called? ->No Reason for exam:->fall Decision Support Exception - unselect if not a suspected or confirmed emergency medical condition->Emergency Medical Condition (MA) Reason for Exam: fall Acuity: Unknown Type of Exam: Unknown FINDINGS: BRAIN/VENTRICLES: There is no acute intracranial hemorrhage, mass effect or midline shift. No abnormal extra-axial fluid collection. The gray-white differentiation is maintained without evidence of an acute infarct. There is no evidence of hydrocephalus. There is a mild degree of generalized volume loss. Scattered low-attenuation is in the periventricular white matter, not significantly changed, consistent with small vessel disease. ORBITS: The visualized portion of the orbits demonstrate no acute abnormality. SINUSES: The visualized paranasal sinuses and mastoid air cells demonstrate no acute abnormality.  SOFT TISSUES/SKULL:  No acute disposition and treatment of patient. FINAL IMPRESSION    No diagnosis found. DISPOSITION/PLAN   DISPOSITION        PATIENT REFERRED TO:  No follow-up provider specified.     DISCHARGE MEDICATIONS:  New Prescriptions    No medications on file       DISCONTINUED MEDICATIONS:  Discontinued Medications    No medications on file              (Please note that portions of this note were completed with a voice recognition program.  Efforts were made to edit the dictations but occasionally words are mis-transcribed.)    DAIMOND Cadena (electronically signed)          [place], [place]  07/01/21 6898

## 2021-07-01 NOTE — ED PROVIDER NOTES
I independently performed a history and physical on [facility] [place]. All diagnostic, treatment, and disposition decisions were made by myself in conjunction with the advanced practice provider. Briefly, this is a 80 y.o. female here for low heart rate in the 30s at home. She fell a few days ago and is on Coumadin. She hit her right forearm and has been having bruising to the right forearm in the setting of Coumadin use. She routinely checks her heart rate at home because she has a history of atrial fibrillation as well as bradycardia. Today she checked and found it to be in the 30s but it immediately jumped back up to the 50s. It usually runs in the 50s to 60s per her. She wanted to be checked today. On my exam she is asymptomatic. No palpitations, shortness of breath, chest pain or arm pain. On exam pt is resting comfortably  Cardiac irregular rhythm no murmur  Lungs clear bilaterally, no increased work of breathing  Abdomen soft nontender  Bruising to the right forearm without edema. Range of motion of the wrist and elbow normal without pain. EKG  The Ekg interpreted by me in the absence of a cardiologist shows. atrial fibrillation with a rate of 78  Axis is   Normal  QTc is  normal  Intervals and Durations are unremarkable. No specific ST-T wave changes appreciated. No evidence of acute ischemia. Occasional PVC  No significant change from prior EKG dated 8-      CT HEAD WO CONTRAST   Final Result   No acute intracranial abnormality. XR CHEST PORTABLE   Final Result   1. Cardiomegaly with no evidence of pulmonary edema   2.  Left basilar atelectasis or pneumonia           Labs Reviewed   PROTIME-INR - Abnormal; Notable for the following components:       Result Value    Protime 41.9 (*)     INR 3.52 (*)     All other components within normal limits    Narrative:     Performed at:  [facility] [address], [place], [address] Phone [phone]   CBC WITH AUTO DIFFERENTIAL - Abnormal; Notable for the following components:    RBC 3.98 (*)     All other components within normal limits    Narrative:     Performed at:  [facility] [address], [facility]   Phone [phone]   COMPREHENSIVE METABOLIC PANEL W/ REFLEX TO MG FOR LOW K - Abnormal; Notable for the following components:    BUN 32 (*)     GFR Non- 58 (*)     All other components within normal limits    Narrative:     Performed at:  [facility] [address], [facility], [facility]   Phone 21  - Abnormal; Notable for the following components:    Pro-BNP 1,028 (*)     All other components within normal limits    Narrative:     Performed at:  [facility] [address], [facility], [facility]   Phone [phone]   URINE RT REFLEX TO CULTURE - Abnormal; Notable for the following components:    Clarity, UA CLOUDY (*)     Leukocyte Esterase, Urine SMALL (*)     All other components within normal limits    Narrative:     Performed at:  [facility] [address], [facility], [facility]   Phone [phone]   MICROSCOPIC URINALYSIS - Abnormal; Notable for the following components:    WBC, UA 6 (*)     All other components within normal limits    Narrative:     Performed at:  [facility] [address], [facility], [facility]   Phone [phone]   TROPONIN    Narrative:     Performed at:  [facility] [address], [facility], [facility]   Phone [phone]     Medications - No data to display      Patient Referrals:  Ashwin Vázquez [place]  [id number]  [phone]    In 1 week    Patient is 59-year-old female presenting to the emergency room for brief resolved episode of bradycardia that occurred while at rest today. On arrival to the emergency room she has normal vital signs. No bradycardia was noted on here. EKG not showing any acute ischemic change. She is chronically in atrial fibrillation, rate controlled. Bruising to her right arm is noted in the setting of a fall but there is no sign of expanding hematoma. The right arm is neurovascularly intact. Laboratory studies as above are unremarkable. Per her report her bradycardia quickly went back up to her baseline 50-60 heart rate. She is chest pain-free with a normal troponin here. Presentation not consistent with ACS. On repeat evaluation she is comfortable and asymptomatic. Her heart rate remains appropriate. At this time I believe she is stable for outpatient follow-up with her PCP. I did instruct her to hold 1 day of Coumadin as her INR today is slightly supratherapeutic. No sign of active bleeding. She will return to the emergency room for any lightheadedness, dizziness, chest pain, trouble breathing or persistent heart rate below 50 on her home pulse oximeter. She is agreeable to this plan. Discharge Medications:  Discharge Medication List as of 7/1/2021  7:48 PM          FINAL IMPRESSION  1. Bradycardia    2. Arm bruise, right, sequela    3. Supratherapeutic INR        Blood pressure (!) 157/60, pulse 85, temperature 98 °F (36.7 °C), temperature source Oral, resp. rate 16, height 5' 5\" (1.651 m), weight 175 lb (79.4 kg), SpO2 96 %.      For further details of [address] [place] [address] emergency department encounter, please see documentation by advanced practice provider        Elier Johns MD  07/01/21 0379

## 2021-07-03 LAB
EKG ATRIAL RATE: 78 BPM
EKG DIAGNOSIS: NORMAL
EKG Q-T INTERVAL: 420 MS
EKG QRS DURATION: 80 MS
EKG QTC CALCULATION (BAZETT): 478 MS
EKG R AXIS: 44 DEGREES
EKG T AXIS: 8 DEGREES
EKG VENTRICULAR RATE: 78 BPM

## 2021-07-03 PROCEDURE — 93010 ELECTROCARDIOGRAM REPORT: CPT | Performed by: INTERNAL MEDICINE

## 2021-07-07 ENCOUNTER — OFFICE VISIT (OUTPATIENT)
Dept: INTERNAL MEDICINE CLINIC | Age: 86
End: 2021-07-07
Payer: MEDICARE

## 2021-07-07 VITALS
HEART RATE: 76 BPM | OXYGEN SATURATION: 97 % | DIASTOLIC BLOOD PRESSURE: 54 MMHG | SYSTOLIC BLOOD PRESSURE: 126 MMHG | WEIGHT: 174 LBS | BODY MASS INDEX: 28.96 KG/M2

## 2021-07-07 DIAGNOSIS — R00.1 BRADYCARDIA: Primary | ICD-10-CM

## 2021-07-07 DIAGNOSIS — I48.11 LONGSTANDING PERSISTENT ATRIAL FIBRILLATION (HCC): ICD-10-CM

## 2021-07-07 PROCEDURE — 99214 OFFICE O/P EST MOD 30 MIN: CPT | Performed by: INTERNAL MEDICINE

## 2021-07-07 PROCEDURE — G8417 CALC BMI ABV UP PARAM F/U: HCPCS | Performed by: INTERNAL MEDICINE

## 2021-07-07 PROCEDURE — 1123F ACP DISCUSS/DSCN MKR DOCD: CPT | Performed by: INTERNAL MEDICINE

## 2021-07-07 PROCEDURE — 1036F TOBACCO NON-USER: CPT | Performed by: INTERNAL MEDICINE

## 2021-07-07 PROCEDURE — 1090F PRES/ABSN URINE INCON ASSESS: CPT | Performed by: INTERNAL MEDICINE

## 2021-07-07 PROCEDURE — G8427 DOCREV CUR MEDS BY ELIG CLIN: HCPCS | Performed by: INTERNAL MEDICINE

## 2021-07-07 PROCEDURE — 4040F PNEUMOC VAC/ADMIN/RCVD: CPT | Performed by: INTERNAL MEDICINE

## 2021-07-07 NOTE — PROGRESS NOTES
Patient: Karis Serrato is a 80 y.o. female who presents today with the following Chief Complaint(s):  Chief Complaint   Patient presents with    Check-Up     ER       HPI     Here today for f/u from ED. Nadeen Fuad on 6/26 (Sunday), did not go to ED but did see Corinne Lady for follow up. No fracture. Fall happened when she was at the kitchen table and preparing meals and lost her balance and started falling backwards. Noticed that she was having low HR, called her cardiologist and ended up going to the ED. HR was in the 30's, has been up into the 50's since she was in the ED. EKG showed a.fib with HR of 78. Had CT of head done that was normal.     Feels tired, no energy. Last saw Dr. Rosanna Carbajal in August 2019. Cannot get an appointment with him until August. Pacemaker was recommended but was told that she does not necessarily need it if she did not want it. Chose not to have pacemaker. Is in need of eye surgery as well. Timolol gtt were stopped by her ophthalmologist incase it was contributing to her bradycardia. Cymbalta does not seem to be helping as well as she would like on the Cymbalta. Does have some improvement on  60 mg vs the 20 mg. Will feel like she has icicles for legs. Hands will sometimes be so cold that she drops her fork.      Lab Results   Component Value Date    WBC 9.1 07/01/2021    HGB 12.6 07/01/2021    HCT 37.7 07/01/2021    MCV 94.7 07/01/2021     07/01/2021     Lab Results   Component Value Date     07/01/2021    K 3.7 07/01/2021     07/01/2021    CO2 24 07/01/2021    BUN 32 07/01/2021    CREATININE 0.9 07/01/2021    GLUCOSE 95 07/01/2021    CALCIUM 9.6 07/01/2021      Lab Results   Component Value Date    ALT 13 07/01/2021    AST 19 07/01/2021    ALKPHOS 80 07/01/2021    BILITOT 0.4 07/01/2021     Pro-BNP 1,028High   0 - 449 pg/mL Final 07/01/2021  4:01 PM [facility]         Allergies   Allergen Reactions    Ace Inhibitors      cough Risk     Difficulty of Paying Living Expenses: Not hard at all   Food Insecurity: No Food Insecurity    Worried About Running Out of Food in the Last Year: Never true    Liz of Food in the Last Year: Never true   Transportation Needs:     Lack of Transportation (Medical):      Lack of Transportation (Non-Medical):    Physical Activity:     Days of Exercise per Week:     Minutes of Exercise per Session:    Stress:     Feeling of Stress :    Social Connections:     Frequency of Communication with Friends and Family:     Frequency of Social Gatherings with Friends and Family:     Attends Scientologist Services:     Active Member of Clubs or Organizations:     Attends Club or Organization Meetings:     Marital Status:    Intimate Partner Violence:     Fear of Current or Ex-Partner:     Emotionally Abused:     Physically Abused:     Sexually Abused:      Family History   Problem Relation Age of Onset    Diabetes Mother     High Blood Pressure Mother     Heart Disease Mother     Diabetes Brother     High Blood Pressure Brother     Heart Disease Brother     Cancer Brother     Rheum Arthritis Daughter         fibromayalgia    Cancer Brother         prostate cancer     Heart Attack Brother         Outpatient Medications Prior to Visit   Medication Sig Dispense Refill    Netarsudil-Latanoprost (ROCKLATAN) 0.02-0.005 % SOLN Place 1 drop into the left eye nightly       DULoxetine (CYMBALTA) 60 MG extended release capsule Take 1 capsule by mouth daily 90 capsule 3    triamterene-hydroCHLOROthiazide (MAXZIDE) 75-50 MG per tablet TAKE 1 TABLET DAILY 90 tablet 3    pravastatin (PRAVACHOL) 10 MG tablet Take 1 tablet daily 90 tablet 3    warfarin (COUMADIN) 5 MG tablet 1 tablet daily 90 tablet 3    olmesartan (BENICAR) 20 MG tablet Take 1 tablet by mouth daily 90 tablet 3    potassium chloride (KLOR-CON SPRINKLE) 10 MEQ extended release capsule Take 1 capsule by mouth 2 times daily 180 capsule 3    amLODIPine (NORVASC) 5 MG tablet TAKE ONE TABLET BY MOUTH DAILY 90 tablet 3    diclofenac sodium (VOLTAREN) 1 % GEL Apply 4 g topically 4 times daily as needed for Pain 100 g 1    brimonidine (ALPHAGAN) 0.2 % ophthalmic solution Place 1 drop into both eyes 3 times daily       LUTEIN PO Take by mouth      dorzolamide (TRUSOPT) 2 % ophthalmic solution Place 1 drop into both eyes 3 times daily       acetaminophen (TYLENOL) 500 MG tablet Take 1,000 mg by mouth three times daily      Glucosamine-Chondroit-Vit C-Mn (GLUCOSAMINE CHONDR 500 COMPLEX PO) Take  by mouth.  MULTIPLE VITAMIN PO Take  by mouth. No facility-administered medications prior to visit. Patient'spast medical history, surgical history, family history, medications,  and allergies  were all reviewed and updated as appropriate today. Review of Systems   Constitutional: Positive for fatigue. Negative for appetite change and fever. Respiratory: Negative for chest tightness and shortness of breath. Cardiovascular: Negative for chest pain. Gastrointestinal: Negative for constipation and diarrhea. Skin: Negative for rash. Neurological: Negative for dizziness, light-headedness and headaches. BP (!) 126/54   Pulse 76   Wt 174 lb (78.9 kg)   SpO2 97%   BMI 28.96 kg/m²   Physical Exam  Vitals and nursing note reviewed. Constitutional:       Appearance: She is well-developed. She is not toxic-appearing. HENT:      Head: Normocephalic. Right Ear: Tympanic membrane, ear canal and external ear normal.      Left Ear: Tympanic membrane, ear canal and external ear normal.   Eyes:      General: No scleral icterus. Extraocular Movements: Extraocular movements intact. Conjunctiva/sclera: Conjunctivae normal.      Pupils: Pupils are equal, round, and reactive to light. Neck:      Thyroid: No thyroid mass or thyromegaly. Vascular: No carotid bruit.    Cardiovascular:      Rate and Rhythm: Bradycardia present. Rhythm irregularly irregular. Heart sounds: Normal heart sounds. No murmur heard. Pulmonary:      Effort: Pulmonary effort is normal.      Breath sounds: Normal breath sounds. Musculoskeletal:      Right lower leg: No edema. Left lower leg: No edema. Lymphadenopathy:      Cervical: No cervical adenopathy. Neurological:      General: No focal deficit present. Mental Status: She is alert and oriented to person, place, and time. Psychiatric:         Mood and Affect: Mood normal.         Behavior: Behavior normal. Behavior is cooperative. ASSESSMENT/PLAN:    Problem List Items Addressed This Visit     Atrial fibrillation [facility])     Patient with problems with bradycardia rather than RVR. INR is supratherapeutic at 3.3 today. Decrease Coumadin to 2.5 mg 2 days weekly and 5 mg the remaining 5 days/week. She will return on Monday, July 12 for repeat INR. Bradycardia - Primary     Discussed bradycardia and work-up for bradycardia. Discussed repeating 30-day event monitor which she did wear in 2019. Discussed possible need for pacemaker. At this time patient states she is feeling well and does not wish to pursue further work-up. She is not sure that she would choose to have a pacemaker. Discussed that pacemaker may improve her energy and make her feel better but she still does not think she would want to pursue pacemaker insertion. Check TSH to rule out hypothyroidism as cause of bradycardia. She is not on any medications that should cause bradycardia.          Relevant Orders    TSH with Reflex          Current Outpatient Medications   Medication Sig Dispense Refill    Netarsudil-Latanoprost (ROCKLATAN) 0.02-0.005 % SOLN Place 1 drop into the left eye nightly       DULoxetine (CYMBALTA) 60 MG extended release capsule Take 1 capsule by mouth daily 90 capsule 3    triamterene-hydroCHLOROthiazide (MAXZIDE) 75-50 MG per tablet TAKE 1 TABLET DAILY 90 tablet 3    pravastatin (PRAVACHOL) 10 MG tablet Take 1 tablet daily 90 tablet 3    warfarin (COUMADIN) 5 MG tablet 1 tablet daily 90 tablet 3    olmesartan (BENICAR) 20 MG tablet Take 1 tablet by mouth daily 90 tablet 3    potassium chloride (KLOR-CON SPRINKLE) 10 MEQ extended release capsule Take 1 capsule by mouth 2 times daily 180 capsule 3    amLODIPine (NORVASC) 5 MG tablet TAKE ONE TABLET BY MOUTH DAILY 90 tablet 3    diclofenac sodium (VOLTAREN) 1 % GEL Apply 4 g topically 4 times daily as needed for Pain 100 g 1    brimonidine (ALPHAGAN) 0.2 % ophthalmic solution Place 1 drop into both eyes 3 times daily       LUTEIN PO Take by mouth      dorzolamide (TRUSOPT) 2 % ophthalmic solution Place 1 drop into both eyes 3 times daily       acetaminophen (TYLENOL) 500 MG tablet Take 1,000 mg by mouth three times daily      Glucosamine-Chondroit-Vit C-Mn (GLUCOSAMINE CHONDR 500 COMPLEX PO) Take  by mouth.  MULTIPLE VITAMIN PO Take  by mouth. No current facility-administered medications for this visit. No follow-ups on file.

## 2021-07-08 PROBLEM — R00.1 BRADYCARDIA: Status: ACTIVE | Noted: 2021-07-08

## 2021-07-08 ASSESSMENT — ENCOUNTER SYMPTOMS
DIARRHEA: 0
CHEST TIGHTNESS: 0
CONSTIPATION: 0
SHORTNESS OF BREATH: 0

## 2021-07-09 NOTE — ASSESSMENT & PLAN NOTE
Discussed bradycardia and work-up for bradycardia. Discussed repeating 30-day event monitor which she did wear in 2019. Discussed possible need for pacemaker. At this time patient states she is feeling well and does not wish to pursue further work-up. She is not sure that she would choose to have a pacemaker. Discussed that pacemaker may improve her energy and make her feel better but she still does not think she would want to pursue pacemaker insertion. Check TSH to rule out hypothyroidism as cause of bradycardia. She is not on any medications that should cause bradycardia.

## 2021-07-09 NOTE — ASSESSMENT & PLAN NOTE
Patient with problems with bradycardia rather than RVR. INR is supratherapeutic at 3.3 today. Decrease Coumadin to 2.5 mg 2 days weekly and 5 mg the remaining 5 days/week. She will return on Monday, July 12 for repeat INR.

## 2021-07-12 ENCOUNTER — NURSE ONLY (OUTPATIENT)
Dept: INTERNAL MEDICINE CLINIC | Age: 86
End: 2021-07-12
Payer: MEDICARE

## 2021-07-12 DIAGNOSIS — R00.1 BRADYCARDIA: ICD-10-CM

## 2021-07-12 DIAGNOSIS — I48.11 LONGSTANDING PERSISTENT ATRIAL FIBRILLATION (HCC): Primary | ICD-10-CM

## 2021-07-12 LAB
INTERNATIONAL NORMALIZATION RATIO, POC: 3.7
PROTHROMBIN TIME, POC: NORMAL

## 2021-07-12 PROCEDURE — 85610 PROTHROMBIN TIME: CPT | Performed by: INTERNAL MEDICINE

## 2021-07-13 LAB — TSH REFLEX: 1.69 UIU/ML (ref 0.27–4.2)

## 2021-07-15 ENCOUNTER — NURSE ONLY (OUTPATIENT)
Dept: INTERNAL MEDICINE CLINIC | Age: 86
End: 2021-07-15
Payer: MEDICARE

## 2021-07-15 DIAGNOSIS — Z79.01 ANTICOAGULATED ON COUMADIN: Primary | ICD-10-CM

## 2021-07-15 LAB
INTERNATIONAL NORMALIZATION RATIO, POC: 1.4
PROTHROMBIN TIME, POC: ABNORMAL

## 2021-07-15 PROCEDURE — 85610 PROTHROMBIN TIME: CPT | Performed by: INTERNAL MEDICINE

## 2021-07-16 ENCOUNTER — TELEPHONE (OUTPATIENT)
Dept: INTERNAL MEDICINE CLINIC | Age: 86
End: 2021-07-16

## 2021-07-16 NOTE — TELEPHONE ENCOUNTER
Patient was instructed to take Coumadin 5mg 5 days a week and 2.5 mg the other 2 days, she will repeat INR in 5 days.

## 2021-07-30 DIAGNOSIS — I10 ESSENTIAL HYPERTENSION: ICD-10-CM

## 2021-07-30 DIAGNOSIS — E78.5 HYPERLIPIDEMIA, UNSPECIFIED HYPERLIPIDEMIA TYPE: ICD-10-CM

## 2021-08-02 RX ORDER — POTASSIUM CHLORIDE 750 MG/1
10 CAPSULE, EXTENDED RELEASE ORAL 2 TIMES DAILY
Qty: 180 CAPSULE | Refills: 3 | Status: SHIPPED | OUTPATIENT
Start: 2021-08-02 | End: 2021-08-26 | Stop reason: SDUPTHER

## 2021-08-03 ENCOUNTER — OFFICE VISIT (OUTPATIENT)
Dept: INTERNAL MEDICINE CLINIC | Age: 86
End: 2021-08-03
Payer: MEDICARE

## 2021-08-03 VITALS
DIASTOLIC BLOOD PRESSURE: 70 MMHG | WEIGHT: 175 LBS | HEART RATE: 80 BPM | BODY MASS INDEX: 29.12 KG/M2 | OXYGEN SATURATION: 98 % | SYSTOLIC BLOOD PRESSURE: 138 MMHG

## 2021-08-03 DIAGNOSIS — M17.12 PRIMARY OSTEOARTHRITIS OF LEFT KNEE: Primary | ICD-10-CM

## 2021-08-03 PROCEDURE — 1123F ACP DISCUSS/DSCN MKR DOCD: CPT | Performed by: INTERNAL MEDICINE

## 2021-08-03 PROCEDURE — G8417 CALC BMI ABV UP PARAM F/U: HCPCS | Performed by: INTERNAL MEDICINE

## 2021-08-03 PROCEDURE — 4040F PNEUMOC VAC/ADMIN/RCVD: CPT | Performed by: INTERNAL MEDICINE

## 2021-08-03 PROCEDURE — 99213 OFFICE O/P EST LOW 20 MIN: CPT | Performed by: INTERNAL MEDICINE

## 2021-08-03 PROCEDURE — 1036F TOBACCO NON-USER: CPT | Performed by: INTERNAL MEDICINE

## 2021-08-03 PROCEDURE — G8427 DOCREV CUR MEDS BY ELIG CLIN: HCPCS | Performed by: INTERNAL MEDICINE

## 2021-08-03 PROCEDURE — 1090F PRES/ABSN URINE INCON ASSESS: CPT | Performed by: INTERNAL MEDICINE

## 2021-08-03 RX ORDER — LIDOCAINE 4 G/G
1 PATCH TOPICAL DAILY PRN
Qty: 30 PATCH | Refills: 1 | Status: SHIPPED | OUTPATIENT
Start: 2021-08-03 | End: 2022-01-14 | Stop reason: ALTCHOICE

## 2021-08-03 RX ORDER — TRAMADOL HYDROCHLORIDE 50 MG/1
25-50 TABLET ORAL EVERY 8 HOURS PRN
Qty: 30 TABLET | Refills: 0 | Status: SHIPPED | OUTPATIENT
Start: 2021-08-03 | End: 2021-09-02

## 2021-08-03 NOTE — PROGRESS NOTES
Patient: Eric Wilkes is a 80 y.o. female who presents today with the following Chief Complaint(s):  Chief Complaint   Patient presents with    Knee Pain       HPI     Here today c/o worsening knee pain. Was told 4 years ago that she needed a TKA but declined d/t her age. Pain has been worse over the past 2 weeks, when she stands ups and starts walking she never knows how much pain she is going to have. Does take Tylenol and uses Voltaren gel (which lasts for about 10 minutes). Allergies   Allergen Reactions    Ace Inhibitors      cough    Catapres [Clonidine Hcl]      patch    Diovan [Valsartan]      ineffective    Eszopiclone      ineffective    Morphine      All derivatives- GI upset      Past Medical History:   Diagnosis Date    Arthritis     OA    Atrial fibrillation (HCC)     Blood transfusion     1975    Carpal tunnel syndrome     mild    Dizziness     Edema     Frequent falls     Glaucoma     [place] (hard of hearing)     bilateral hearing aids    Hx of cholelithiasis     Hyperlipidemia     Hypertension     Osteoarthrosis, unspecified whether generalized or localized, unspecified site     Postmenopausal atrophic vaginitis     Unspecified disorder of autonomic nervous system     Unspecified essential hypertension     Unspecified eustachian tube disorder     Unspecified glaucoma(365.9)     Unspecified hemorrhoids without mention of complication     Urethral stricture unspecified       Past Surgical History:   Procedure Laterality Date    COLONOSCOPY      ELBOW SURGERY      left I&D    EYE SURGERY      cataracts    HEMORRHOID SURGERY      HYSTERECTOMY      1 ovary left ;later oophorectomy     KNEE CARTILAGE SURGERY      LAMINECTOMY  1/19/12    L  3-4 bilateral laminectomy discectomy      Social History     Socioeconomic History    Marital status:       Spouse name: Not on file    Number of children: Not on file    Years of education: Not on file    Highest education level: Not on file   Occupational History    Not on file   Tobacco Use    Smoking status: Never Smoker    Smokeless tobacco: Never Used   Vaping Use    Vaping Use: Never used   Substance and Sexual Activity    Alcohol use: Yes     Alcohol/week: 0.0 standard drinks     Comment: wine with meals occ    Drug use: No    Sexual activity: Not on file   Other Topics Concern    Not on file   Social History Narrative    3  siblings    7 children    3 sons live close by     Social Determinants of Health     Financial Resource Strain: Low Risk     Difficulty of Paying Living Expenses: Not hard at all   Food Insecurity: No Food Insecurity    Worried About Running Out of Food in the Last Year: Never true    [address] in the Last Year: Never true   Transportation Needs:     Lack of Transportation (Medical):      Lack of Transportation (Non-Medical):    Physical Activity:     Days of Exercise per Week:     Minutes of Exercise per Session:    Stress:     Feeling of Stress :    Social Connections:     Frequency of Communication with Friends and Family:     Frequency of Social Gatherings with Friends and Family:     Attends Quaker Services:     Active Member of Clubs or Organizations:     Attends Club or Organization Meetings:     Marital Status:    Intimate Partner Violence:     Fear of Current or Ex-Partner:     Emotionally Abused:     Physically Abused:     Sexually Abused:      Family History   Problem Relation Age of Onset    Diabetes Mother     High Blood Pressure Mother     Heart Disease Mother     Diabetes Brother     High Blood Pressure Brother     Heart Disease Brother     Cancer Brother     Rheum Arthritis Daughter         fibromayalgia    Cancer Brother         prostate cancer     Heart Attack Brother         Outpatient Medications Prior to Visit   Medication Sig Dispense Refill    potassium chloride (KLOR-CON SPRINKLE) 10 MEQ extended release capsule Take 1 capsule by mouth 2 times daily 180 capsule 3    DULoxetine (CYMBALTA) 60 MG extended release capsule Take 1 capsule by mouth daily 90 capsule 3    Netarsudil-Latanoprost (ROCKLATAN) 0.02-0.005 % SOLN Place 1 drop into the left eye nightly       triamterene-hydroCHLOROthiazide (MAXZIDE) 75-50 MG per tablet TAKE 1 TABLET DAILY 90 tablet 3    pravastatin (PRAVACHOL) 10 MG tablet Take 1 tablet daily 90 tablet 3    warfarin (COUMADIN) 5 MG tablet 1 tablet daily 90 tablet 3    olmesartan (BENICAR) 20 MG tablet Take 1 tablet by mouth daily 90 tablet 3    amLODIPine (NORVASC) 5 MG tablet TAKE ONE TABLET BY MOUTH DAILY 90 tablet 3    diclofenac sodium (VOLTAREN) 1 % GEL Apply 4 g topically 4 times daily as needed for Pain 100 g 1    brimonidine (ALPHAGAN) 0.2 % ophthalmic solution Place 1 drop into both eyes 3 times daily       LUTEIN PO Take by mouth      dorzolamide (TRUSOPT) 2 % ophthalmic solution Place 1 drop into both eyes 3 times daily       acetaminophen (TYLENOL) 500 MG tablet Take 1,000 mg by mouth three times daily      Glucosamine-Chondroit-Vit C-Mn (GLUCOSAMINE CHONDR 500 COMPLEX PO) Take  by mouth.  MULTIPLE VITAMIN PO Take  by mouth. No facility-administered medications prior to visit. Patient'spast medical history, surgical history, family history, medications,  and allergies  were all reviewed and updated as appropriate today. Review of Systems   Constitutional: Negative for fever. Musculoskeletal: Negative for gait problem. /70   Pulse 80   Wt 175 lb (79.4 kg)   SpO2 98%   BMI 29.12 kg/m²   Physical Exam  Constitutional:       General: She is not in acute distress. Appearance: Normal appearance. She is not ill-appearing. Musculoskeletal:      Right knee: Bony tenderness and crepitus present. No swelling or deformity. Decreased range of motion. Tenderness present. Neurological:      Mental Status: She is alert.          ASSESSMENT/PLAN:    Problem List Items Addressed This Visit     Osteoarthritis - Primary      Continue with Voltaren gel, Tylenol, and ice. May also try heat, particularly after she has been sitting for prolonged periods of time. Trial of the lidocaine patch and tramadol 50 mg 1/2-1 3 times daily as needed for severe pain. Discussed possible referral to orthopedics for injections in her knee. She would like to try conservative management first.         Relevant Medications    lidocaine 4 % external patch    traMADol (ULTRAM) 50 MG tablet          Current Outpatient Medications   Medication Sig Dispense Refill    lidocaine 4 % external patch Place 1 patch onto the skin daily as needed (pain) 30 patch 1    traMADol (ULTRAM) 50 MG tablet Take 0.5-1 tablets by mouth every 8 hours as needed for Pain for up to 30 days.  30 tablet 0    potassium chloride (KLOR-CON SPRINKLE) 10 MEQ extended release capsule Take 1 capsule by mouth 2 times daily 180 capsule 3    DULoxetine (CYMBALTA) 60 MG extended release capsule Take 1 capsule by mouth daily 90 capsule 3    Netarsudil-Latanoprost (ROCKLATAN) 0.02-0.005 % SOLN Place 1 drop into the left eye nightly       triamterene-hydroCHLOROthiazide (MAXZIDE) 75-50 MG per tablet TAKE 1 TABLET DAILY 90 tablet 3    pravastatin (PRAVACHOL) 10 MG tablet Take 1 tablet daily 90 tablet 3    warfarin (COUMADIN) 5 MG tablet 1 tablet daily 90 tablet 3    olmesartan (BENICAR) 20 MG tablet Take 1 tablet by mouth daily 90 tablet 3    amLODIPine (NORVASC) 5 MG tablet TAKE ONE TABLET BY MOUTH DAILY 90 tablet 3    diclofenac sodium (VOLTAREN) 1 % GEL Apply 4 g topically 4 times daily as needed for Pain 100 g 1    brimonidine (ALPHAGAN) 0.2 % ophthalmic solution Place 1 drop into both eyes 3 times daily       LUTEIN PO Take by mouth      dorzolamide (TRUSOPT) 2 % ophthalmic solution Place 1 drop into both eyes 3 times daily       acetaminophen (TYLENOL) 500 MG tablet Take 1,000 mg by mouth three times daily      Glucosamine-Chondroit-Vit C-Mn (GLUCOSAMINE CHONDR 500 COMPLEX PO) Take  by mouth.  MULTIPLE VITAMIN PO Take  by mouth. No current facility-administered medications for this visit. No follow-ups on file.

## 2021-08-04 NOTE — ASSESSMENT & PLAN NOTE
Continue with Voltaren gel, Tylenol, and ice. May also try heat, particularly after she has been sitting for prolonged periods of time. Trial of the lidocaine patch and tramadol 50 mg 1/2-1 3 times daily as needed for severe pain. Discussed possible referral to orthopedics for injections in her knee.   She would like to try conservative management first.

## 2021-08-26 DIAGNOSIS — E78.5 HYPERLIPIDEMIA, UNSPECIFIED HYPERLIPIDEMIA TYPE: ICD-10-CM

## 2021-08-26 DIAGNOSIS — I10 ESSENTIAL HYPERTENSION: ICD-10-CM

## 2021-08-26 RX ORDER — POTASSIUM CHLORIDE 750 MG/1
10 CAPSULE, EXTENDED RELEASE ORAL 2 TIMES DAILY
Qty: 180 CAPSULE | Refills: 3 | Status: SHIPPED | OUTPATIENT
Start: 2021-08-26 | End: 2022-05-26

## 2021-09-07 ENCOUNTER — VIRTUAL VISIT (OUTPATIENT)
Dept: INTERNAL MEDICINE CLINIC | Age: 86
End: 2021-09-07
Payer: MEDICARE

## 2021-09-07 DIAGNOSIS — Z00.00 ROUTINE GENERAL MEDICAL EXAMINATION AT A HEALTH CARE FACILITY: Primary | ICD-10-CM

## 2021-09-07 PROCEDURE — G0438 PPPS, INITIAL VISIT: HCPCS | Performed by: INTERNAL MEDICINE

## 2021-09-07 PROCEDURE — 4040F PNEUMOC VAC/ADMIN/RCVD: CPT | Performed by: INTERNAL MEDICINE

## 2021-09-07 PROCEDURE — 1123F ACP DISCUSS/DSCN MKR DOCD: CPT | Performed by: INTERNAL MEDICINE

## 2021-09-07 ASSESSMENT — PATIENT HEALTH QUESTIONNAIRE - PHQ9
SUM OF ALL RESPONSES TO PHQ QUESTIONS 1-9: 0
SUM OF ALL RESPONSES TO PHQ9 QUESTIONS 1 & 2: 0
SUM OF ALL RESPONSES TO PHQ9 QUESTIONS 1 & 2: 0
SUM OF ALL RESPONSES TO PHQ QUESTIONS 1-9: 0
2. FEELING DOWN, DEPRESSED OR HOPELESS: 0
1. LITTLE INTEREST OR PLEASURE IN DOING THINGS: 0
2. FEELING DOWN, DEPRESSED OR HOPELESS: 0
1. LITTLE INTEREST OR PLEASURE IN DOING THINGS: 0
SUM OF ALL RESPONSES TO PHQ QUESTIONS 1-9: 0
SUM OF ALL RESPONSES TO PHQ QUESTIONS 1-9: 0

## 2021-09-07 ASSESSMENT — LIFESTYLE VARIABLES
HAVE YOU OR SOMEONE ELSE BEEN INJURED AS A RESULT OF YOUR DRINKING: 0
HOW OFTEN DURING THE LAST YEAR HAVE YOU NEEDED AN ALCOHOLIC DRINK FIRST THING IN THE MORNING TO GET YOURSELF GOING AFTER A NIGHT OF HEAVY DRINKING: NEVER
HOW OFTEN DURING THE LAST YEAR HAVE YOU FOUND THAT YOU WERE NOT ABLE TO STOP DRINKING ONCE YOU HAD STARTED: NEVER
HOW OFTEN DURING THE LAST YEAR HAVE YOU FOUND THAT YOU WERE NOT ABLE TO STOP DRINKING ONCE YOU HAD STARTED: 0
HOW MANY STANDARD DRINKS CONTAINING ALCOHOL DO YOU HAVE ON A TYPICAL DAY: 0
HOW OFTEN DURING THE LAST YEAR HAVE YOU FAILED TO DO WHAT WAS NORMALLY EXPECTED FROM YOU BECAUSE OF DRINKING: 0
AUDIT-C TOTAL SCORE: 0
HOW OFTEN DO YOU HAVE SIX OR MORE DRINKS ON ONE OCCASION: 0
HOW OFTEN DURING THE LAST YEAR HAVE YOU BEEN UNABLE TO REMEMBER WHAT HAPPENED THE NIGHT BEFORE BECAUSE YOU HAD BEEN DRINKING: NEVER
HOW OFTEN DURING THE LAST YEAR HAVE YOU HAD A FEELING OF GUILT OR REMORSE AFTER DRINKING: 0
HOW MANY STANDARD DRINKS CONTAINING ALCOHOL DO YOU HAVE ON A TYPICAL DAY: ONE OR TWO
AUDIT TOTAL SCORE: 1
AUDIT TOTAL SCORE: 0
HOW OFTEN DO YOU HAVE A DRINK CONTAINING ALCOHOL: 1
AUDIT-C TOTAL SCORE: 1
HAS A RELATIVE, FRIEND, DOCTOR, OR ANOTHER HEALTH PROFESSIONAL EXPRESSED CONCERN ABOUT YOUR DRINKING OR SUGGESTED YOU CUT DOWN: 0
HOW OFTEN DURING THE LAST YEAR HAVE YOU NEEDED AN ALCOHOLIC DRINK FIRST THING IN THE MORNING TO GET YOURSELF GOING AFTER A NIGHT OF HEAVY DRINKING: 0
HOW OFTEN DURING THE LAST YEAR HAVE YOU HAD A FEELING OF GUILT OR REMORSE AFTER DRINKING: NEVER
HOW OFTEN DO YOU HAVE SIX OR MORE DRINKS ON ONE OCCASION: NEVER
HOW OFTEN DO YOU HAVE A DRINK CONTAINING ALCOHOL: MONTHLY OR LESS
HAS A RELATIVE, FRIEND, DOCTOR, OR ANOTHER HEALTH PROFESSIONAL EXPRESSED CONCERN ABOUT YOUR DRINKING OR SUGGESTED YOU CUT DOWN: NO
HOW OFTEN DURING THE LAST YEAR HAVE YOU BEEN UNABLE TO REMEMBER WHAT HAPPENED THE NIGHT BEFORE BECAUSE YOU HAD BEEN DRINKING: 0
HOW OFTEN DURING THE LAST YEAR HAVE YOU FAILED TO DO WHAT WAS NORMALLY EXPECTED FROM YOU BECAUSE OF DRINKING: NEVER
HAVE YOU OR SOMEONE ELSE BEEN INJURED AS A RESULT OF YOUR DRINKING: NO
HOW OFTEN DO YOU HAVE A DRINK CONTAINING ALCOHOL: 0

## 2021-09-07 NOTE — PATIENT INSTRUCTIONS
make even the most youthful senior more prone to accidents. Falls are one of the leading health risks for older people. This increased risk of falling is related to:   Aging process (eg, decreased muscle strength, slowed reflexes)   Higher incidence of chronic health problems (eg, arthritis, diabetes) that may limit mobility, agility or sensory awareness   Side effects of medicine (eg, dizziness, blurred vision)especially medicines like prescription pain medicines and drugs used to treat mental health conditions   Depending on the brittleness of your bones, the consequences of a fall can be serious and long lasting. Home Life   Research by the Association of Aging [facility]) shows that some home accidents among older adults can be prevented by making simple lifestyle changes and basic modifications and repairs to the home environment. Here are some lifestyle changes that experts recommend:   Have your hearing and vision checked regularly. Be sure to wear prescription glasses that are right for you. Speak to your doctor or pharmacist about the possible side effects of your medicines. A number of medicines can cause dizziness. If you have problems with sleep, talk to your doctor. Limit your intake of alcohol. If necessary, use a cane or walker to help maintain your balance. Wear supportive, rubber-soled shoes, even at home. If you live in a region that gets wintry weather, you may want to put special cleats on your shoes to prevent you from slipping on the snow and ice. Exercise regularly to help maintain muscle tone, agility, and balance. Always hold the banister when going up or down stairs. Also, use  bars when getting in or out of the bath or shower, or using the toilet. To avoid dizziness, get up slowly from a lying down position. Sit up first, dangling your legs for a minute or two before rising to a standing position.    Overall Home Safety Check   According to the Consumer Product Safety Commision's \"Older Consumer Home Safety Checklist,\" it is important to check for potential hazards in each room. And remember, proper lighting is an essential factor in home safety. If you cannot see clearly, you are more likely to fall. Important questions to ask yourself include:   Are lamp, electric, extension, and telephone cords placed out of the flow of traffic and maintained in good condition? Have frayed cords been replaced? Are all small rugs and runners slip resistant? If not, you can secure them to the floor with a special double-sided carpet tape. Are smoke detectors properly locatedone on every floor of your home and one outside of every sleeping area? Are they in good working order? Are batteries replaced at least once a year? Do you have a well-maintained carbon monoxide detector outside every sleeping are in your home? Does your furniture layout leave plenty of space to maneuver between and around chairs, tables, beds, and sofas? Are hallways, stairs and passages between rooms well lit? Can you reach a lamp without getting out of bed? Are floor surfaces well maintained? Shag rugs, high-pile carpeting, tile floors, and polished wood floors can be particularly slippery. Stairs should always have handrails and be carpeted or fitted with a non-skid tread. Is your telephone easily reachable. Is the cord safely tucked away? Room by Room   According to the Association of Aging, bathrooms and rachael are the two most potentially hazardous rooms in your home. In the Kitchen    Be sure your stove is in proper working order and always make sure burners and the oven are off before you go out or go to sleep. Keep pots on the back burners, turn handles away from the front of the stove, and keep stove clean and free of grease build-up. Kitchen ventilation systems and range exhausts should be working properly.     Keep flammable objects such as towels and pot holders away from the cooking area except when in use. Make sure kitchen curtains are tied back. Move cords and appliances away from the sink and hot surfaces. If extension cords are needed, install wiring guides so they do not hang over the sink, range, or working areas. Look for coffee pots, kettles and toaster ovens with automatic shut-offs. Keep a mop handy in the kitchen so you can wipe up spills instantly. You should also have a small fire extinguisher. Arrange your kitchen with frequently used items on lower shelves to avoid the need to stand on a stepstool to reach them. Make sure countertops are well-lit to avoid injuries while cutting and preparing food. In the Bathroom    Use a non-slip mat or decals in the tub and shower, since wet, soapy tile or porcelain surfaces are extremely slippery. Make sure bathroom rugs are non-skid or tape them firmly to the floor. Bathtubs should have at least one, preferably two, grab bars, firmly attached to structural supports in the wall. (Do not use built-in soap holders or glass shower doors as grab bars.)    Tub seats fitted with non-slip material on the legs allow you to wash sitting down. For people with limited mobility, bathtub transfer benches allow you to slide safely into the tub. Raised toilet seats and toilet safety rails are helpful for those with knee or hip problems. In the [facility]    Make sure you use a nightlight and that the area around your bed is clear of potential obstacles. Be careful with electric blankets and never go to sleep with a heating pad, which can cause serious burns even if on a low setting. Use fire-resistant mattress covers and pillows, and NEVER smoke in bed. Keep a phone next to the bed that is programmed to dial 911 at the push of a button. If you have a chronic condition, you may want to sign on with an automatic call-in service.  Typically the system includes a small pendant that connects directly to an emergency medical voice-response system. You should also make arrangements to stay in contact with someonefriend, neighbor, family memberon a regular schedule. Fire Prevention   According to the [employer] (Smoke Alarms for Every) [facility], senior citizens are one of the two highest risk groups for death and serious injuries due to residential fires. When cooking, wear short-sleeved items, never a bulky long-sleeved robe. The [facility]'s Safety Checklist for Older Consumers emphasizes the importance of checking basements, garages, workshops and storage areas for fire hazards, such as volatile liquids, piles of old rags or clothing and overloaded circuits. Never smoke in bed or when lying down on a couch or recliner chair. Small portable electric or kerosene heaters are responsible for many home fires and should be used cautiously if at all. If you do use one, be sure to keep them away from flammable materials. In case of fire, make sure you have a pre-established emergency exit plan. Have a professional check your fireplace and other fuel-burning appliances yearly. Helping Hands   Baby boomers entering the crane years will continue to see the development of new products to help older adults live safely and independently in spite of age-related changes. Making Life More Livable  , by Zac Fontaine, lists over 1,000 products for \"living well in the mature years,\" such as bathing and mobility aids, household security devices, ergonomically designed knives and peelers, and faucet valves and knobs for temperature control. Medical supply stores and organizations are good sources of information about products that improve your quality of life and insure your safety.      Last Reviewed: November 2009 Ale Crump MD   Updated: 3/7/2011     ·

## 2021-09-07 NOTE — PROGRESS NOTES
Medicare Annual Wellness Visit  Name: David García Date: 2021   MRN: [id number] Sex: Female   Age: 80 y.o. Ethnicity: Non- / Non    : 1927 Race: White (non-)      Leonardo Green is here for Medicare AWV    Screenings for behavioral, psychosocial and functional/safety risks, and cognitive dysfunction are all negative except as indicated below. These results, as well as other patient data from the [address] form, are documented in Flowsheets linked to this Encounter. Allergies   Allergen Reactions    Ace Inhibitors      cough    Catapres [Clonidine Hcl]      patch    Diovan [Valsartan]      ineffective    Eszopiclone      ineffective    Morphine      All derivatives- GI upset    Tramadol Hcl      dizziness       Prior to Visit Medications    Medication Sig Taking?  Authorizing Provider   potassium chloride (KLOR-CON SPRINKLE) 10 MEQ extended release capsule Take 1 capsule by mouth 2 times daily  Love Medellin, DO   lidocaine 4 % external patch Place 1 patch onto the skin daily as needed (pain)  Love Medellin, DO   DULoxetine (CYMBALTA) 60 MG extended release capsule Take 1 capsule by mouth daily  Love Medellin, DO   Netarsudil-Latanoprost (ROCKLATAN) 0.02-0.005 % SOLN Place 1 drop into the left eye nightly   Historical Provider, MD   triamterene-hydroCHLOROthiazide (MAXZIDE) 75-50 MG per tablet TAKE 1 TABLET DAILY  Love Medellin, DO   pravastatin (PRAVACHOL) 10 MG tablet Take 1 tablet daily  Love Medellin, DO   warfarin (COUMADIN) 5 MG tablet 1 tablet daily  Love Medellin, DO   olmesartan (BENICAR) 20 MG tablet Take 1 tablet by mouth daily  Love Medellin, DO   amLODIPine (NORVASC) 5 MG tablet TAKE ONE TABLET BY MOUTH DAILY  Love Medellin, DO   diclofenac sodium (VOLTAREN) 1 % GEL Apply 4 g topically 4 times daily as needed for Pain  Love Medellin, DO   brimonidine (ALPHAGAN) 0.2 % ophthalmic solution Place 1 drop providers/suppliers regularly involved in providing care):   Patient Care Team:  Maryana Bermudez DO as PCP - General (Internal Medicine)  Maryana Bermudez DO as PCP - REHABILITATION [facility] Empaneled Provider  Makenzie Newton MD as Consulting Physician (Otolaryngology)    Wt Readings from Last 3 Encounters:   08/03/21 175 lb (79.4 kg)   07/07/21 174 lb (78.9 kg)   07/01/21 175 lb (79.4 kg)     Patient reported vitals  B/p 130/70  Height 5 ft 5 in  Weight 175 lb    Based upon direct observation of the patient, evaluation of cognition reveals recent and remote memory intact. Patient's complete Health Risk Assessment and screening values have been reviewed and are found in Flowsheets. The following problems were reviewed today and where indicated follow up appointments were made and/or referrals ordered. Positive Risk Factor Screenings with Interventions:     Fall Risk:  2 or more falls in past year?: (!) yes  Fall with injury in past year?: (!) yes  Fall Risk Interventions:    · Home safety tips provided   · Impaired balance due to peripheral neuropathy- uses walker. Declines PT referral. Fall precautions reinforced. Cognitive:  Clock Drawing Test (CDT) Score: Normal  Total Score Interpretation: Positive Mini-Cog  Did the patient refuse to take the cognition test?: No  Cognitive Impairment Interventions:  · Cognitive screening WNL       General Health and ACP:  General  In general, how would you say your health is?: Fair  In the past 7 days, have you experienced any of the following?  New or Increased Pain, New or Increased Fatigue, Loneliness, Social Isolation, Stress or Anger?: (!) New or Increased Pain  Do you get the social and emotional support that you need?: Yes  Do you have a Living Will?: Yes  Advance Directives     Power of  Living Will ACP-Advance Directive ACP-Power of     Not on File Filed on 01/21/12 Filed Not on File      General Health Risk Interventions:  · Pain issues: Left knee pain - managed with Voltaren gel, Tylenol, Ice, Lidocaine patches. No relief with Tramadol. Appointment with Dr. Flor Schaefer on 9/20/21. Health Habits/Nutrition:  Health Habits/Nutrition  Do you exercise for at least 20 minutes 2-3 times per week?: (!) No  Have you lost any weight without trying in the past 3 months?: No  Do you eat only one meal per day?: (!) Yes  Have you seen the dentist within the past year?: Yes     Health Habits/Nutrition Interventions:  · Inadequate physical activity:  patient is not ready to increase his/her physical activity level at this time  · Nutritional issues:  Entered in error -  Eats at least one meal per day. Hearing/Vision:  No exam data present  Hearing/Vision  Do you or your family notice any trouble with your hearing that hasn't been managed with hearing aids?: No  Do you have difficulty driving, watching TV, or doing any of your daily activities because of your eyesight?: (!) Yes  Have you had an eye exam within the past year?: Yes  Hearing/Vision Interventions:  · Vision concerns:  Glaucoma and Macular Degeneration - follows with [facility]. On multiple eye drops. No longer drvies due to impaired vision. ADL:  ADLs  In the past 7 days, did you need help from others to perform any of the following everyday activities? Eating, dressing, grooming, bathing, toileting, or walking/balance?: (!) Bathing  In the past 7 days, did you need help from others to take care of any of the following? Laundry, housekeeping, banking/finances, shopping, telephone use, food preparation, transportation, or taking medications?: [employer], Housekeeping, Shopping, Transportation, Taking Medications  ADL Interventions:  · Patient declines any further evaluation/treatment for this issue   · Declines referral to [facility] - has private pay caregiver who assists 8 hours per week.      Personalized Preventive Plan   Current Health Maintenance Status  Immunization History   Administered Date(s) Administered  COVID-19, Pfizer, PF, 30mcg/0.3mL 01/28/2021, 02/18/2021    Influenza Vaccine, unspecified formulation 11/01/2016    Influenza Virus Vaccine 10/15/2014    Influenza Whole 10/11/2011    Influenza, High Dose (Fluzone 65 yrs and older) 09/14/2015, 09/01/2017, 10/25/2018, 09/05/2019    Influenza, Quadv, adjuvanted, 65 yrs +, IM, PF (Fluad) 10/08/2020    Pneumococcal Conjugate 13-valent ([id number]) 01/13/2016    Pneumococcal Polysaccharide ([id number]) 01/11/2007    Td, unspecified formulation 03/07/2018    Zoster Live (Zostavax) 01/01/2013    Zoster Recombinant (Shingrix) 09/05/2019, 12/19/2019, 12/20/2019        Health Maintenance   Topic Date Due    DTaP/Tdap/Td vaccine (1 - Tdap) 03/08/2018    Annual Wellness Visit (AWV)  Never done    Flu vaccine (1) 09/01/2021    Lipid screen  04/05/2022    Potassium monitoring  07/01/2022    Creatinine monitoring  07/01/2022    Shingles Vaccine  Completed    Pneumococcal 65+ years Vaccine  Completed    COVID-19 Vaccine  Completed    Hepatitis A vaccine  Aged Out    Hepatitis B vaccine  Aged Out    Hib vaccine  Aged Out    Meningococcal (ACWY) vaccine  Aged Out     Recommendations for Latio Due: see orders and patient instructions/AVS.  . Recommended screening schedule for the next 5-10 years is provided to the patient in written form: see Patient Instructions/AVS.    I, Nina Dykes RN, 9/7/2021, performed the documented evaluation under the direct supervision of the attending physician. Tamra Mckinney, was evaluated through a synchronous (real-time) phone encounter. The patient (or guardian if applicable) is aware that this is a billable service. Verbal consent to proceed has been obtained within the past 12 months. The visit was conducted pursuant to the emergency declaration under the [address] [facility], [address] [place] [address] [facility] authority and the [employer] General Act. Patient identification was verified, and a caregiver was present when appropriate. The patient was located in a state where the provider was credentialed to provide care. Total time spent for this encounter: 25 minutes    --Kathy Iglesias RN on 9/7/2021 at 3:11 PM    An electronic signature was used to authenticate this note. This encounter was performed under , [address] [place] [address], [facility], direct supervision, 9/7/2021.

## 2021-09-09 DIAGNOSIS — Z79.01 ANTICOAGULATED ON COUMADIN: Primary | ICD-10-CM

## 2021-09-09 LAB
INTERNATIONAL NORMALIZATION RATIO, POC: 2.1
PROTHROMBIN TIME, POC: NORMAL

## 2021-09-09 PROCEDURE — 85610 PROTHROMBIN TIME: CPT | Performed by: INTERNAL MEDICINE

## 2021-09-14 ENCOUNTER — OFFICE VISIT (OUTPATIENT)
Dept: INTERNAL MEDICINE CLINIC | Age: 86
End: 2021-09-14
Payer: MEDICARE

## 2021-09-14 VITALS
SYSTOLIC BLOOD PRESSURE: 132 MMHG | HEART RATE: 72 BPM | DIASTOLIC BLOOD PRESSURE: 60 MMHG | WEIGHT: 178.38 LBS | OXYGEN SATURATION: 98 % | BODY MASS INDEX: 29.68 KG/M2

## 2021-09-14 DIAGNOSIS — E78.5 HYPERLIPIDEMIA LDL GOAL <100: ICD-10-CM

## 2021-09-14 DIAGNOSIS — M48.062 SPINAL STENOSIS OF LUMBAR REGION WITH NEUROGENIC CLAUDICATION: ICD-10-CM

## 2021-09-14 DIAGNOSIS — J96.01 ACUTE RESPIRATORY FAILURE WITH HYPOXIA (HCC): ICD-10-CM

## 2021-09-14 DIAGNOSIS — G60.9 IDIOPATHIC PERIPHERAL NEUROPATHY: Primary | ICD-10-CM

## 2021-09-14 DIAGNOSIS — I10 ESSENTIAL HYPERTENSION: ICD-10-CM

## 2021-09-14 DIAGNOSIS — I48.11 LONGSTANDING PERSISTENT ATRIAL FIBRILLATION (HCC): ICD-10-CM

## 2021-09-14 PROCEDURE — 90694 VACC AIIV4 NO PRSRV 0.5ML IM: CPT | Performed by: INTERNAL MEDICINE

## 2021-09-14 PROCEDURE — 1123F ACP DISCUSS/DSCN MKR DOCD: CPT | Performed by: INTERNAL MEDICINE

## 2021-09-14 PROCEDURE — G8427 DOCREV CUR MEDS BY ELIG CLIN: HCPCS | Performed by: INTERNAL MEDICINE

## 2021-09-14 PROCEDURE — 4040F PNEUMOC VAC/ADMIN/RCVD: CPT | Performed by: INTERNAL MEDICINE

## 2021-09-14 PROCEDURE — 1036F TOBACCO NON-USER: CPT | Performed by: INTERNAL MEDICINE

## 2021-09-14 PROCEDURE — G0008 ADMIN INFLUENZA VIRUS VAC: HCPCS | Performed by: INTERNAL MEDICINE

## 2021-09-14 PROCEDURE — 1090F PRES/ABSN URINE INCON ASSESS: CPT | Performed by: INTERNAL MEDICINE

## 2021-09-14 PROCEDURE — G8417 CALC BMI ABV UP PARAM F/U: HCPCS | Performed by: INTERNAL MEDICINE

## 2021-09-14 PROCEDURE — 99214 OFFICE O/P EST MOD 30 MIN: CPT | Performed by: INTERNAL MEDICINE

## 2021-09-14 NOTE — PROGRESS NOTES
Patient: Karis Serrato is a 80 y.o. female who presents today with the following Chief Complaint(s):  Chief Complaint   Patient presents with    3 Month Follow-Up       HPI     Here today for follow up. Has not been feeling SOB or having CP but was noted to have oxygen sats of 83% on RA today after walking back from the waiting room (about 200 feet). Has been well. Denies swelling, CP, or palpitations. Repeat SPO2 96% RA, 97% RA with walking. Did not tolerate tramadol, made her very lightheaded x 2 weeks. If feeling better now. Feet have felt numb, just started 2 weeks ago- woke up and felt like her feet were swollen. No increase in pain. Left foot is worse than right foot. Has not affected her balance. Has an appointment to see ortho next week (Dr. Chikis Engle). HTN- no issues with blood pressures. No falls. HLD- no issues with pravastatin. Lab Results   Component Value Date    CHOL 185 04/05/2021    CHOL 182 07/29/2020    CHOL 162 12/19/2019     Lab Results   Component Value Date    TRIG 245 (H) 04/05/2021    TRIG 244 (H) 07/29/2020    TRIG 154 (H) 12/19/2019     Lab Results   Component Value Date    HDL 41 04/05/2021    HDL 43 07/29/2020    HDL 49 12/19/2019     Lab Results   Component Value Date    LDLCALC 95 04/05/2021    LDLCALC 90 07/29/2020    LDLCALC 82 12/19/2019     Lab Results   Component Value Date    LABVLDL 49 04/05/2021    LABVLDL 49 07/29/2020    LABVLDL 31 12/19/2019     No results found for: CHOLHDLRATIO  Labs Renal Latest Ref Rng & Units 9/14/2021 7/1/2021 6/4/2021 4/5/2021 7/29/2020   BUN 7 - 20 mg/dL 30(H) 32(H) 36(H) 35(H) 31(H)   Cr 0.6 - 1.2 mg/dL 1.0 0.9 1.0 1.0 0.8   K 3.5 - 5.1 mmol/L 4.3 3.7 4.1 3.8 4.9   Na 136 - 145 mmol/L 141 139 141 138 143     This SmartLink has not been configured with any valid records.      Lab Results   Component Value Date    WBC 8.2 09/14/2021    HGB 13.1 09/14/2021    HCT 38.7 09/14/2021    MCV 93.9 09/14/2021     09/14/2021     Results for orders placed or performed in visit on 09/09/21   POCT INR   Result Value Ref Range    INR 2.1     Protime          Allergies   Allergen Reactions    Ace Inhibitors      cough    Catapres [Clonidine Hcl]      patch    Diovan [Valsartan]      ineffective    Eszopiclone      ineffective    Morphine      All derivatives- GI upset    Tramadol Hcl      dizziness      Past Medical History:   Diagnosis Date    Arthritis     OA    Atrial fibrillation (HCC)     Blood transfusion     1975    Carpal tunnel syndrome     mild    Dizziness     Edema     Frequent falls     Glaucoma     [place] (hard of hearing)     bilateral hearing aids    Hx of cholelithiasis     Hyperlipidemia     Hypertension     Osteoarthrosis, unspecified whether generalized or localized, unspecified site     Postmenopausal atrophic vaginitis     Unspecified disorder of autonomic nervous system     Unspecified essential hypertension     Unspecified eustachian tube disorder     Unspecified glaucoma(365.9)     Unspecified hemorrhoids without mention of complication     Urethral stricture unspecified       Past Surgical History:   Procedure Laterality Date    COLONOSCOPY      ELBOW SURGERY      left I&D    EYE SURGERY      cataracts    HEMORRHOID SURGERY      HYSTERECTOMY      1 ovary left ;later oophorectomy     KNEE CARTILAGE SURGERY      LAMINECTOMY  1/19/12    L  3-4 bilateral laminectomy discectomy      Social History     Socioeconomic History    Marital status:      Spouse name: Not on file    Number of children: Not on file    Years of education: Not on file    Highest education level: Not on file   Occupational History    Not on file   Tobacco Use    Smoking status: Never Smoker    Smokeless tobacco: Never Used   Vaping Use    Vaping Use: Never used   Substance and Sexual Activity    Alcohol use:  Yes     Alcohol/week: 0.0 standard drinks     Comment: wine with meals occ    Drug use: No    Sexual activity: Not on file   Other Topics Concern    Not on file   Social History Narrative    3  siblings    9 children    3 sons live close by     Social Determinants of Health     Financial Resource Strain: Low Risk     Difficulty of Paying Living Expenses: Not hard at all   Food Insecurity: No Food Insecurity    Worried About Running Out of Food in the Last Year: Never true    [address] in the Last Year: Never true   Transportation Needs:     Lack of Transportation (Medical):      Lack of Transportation (Non-Medical):    Physical Activity:     Days of Exercise per Week:     Minutes of Exercise per Session:    Stress:     Feeling of Stress :    Social Connections:     Frequency of Communication with Friends and Family:     Frequency of Social Gatherings with Friends and Family:     Attends Restoration[employer] Services:     Active Member of Clubs or Organizations:     Attends Club or Organization Meetings:     Marital Status:    Intimate Partner Violence:     Fear of Current or Ex-Partner:     Emotionally Abused:     Physically Abused:     Sexually Abused:      Family History   Problem Relation Age of Onset    Diabetes Mother     High Blood Pressure Mother     Heart Disease Mother     Diabetes Brother     High Blood Pressure Brother     Heart Disease Brother     Cancer Brother     Rheum Arthritis Daughter         fibromayalgia    Cancer Brother         prostate cancer     Heart Attack Brother         Outpatient Medications Prior to Visit   Medication Sig Dispense Refill    potassium chloride (KLOR-CON SPRINKLE) 10 MEQ extended release capsule Take 1 capsule by mouth 2 times daily 180 capsule 3    lidocaine 4 % external patch Place 1 patch onto the skin daily as needed (pain) 30 patch 1    DULoxetine (CYMBALTA) 60 MG extended release capsule Take 1 capsule by mouth daily 90 capsule 3    Netarsudil-Latanoprost (ROCKLATAN) 0.02-0.005 % SOLN Place 1 drop into the left eye nightly       triamterene-hydroCHLOROthiazide (MAXZIDE) 75-50 MG per tablet TAKE 1 TABLET DAILY 90 tablet 3    pravastatin (PRAVACHOL) 10 MG tablet Take 1 tablet daily 90 tablet 3    warfarin (COUMADIN) 5 MG tablet 1 tablet daily 90 tablet 3    olmesartan (BENICAR) 20 MG tablet Take 1 tablet by mouth daily 90 tablet 3    amLODIPine (NORVASC) 5 MG tablet TAKE ONE TABLET BY MOUTH DAILY 90 tablet 3    diclofenac sodium (VOLTAREN) 1 % GEL Apply 4 g topically 4 times daily as needed for Pain 100 g 1    brimonidine (ALPHAGAN) 0.2 % ophthalmic solution Place 1 drop into both eyes 3 times daily       LUTEIN PO Take by mouth      dorzolamide (TRUSOPT) 2 % ophthalmic solution Place 1 drop into both eyes 3 times daily       acetaminophen (TYLENOL) 500 MG tablet Take 1,000 mg by mouth three times daily      Glucosamine-Chondroit-Vit C-Mn (GLUCOSAMINE CHONDR 500 COMPLEX PO) Take  by mouth.  MULTIPLE VITAMIN PO Take  by mouth. No facility-administered medications prior to visit. Patient'spast medical history, surgical history, family history, medications,  and allergies  were all reviewed and updated as appropriate today. Review of Systems   Constitutional: Negative for appetite change, fatigue and fever. Respiratory: Negative for chest tightness and shortness of breath. Cardiovascular: Negative for chest pain. Gastrointestinal: Negative for constipation and diarrhea. Musculoskeletal: Positive for back pain. Negative for gait problem. Skin: Negative for rash. Psychiatric/Behavioral: Negative for dysphoric mood. The patient is not nervous/anxious. /60   Pulse 72   Wt 178 lb 6.1 oz (80.9 kg)   SpO2 98% Comment: 2 liters  BMI 29.68 kg/m²   Physical Exam  Vitals and nursing note reviewed. Constitutional:       Appearance: She is well-developed. She is not toxic-appearing. HENT:      Head: Normocephalic.       Right Ear: Tympanic membrane, ear canal and external ear normal.      Left Ear: Tympanic membrane, ear canal and external ear normal.      Mouth/Throat:      Pharynx: No oropharyngeal exudate or posterior oropharyngeal erythema. Eyes:      General: No scleral icterus. Extraocular Movements: Extraocular movements intact. Conjunctiva/sclera: Conjunctivae normal.      Pupils: Pupils are equal, round, and reactive to light. Neck:      Thyroid: No thyroid mass or thyromegaly. Vascular: No carotid bruit. Cardiovascular:      Rate and Rhythm: Normal rate and regular rhythm. Heart sounds: Normal heart sounds. No murmur heard. Pulmonary:      Effort: Pulmonary effort is normal.      Breath sounds: Normal breath sounds. Musculoskeletal:      Right lower leg: No edema. Left lower leg: No edema. Lymphadenopathy:      Cervical: No cervical adenopathy. Neurological:      General: No focal deficit present. Mental Status: She is alert and oriented to person, place, and time. Psychiatric:         Mood and Affect: Mood normal.         Behavior: Behavior normal. Behavior is cooperative. ASSESSMENT/PLAN:    Problem List Items Addressed This Visit     Acute respiratory failure with hypoxia (HCC)     Repeat oxygen levels as well as repeat oxygen levels with activity on room air were normal.  No need for home oxygen at this time. Atrial fibrillation ([address]     On no medication for rate control due to bradycardia. Continue Coumadin for anticoagulation. Most recent INR was 2.1 on 9/9/2021. Essential hypertension     Well-controlled. Continue amlodipine 5 mg daily, Benicar 20 mg daily, and Maxide 75/50 mg daily. Relevant Orders    CBC Auto Differential (Completed)    BASIC METABOLIC PANEL (Completed)    HEPATIC FUNCTION PANEL (Completed)    Hyperlipidemia LDL goal <100      Controlled on pravastatin 10 mg daily. Continue.          Relevant Orders    HEPATIC FUNCTION PANEL (Completed)    Idiopathic peripheral neuropathy - Primary      Continue Cymbalta 60 mg daily. Did not tolerate previous attempts at weaning. Relevant Orders    VITAMIN B12 & FOLATE (Completed)    Spinal stenosis of lumbar region     Patient did not tolerate tramadol. Continue Tylenol only for pain control. Continue Cymbalta 60 mg daily for radicular pain. Consider referral to [facility] for possible injections but this will be complicated due to Coumadin. Current Outpatient Medications   Medication Sig Dispense Refill    potassium chloride (KLOR-CON SPRINKLE) 10 MEQ extended release capsule Take 1 capsule by mouth 2 times daily 180 capsule 3    lidocaine 4 % external patch Place 1 patch onto the skin daily as needed (pain) 30 patch 1    DULoxetine (CYMBALTA) 60 MG extended release capsule Take 1 capsule by mouth daily 90 capsule 3    Netarsudil-Latanoprost (ROCKLATAN) 0.02-0.005 % SOLN Place 1 drop into the left eye nightly       triamterene-hydroCHLOROthiazide (MAXZIDE) 75-50 MG per tablet TAKE 1 TABLET DAILY 90 tablet 3    pravastatin (PRAVACHOL) 10 MG tablet Take 1 tablet daily 90 tablet 3    warfarin (COUMADIN) 5 MG tablet 1 tablet daily 90 tablet 3    olmesartan (BENICAR) 20 MG tablet Take 1 tablet by mouth daily 90 tablet 3    amLODIPine (NORVASC) 5 MG tablet TAKE ONE TABLET BY MOUTH DAILY 90 tablet 3    diclofenac sodium (VOLTAREN) 1 % GEL Apply 4 g topically 4 times daily as needed for Pain 100 g 1    brimonidine (ALPHAGAN) 0.2 % ophthalmic solution Place 1 drop into both eyes 3 times daily       LUTEIN PO Take by mouth      dorzolamide (TRUSOPT) 2 % ophthalmic solution Place 1 drop into both eyes 3 times daily       acetaminophen (TYLENOL) 500 MG tablet Take 1,000 mg by mouth three times daily      Glucosamine-Chondroit-Vit C-Mn (GLUCOSAMINE CHONDR 500 COMPLEX PO) Take  by mouth.  MULTIPLE VITAMIN PO Take  by mouth.          No current facility-administered medications for this visit. Return in about 3 months (around 12/14/2021).

## 2021-09-20 ENCOUNTER — OFFICE VISIT (OUTPATIENT)
Dept: ORTHOPEDIC SURGERY | Age: 86
End: 2021-09-20
Payer: MEDICARE

## 2021-09-20 VITALS — WEIGHT: 178 LBS | BODY MASS INDEX: 29.66 KG/M2 | HEIGHT: 65 IN

## 2021-09-20 DIAGNOSIS — M25.562 LEFT KNEE PAIN, UNSPECIFIED CHRONICITY: Primary | ICD-10-CM

## 2021-09-20 DIAGNOSIS — M17.12 ARTHRITIS OF KNEE, LEFT: ICD-10-CM

## 2021-09-20 PROCEDURE — 1036F TOBACCO NON-USER: CPT | Performed by: ORTHOPAEDIC SURGERY

## 2021-09-20 PROCEDURE — G8417 CALC BMI ABV UP PARAM F/U: HCPCS | Performed by: ORTHOPAEDIC SURGERY

## 2021-09-20 PROCEDURE — 1090F PRES/ABSN URINE INCON ASSESS: CPT | Performed by: ORTHOPAEDIC SURGERY

## 2021-09-20 PROCEDURE — 4040F PNEUMOC VAC/ADMIN/RCVD: CPT | Performed by: ORTHOPAEDIC SURGERY

## 2021-09-20 PROCEDURE — 1123F ACP DISCUSS/DSCN MKR DOCD: CPT | Performed by: ORTHOPAEDIC SURGERY

## 2021-09-20 PROCEDURE — G8427 DOCREV CUR MEDS BY ELIG CLIN: HCPCS | Performed by: ORTHOPAEDIC SURGERY

## 2021-09-20 PROCEDURE — 99213 OFFICE O/P EST LOW 20 MIN: CPT | Performed by: ORTHOPAEDIC SURGERY

## 2021-09-20 PROCEDURE — 20610 DRAIN/INJ JOINT/BURSA W/O US: CPT | Performed by: ORTHOPAEDIC SURGERY

## 2021-09-20 RX ORDER — ROPIVACAINE HYDROCHLORIDE 5 MG/ML
30 INJECTION, SOLUTION EPIDURAL; INFILTRATION; PERINEURAL ONCE
Status: COMPLETED | OUTPATIENT
Start: 2021-09-20 | End: 2021-09-20

## 2021-09-20 RX ORDER — METHYLPREDNISOLONE ACETATE 40 MG/ML
40 INJECTION, SUSPENSION INTRA-ARTICULAR; INTRALESIONAL; INTRAMUSCULAR; SOFT TISSUE ONCE
Status: COMPLETED | OUTPATIENT
Start: 2021-09-20 | End: 2021-09-20

## 2021-09-20 RX ADMIN — METHYLPREDNISOLONE ACETATE 40 MG: 40 INJECTION, SUSPENSION INTRA-ARTICULAR; INTRALESIONAL; INTRAMUSCULAR; SOFT TISSUE at 16:10

## 2021-09-20 RX ADMIN — ROPIVACAINE HYDROCHLORIDE 30 ML: 5 INJECTION, SOLUTION EPIDURAL; INFILTRATION; PERINEURAL at 16:10

## 2021-09-20 NOTE — PROGRESS NOTES
This is a 80year-old patient who is otherwise quite healthy. She says for approximately a year she began worsening left knee pain. She denies catching swelling or giving way. She is really had no treatment for this. She is on Coumadin. ROS: Pertinent items are noted in HPI.     No notes on file    Past Medical History:  No date: Arthritis      Comment:  OA  No date: Atrial fibrillation (HCC)  No date: Blood transfusion      Comment:  1975  No date: Carpal tunnel syndrome      Comment:  mild  No date: Dizziness  No date: Edema  No date: Frequent falls  No date: Glaucoma  No date: [place] (hard of hearing)      Comment:  bilateral hearing aids  No date: Hx of cholelithiasis  No date: Hyperlipidemia  No date: Hypertension  No date: Osteoarthrosis, unspecified whether generalized or localized,   unspecified site  No date: Postmenopausal atrophic vaginitis  No date: Unspecified disorder of autonomic nervous system  No date: Unspecified essential hypertension  No date: Unspecified eustachian tube disorder  No date: Unspecified glaucoma(365.9)  No date: Unspecified hemorrhoids without mention of complication  No date: Urethral stricture unspecified     Past Surgical History:  No date: COLONOSCOPY  No date: ELBOW SURGERY      Comment:  left I&D  No date: EYE SURGERY      Comment:  cataracts  No date: HEMORRHOID SURGERY  No date: HYSTERECTOMY      Comment:  1 ovary left ;later oophorectomy   No date: KNEE CARTILAGE SURGERY  1/19/12: LAMINECTOMY      Comment:  L  3-4 bilateral laminectomy discectomy    Review of patient's family history indicates:  Problem: Diabetes      Relation: Mother          Age of Onset: (Not Specified)  Problem: High Blood Pressure      Relation: Mother          Age of Onset: (Not Specified)  Problem: Heart Disease      Relation: Mother          Age of Onset: (Not Specified)  Problem: Diabetes      Relation: Brother          Age of Onset: (Not Specified)  Problem: High Blood Pressure      Relation: Brother          Age of Onset: (Not Specified)  Problem: Heart Disease      Relation: Brother          Age of Onset: (Not Specified)  Problem: Cancer      Relation: Brother          Age of Onset: (Not Specified)  Problem: Rheum Arthritis      Relation: Daughter          Age of Onset: (Not Specified)          Comment: fibromayalgia  Problem: Cancer      Relation: Brother          Age of Onset: (Not Specified)          Comment: prostate cancer   Problem: Heart Attack      Relation: Brother          Age of Onset: (Not Specified)      Social History    Socioeconomic History      Marital status:       Spouse name: None      Number of children: None      Years of education: None      Highest education level: None    Occupational History      None    Tobacco Use      Smoking status: Never Smoker      Smokeless tobacco: Never Used    Vaping Use      Vaping Use: Never used    Substance and Sexual Activity      Alcohol use:  Yes        Alcohol/week: 0.0 standard drinks        Comment: wine with meals occ      Drug use: No      Sexual activity: None    Other Topics      Concerns:        None    Social History Narrative      3  siblings      7 children      3 sons live close by    Social Determinants of Health  Financial Resource Strain: Low Risk       Difficulty of Paying Living Expenses: Not hard at all  Food Insecurity: No Food Insecurity      Worried About [facility] in the Last Year: Never true      Ran Out of Food in the Last Year: Never true  Transportation Needs:       Lack of Transportation (Medical):       Lack of Transportation (Non-Medical):   Physical Activity:       Days of Exercise per Week:       Minutes of Exercise per Session:   Stress:       Feeling of Stress :   Social Connections:       Frequency of Communication with Friends and Family:       Frequency of Social Gatherings with Friends and Family:       Attends [employer] Services:       Active Member of Clubs or Organizations:       Attends Club or Organization Meetings:       Marital Status:   Intimate Partner Violence:       Fear of Current or Ex-Partner:       Emotionally Abused:       Physically Abused:       Sexually Abused:     Current Outpatient Medications:  potassium chloride (KLOR-CON SPRINKLE) 10 MEQ extended release capsule, Take 1 capsule by mouth 2 times daily, Disp: 180 capsule, Rfl: 3  lidocaine 4 % external patch, Place 1 patch onto the skin daily as needed (pain), Disp: 30 patch, Rfl: 1  DULoxetine (CYMBALTA) 60 MG extended release capsule, Take 1 capsule by mouth daily, Disp: 90 capsule, Rfl: 3  Netarsudil-Latanoprost (ROCKLATAN) 0.02-0.005 % SOLN, Place 1 drop into the left eye nightly , Disp: , Rfl:   triamterene-hydroCHLOROthiazide (MAXZIDE) 75-50 MG per tablet, TAKE 1 TABLET DAILY, Disp: 90 tablet, Rfl: 3  pravastatin (PRAVACHOL) 10 MG tablet, Take 1 tablet daily, Disp: 90 tablet, Rfl: 3  warfarin (COUMADIN) 5 MG tablet, 1 tablet daily, Disp: 90 tablet, Rfl: 3  olmesartan (BENICAR) 20 MG tablet, Take 1 tablet by mouth daily, Disp: 90 tablet, Rfl: 3  amLODIPine (NORVASC) 5 MG tablet, TAKE ONE TABLET BY MOUTH DAILY, Disp: 90 tablet, Rfl: 3  diclofenac sodium (VOLTAREN) 1 % GEL, Apply 4 g topically 4 times daily as needed for Pain, Disp: 100 g, Rfl: 1  brimonidine (ALPHAGAN) 0.2 % ophthalmic solution, Place 1 drop into both eyes 3 times daily , Disp: , Rfl:   LUTEIN PO, Take by mouth, Disp: , Rfl:   dorzolamide (TRUSOPT) 2 % ophthalmic solution, Place 1 drop into both eyes 3 times daily , Disp: , Rfl:   acetaminophen (TYLENOL) 500 MG tablet, Take 1,000 mg by mouth three times daily, Disp: , Rfl:   Glucosamine-Chondroit-Vit C-Mn (GLUCOSAMINE CHONDR 500 COMPLEX PO), Take  by mouth., Disp: , Rfl:   MULTIPLE VITAMIN PO, Take  by mouth.  , Disp: , Rfl:     No current facility-administered medications for this visit.        -- Ace Inhibitors     --  cough   -- Catapres [Clonidine Hcl]     --  patch   -- Diovan [Valsartan]     -- ineffective   -- Eszopiclone     --  ineffective   -- Morphine     --  All derivatives- GI upset   -- Tramadol Hcl     --  dizziness    VITAL SIGNS:  Ht 5' 5\" (1.651 m)   Wt 178 lb (80.7 kg)   BMI 29.62 kg/m²   On examination today of the left knee there is no warmth, erythema, or effusion. She lacks about 5 degrees of full extension and flexes to about 105 degrees. There is no obvious crepitus with range of motion. She has no medial or lateral retinacular tenderness. She does have some medial joint line tenderness. Kim's testing does not produce a clunk or shift. She has no lateral joint line tenderness. Her calf is soft she has negative Homans. She has no swelling of the left lower extremity. 3 views of the left knee were obtained today. Standing AP shows she has 85 to 90% medial joint space narrowing in both knees. Per sunrise view shows that she has marginal osteophytes both the medial and lateral sides of both patellas. She still has fairly good joint space in the patellofemoral joint however. 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 Plan: We discussed the options of steroid injection. We went over viscosupplementation. And patient would like to try a steroid injection. After sterile prep injected left knee with 80 mg of Depo-Medrol Medrol and 15 mg ropivacaine. The patient tolerated this procedure well. I told them to give it a couple of weeks and if she is not getting pain relief to return him will discuss again viscosupplementation. Obtaining history, doing physical exam injection. Going over a book to show what this was and reviewing x-rays with the patient and her son took approximately 35 minutes.

## 2021-09-20 NOTE — PROGRESS NOTES
Administrations This Visit     methylPREDNISolone acetate (DEPO-MEDROL) injection 40 mg     Admin Date  09/20/2021  16:10 Action  Given Dose  40 mg Route  IntraMUSCular Site  Knee Left Administered By  [facility]    Ordering Provider: Sea Mcdonald MD    NDC: 0371-1567-22    Lot#: [id number]    : [address] [facility].     Patient Supplied?: No          ropivacaine (NAROPIN) 0.5% injection 30 mL     Admin Date  09/20/2021  16:10 Action  Given Dose  30 mL Route  Epidural Site  Knee Left Administered By  [facility]    Ordering Provider: Sea Mcdonald MD    NDC: 50232-113-67    Lot#: [id number]    : [address] [facility]    Patient Supplied?: No

## 2021-09-26 PROBLEM — J96.01 ACUTE RESPIRATORY FAILURE WITH HYPOXIA (HCC): Status: ACTIVE | Noted: 2021-09-26

## 2021-09-26 ASSESSMENT — ENCOUNTER SYMPTOMS
CHEST TIGHTNESS: 0
CONSTIPATION: 0
DIARRHEA: 0
SHORTNESS OF BREATH: 0
BACK PAIN: 1

## 2021-09-26 NOTE — ASSESSMENT & PLAN NOTE
Patient did not tolerate tramadol. Continue Tylenol only for pain control. Continue Cymbalta 60 mg daily for radicular pain. Consider referral to [facility] for possible injections but this will be complicated due to Coumadin.

## 2021-09-26 NOTE — ASSESSMENT & PLAN NOTE
Repeat oxygen levels as well as repeat oxygen levels with activity on room air were normal.  No need for home oxygen at this time.

## 2021-10-12 ENCOUNTER — NURSE ONLY (OUTPATIENT)
Dept: INTERNAL MEDICINE CLINIC | Age: 86
End: 2021-10-12
Payer: MEDICARE

## 2021-10-12 DIAGNOSIS — Z79.01 ANTICOAGULATED ON COUMADIN: Primary | ICD-10-CM

## 2021-10-12 LAB
INTERNATIONAL NORMALIZATION RATIO, POC: 2.2
PROTHROMBIN TIME, POC: NORMAL

## 2021-10-12 PROCEDURE — 85610 PROTHROMBIN TIME: CPT | Performed by: INTERNAL MEDICINE

## 2021-10-14 ENCOUNTER — TELEPHONE (OUTPATIENT)
Dept: ORTHOPEDIC SURGERY | Age: 86
End: 2021-10-14

## 2021-10-14 ENCOUNTER — OFFICE VISIT (OUTPATIENT)
Dept: ENT CLINIC | Age: 86
End: 2021-10-14
Payer: MEDICARE

## 2021-10-14 VITALS — SYSTOLIC BLOOD PRESSURE: 161 MMHG | DIASTOLIC BLOOD PRESSURE: 76 MMHG | HEART RATE: 87 BPM | TEMPERATURE: 97.5 F

## 2021-10-14 DIAGNOSIS — T16.2XXA ACUTE FOREIGN BODY OF LEFT EAR CANAL, INITIAL ENCOUNTER: Primary | ICD-10-CM

## 2021-10-14 DIAGNOSIS — H60.332 ACUTE SWIMMER'S EAR OF LEFT SIDE: ICD-10-CM

## 2021-10-14 PROCEDURE — 69200 CLEAR OUTER EAR CANAL: CPT | Performed by: OTOLARYNGOLOGY

## 2021-10-14 RX ORDER — NEOMYCIN SULFATE, POLYMYXIN B SULFATE AND HYDROCORTISONE 10; 3.5; 1 MG/ML; MG/ML; [USP'U]/ML
4 SUSPENSION/ DROPS AURICULAR (OTIC) 3 TIMES DAILY
Qty: 10 ML | Refills: 0 | Status: SHIPPED | OUTPATIENT
Start: 2021-10-14 | End: 2021-10-24

## 2021-10-14 NOTE — TELEPHONE ENCOUNTER
General Question     Subject: QUESTIONS ABOUT DOWNTIME AFTER RECEIVING AN INJ IN THE KNEE/STATES SHE RECEIVE AN APPROVAL FROM HER INSURANCE   Patient and /or Facility Request: PATIENT  Contact Number: [phone] [address] [place] [address]  CAREGIVER Jaron Mcdonald [phone]

## 2021-10-14 NOTE — PROGRESS NOTES
[facility]       \"NEW\"  PATIENT VISIT   (ESTABLISHED, who has not been seen by a [facility] Otolaryngologist in over 3 years)       PCP:  Ramiro Nunez, [address]  Chief Complaint   Patient presents with    Ear Problem       HISTORY OF PRESENT ILLNESS       Suzy Meigs is a 80 y.o. female here for evaluation and treatment of a foreign body in the left ear canal.  Seen at [facility] for left ear pain and couldn't use the hearing aid and difficulty hearing. washed ear and got wax out but said there is something in her ear. OFFICE PROCEDURE - Removal of foreign body from right external auditory canal.    ANESTHESIA  None     DESCRIPTION OF PROCEDURE    Under the ear operating microscope, the foreign body was visualized in the right external auditory canal.  It was a cylindrical shaped plastic piece with the central lumen. Using an alligator forceps, the foreign body was grasped and removed atraumatically. There is no evidence of ear canal abrasion or tympanic membrane trauma. Tympanic membrane was mobile to pneumatic massage after removal of foreign body. Cherelle Xiao / Ely Starks / Eve Dire:   Henrry Valles was seen today for ear problem. Diagnoses and all orders for this visit:    Acute foreign body of left ear canal, initial encounter    Acute swimmer's ear of left side  -     neomycin-polymyxin-hydrocortisone (CORTISPORIN) 3.5-24403-4 otic suspension; Place 4 drops into the left ear 3 times daily for 10 days            RECOMMENDATIONS / PLAN:   1. See Patient Instructions. 2. Return for any further ENT or sinus problems or symptoms.

## 2021-10-14 NOTE — PATIENT INSTRUCTIONS
You may use acetaminophen (eg. Tylenol) or Ibuprofen (eg. Advil) (over the counter medications) as needed for fever or pain.

## 2021-10-25 ENCOUNTER — OFFICE VISIT (OUTPATIENT)
Dept: ORTHOPEDIC SURGERY | Age: 86
End: 2021-10-25
Payer: MEDICARE

## 2021-10-25 VITALS — HEIGHT: 65 IN | BODY MASS INDEX: 29.66 KG/M2 | WEIGHT: 178 LBS

## 2021-10-25 DIAGNOSIS — M17.12 ARTHRITIS OF KNEE, LEFT: Primary | ICD-10-CM

## 2021-10-25 PROCEDURE — 1123F ACP DISCUSS/DSCN MKR DOCD: CPT | Performed by: ORTHOPAEDIC SURGERY

## 2021-10-25 PROCEDURE — 20610 DRAIN/INJ JOINT/BURSA W/O US: CPT | Performed by: ORTHOPAEDIC SURGERY

## 2021-10-25 PROCEDURE — G8427 DOCREV CUR MEDS BY ELIG CLIN: HCPCS | Performed by: ORTHOPAEDIC SURGERY

## 2021-10-25 PROCEDURE — 1090F PRES/ABSN URINE INCON ASSESS: CPT | Performed by: ORTHOPAEDIC SURGERY

## 2021-10-25 PROCEDURE — 4040F PNEUMOC VAC/ADMIN/RCVD: CPT | Performed by: ORTHOPAEDIC SURGERY

## 2021-10-25 PROCEDURE — 1036F TOBACCO NON-USER: CPT | Performed by: ORTHOPAEDIC SURGERY

## 2021-10-25 PROCEDURE — G8417 CALC BMI ABV UP PARAM F/U: HCPCS | Performed by: ORTHOPAEDIC SURGERY

## 2021-10-25 PROCEDURE — G8484 FLU IMMUNIZE NO ADMIN: HCPCS | Performed by: ORTHOPAEDIC SURGERY

## 2021-10-25 NOTE — PROGRESS NOTES
Administrations This Visit     hyaluronate (MONOVISC) injection 88 mg     Admin Date  10/25/2021  11:11 Action  Given Dose  88 mg Route  Intra-artICUlar Site  Knee Left Administered By  [facility]    Ordering Provider: Elena Schaeffer MD    NDC: 76459-562-79    Lot#: [id number]    : [address]    Patient Supplied?: No

## 2021-10-25 NOTE — PROGRESS NOTES
Patient returns today for her first viscosupplementation. She has had steroid in the past which did not work much. She has known osteoarthritis of the left knee. ROS: Pertinent items are noted in HPI.     No notes on file    Past Medical History:  No date: Arthritis      Comment:  OA  No date: Atrial fibrillation (HCC)  No date: Blood transfusion      Comment:  1975  No date: Carpal tunnel syndrome      Comment:  mild  No date: Dizziness  No date: Edema  No date: Frequent falls  No date: Glaucoma  No date: [place] (hard of hearing)      Comment:  bilateral hearing aids  No date: Hx of cholelithiasis  No date: Hyperlipidemia  No date: Hypertension  No date: Osteoarthrosis, unspecified whether generalized or localized,   unspecified site  No date: Postmenopausal atrophic vaginitis  No date: Unspecified disorder of autonomic nervous system  No date: Unspecified essential hypertension  No date: Unspecified eustachian tube disorder  No date: Unspecified glaucoma(365.9)  No date: Unspecified hemorrhoids without mention of complication  No date: Urethral stricture unspecified     Past Surgical History:  No date: COLONOSCOPY  No date: ELBOW SURGERY      Comment:  left I&D  No date: EYE SURGERY      Comment:  cataracts  No date: HEMORRHOID SURGERY  No date: HYSTERECTOMY      Comment:  1 ovary left ;later oophorectomy   No date: KNEE CARTILAGE SURGERY  1/19/12: LAMINECTOMY      Comment:  L  3-4 bilateral laminectomy discectomy    Review of patient's family history indicates:  Problem: Diabetes      Relation: Mother          Age of Onset: (Not Specified)  Problem: High Blood Pressure      Relation: Mother          Age of Onset: (Not Specified)  Problem: Heart Disease      Relation: Mother          Age of Onset: (Not Specified)  Problem: Diabetes      Relation: Brother          Age of Onset: (Not Specified)  Problem: High Blood Pressure      Relation: Brother          Age of Onset: (Not Specified)  Problem: Heart Disease Relation: Brother          Age of Onset: (Not Specified)  Problem: Cancer      Relation: Brother          Age of Onset: (Not Specified)  Problem: Rheum Arthritis      Relation: Daughter          Age of Onset: (Not Specified)          Comment: fibromayalgia  Problem: Cancer      Relation: Brother          Age of Onset: (Not Specified)          Comment: prostate cancer   Problem: Heart Attack      Relation: Brother          Age of Onset: (Not Specified)      Social History    Socioeconomic History      Marital status:       Spouse name: None      Number of children: None      Years of education: None      Highest education level: None    Occupational History      None    Tobacco Use      Smoking status: Never Smoker      Smokeless tobacco: Never Used    Vaping Use      Vaping Use: Never used    Substance and Sexual Activity      Alcohol use:  Yes        Alcohol/week: 0.0 standard drinks        Comment: wine with meals occ      Drug use: No      Sexual activity: None    Other Topics      Concerns:        None    Social History Narrative      3  siblings      7 children      3 sons live close by    Social Determinants of Health  Financial Resource Strain: Low Risk       Difficulty of Paying Living Expenses: Not hard at all  Food Insecurity: No Food Insecurity      [place] [address] [facility] Last Year: Never true      Ran Out of Food in the Last Year: Never true  Transportation Needs:       Lack of Transportation (Medical):       Lack of Transportation (Non-Medical):   Physical Activity:       Days of Exercise per Week:       Minutes of Exercise per Session:   Stress:       Feeling of Stress :   Social Connections:       Frequency of Communication with Friends and Family:       Frequency of Social Gatherings with Friends and Family:       Attends Congregation Services:       Active Member of Clubs or Organizations:       Attends Club or Organization Meetings:       Marital Status:   Intimate Partner Violence:       Fear of Current or Ex-Partner:       Emotionally Abused:       Physically Abused:       Sexually Abused:     Current Outpatient Medications:  amLODIPine (NORVASC) 5 MG tablet, TAKE ONE TABLET BY MOUTH DAILY, Disp: 90 tablet, Rfl: 3  potassium chloride (KLOR-CON SPRINKLE) 10 MEQ extended release capsule, Take 1 capsule by mouth 2 times daily, Disp: 180 capsule, Rfl: 3  lidocaine 4 % external patch, Place 1 patch onto the skin daily as needed (pain), Disp: 30 patch, Rfl: 1  DULoxetine (CYMBALTA) 60 MG extended release capsule, Take 1 capsule by mouth daily, Disp: 90 capsule, Rfl: 3  Netarsudil-Latanoprost (ROCKLATAN) 0.02-0.005 % SOLN, Place 1 drop into the left eye nightly , Disp: , Rfl:   triamterene-hydroCHLOROthiazide (MAXZIDE) 75-50 MG per tablet, TAKE 1 TABLET DAILY, Disp: 90 tablet, Rfl: 3  pravastatin (PRAVACHOL) 10 MG tablet, Take 1 tablet daily, Disp: 90 tablet, Rfl: 3  warfarin (COUMADIN) 5 MG tablet, 1 tablet daily, Disp: 90 tablet, Rfl: 3  olmesartan (BENICAR) 20 MG tablet, Take 1 tablet by mouth daily, Disp: 90 tablet, Rfl: 3  diclofenac sodium (VOLTAREN) 1 % GEL, Apply 4 g topically 4 times daily as needed for Pain, Disp: 100 g, Rfl: 1  brimonidine (ALPHAGAN) 0.2 % ophthalmic solution, Place 1 drop into both eyes 3 times daily , Disp: , Rfl:   LUTEIN PO, Take by mouth, Disp: , Rfl:   dorzolamide (TRUSOPT) 2 % ophthalmic solution, Place 1 drop into both eyes 3 times daily , Disp: , Rfl:   acetaminophen (TYLENOL) 500 MG tablet, Take 1,000 mg by mouth three times daily, Disp: , Rfl:   Glucosamine-Chondroit-Vit C-Mn (GLUCOSAMINE CHONDR 500 COMPLEX PO), Take  by mouth., Disp: , Rfl:   MULTIPLE VITAMIN PO, Take  by mouth.  , Disp: , Rfl:     No current facility-administered medications for this visit.        -- Ace Inhibitors     --  cough   -- Catapres [Clonidine Hcl]     --  patch   -- Diovan [Valsartan]     --  ineffective   -- Eszopiclone     --  ineffective   -- Morphine     -- All derivatives- GI upset   -- Tramadol Hcl     --  dizziness    VITAL SIGNS:  Ht 5' 5\" (1.651 m)   Wt 178 lb (80.7 kg)   BMI 29.62 kg/m²   On examination today of the left knee she has no warmth, erythema, or effusion. After sterile prep we injected the left knee with 4 mL of Monovisc for osteoarthritis. Patient tolerated this procedure well. We will see her back in a month.

## 2021-11-08 ENCOUNTER — TELEPHONE (OUTPATIENT)
Dept: ORTHOPEDIC SURGERY | Age: 86
End: 2021-11-08

## 2021-11-08 ENCOUNTER — NURSE ONLY (OUTPATIENT)
Dept: INTERNAL MEDICINE CLINIC | Age: 86
End: 2021-11-08
Payer: MEDICARE

## 2021-11-08 ENCOUNTER — OFFICE VISIT (OUTPATIENT)
Dept: ORTHOPEDIC SURGERY | Age: 86
End: 2021-11-08
Payer: MEDICARE

## 2021-11-08 VITALS — BODY MASS INDEX: 29.66 KG/M2 | WEIGHT: 178 LBS | HEIGHT: 65 IN

## 2021-11-08 DIAGNOSIS — Z79.01 ANTICOAGULATED ON COUMADIN: Primary | ICD-10-CM

## 2021-11-08 DIAGNOSIS — M17.12 ARTHRITIS OF KNEE, LEFT: Primary | ICD-10-CM

## 2021-11-08 DIAGNOSIS — M25.062 HEMARTHROSIS, LEFT KNEE: ICD-10-CM

## 2021-11-08 LAB
INTERNATIONAL NORMALIZATION RATIO, POC: 2.4
PROTHROMBIN TIME, POC: NORMAL

## 2021-11-08 PROCEDURE — 85610 PROTHROMBIN TIME: CPT | Performed by: INTERNAL MEDICINE

## 2021-11-08 PROCEDURE — 20610 DRAIN/INJ JOINT/BURSA W/O US: CPT | Performed by: ORTHOPAEDIC SURGERY

## 2021-11-08 RX ORDER — ROPIVACAINE HYDROCHLORIDE 5 MG/ML
30 INJECTION, SOLUTION EPIDURAL; INFILTRATION; PERINEURAL ONCE
Status: COMPLETED | OUTPATIENT
Start: 2021-11-08 | End: 2021-11-08

## 2021-11-08 RX ADMIN — ROPIVACAINE HYDROCHLORIDE 30 ML: 5 INJECTION, SOLUTION EPIDURAL; INFILTRATION; PERINEURAL at 16:12

## 2021-11-08 NOTE — TELEPHONE ENCOUNTER
General Question     Subject: Patient advised she has developed large lumps in the spot that she had her cortisone injection requesting a call to discuss.   Patient Burt Abdi \"Deanna\"  Contact Number: [phone]

## 2021-11-08 NOTE — PROGRESS NOTES
Patient returns today with her daughter. About 2 weeks ago she had a Monovisc injection. She said it felt better for a while but then she started to feel like it was swollen and she describes as lumps when she points today she points to Gonzalez patella pouch area which today is not very swollen she does have some bulging at the lateral retinaculum and little bit inferior medial fat pad area. She says become more painful. She has had no fever chills or sweats. She is on Coumadin and her INR today was 2.4.    ROS: Pertinent items are noted in HPI.     No notes on file    Past Medical History:  No date: Arthritis      Comment:  OA  No date: Atrial fibrillation (HCC)  No date: Blood transfusion      Comment:  1975  No date: Carpal tunnel syndrome      Comment:  mild  No date: Dizziness  No date: Edema  No date: Frequent falls  No date: Glaucoma  No date: [place] (hard of hearing)      Comment:  bilateral hearing aids  No date: Hx of cholelithiasis  No date: Hyperlipidemia  No date: Hypertension  No date: Osteoarthrosis, unspecified whether generalized or localized,   unspecified site  No date: Postmenopausal atrophic vaginitis  No date: Unspecified disorder of autonomic nervous system  No date: Unspecified essential hypertension  No date: Unspecified eustachian tube disorder  No date: Unspecified glaucoma(365.9)  No date: Unspecified hemorrhoids without mention of complication  No date: Urethral stricture unspecified     Past Surgical History:  No date: COLONOSCOPY  No date: ELBOW SURGERY      Comment:  left I&D  No date: EYE SURGERY      Comment:  cataracts  No date: HEMORRHOID SURGERY  No date: HYSTERECTOMY      Comment:  1 ovary left ;later oophorectomy   No date: KNEE CARTILAGE SURGERY  1/19/12: LAMINECTOMY      Comment:  L  3-4 bilateral laminectomy discectomy    Review of patient's family history indicates:  Problem: Diabetes      Relation: Mother          Age of Onset: (Not Specified)  Problem: High Blood Pressure Relation: Mother          Age of Onset: (Not Specified)  Problem: Heart Disease      Relation: Mother          Age of Onset: (Not Specified)  Problem: Diabetes      Relation: Brother          Age of Onset: (Not Specified)  Problem: High Blood Pressure      Relation: Brother          Age of Onset: (Not Specified)  Problem: Heart Disease      Relation: Brother          Age of Onset: (Not Specified)  Problem: Cancer      Relation: Brother          Age of Onset: (Not Specified)  Problem: Rheum Arthritis      Relation: Daughter          Age of Onset: (Not Specified)          Comment: fibromayalgia  Problem: Cancer      Relation: Brother          Age of Onset: (Not Specified)          Comment: prostate cancer   Problem: Heart Attack      Relation: Brother          Age of Onset: (Not Specified)      Social History    Socioeconomic History      Marital status:       Spouse name: None      Number of children: None      Years of education: None      Highest education level: None    Occupational History      None    Tobacco Use      Smoking status: Never Smoker      Smokeless tobacco: Never Used    Vaping Use      Vaping Use: Never used    Substance and Sexual Activity      Alcohol use: Yes        Alcohol/week: 0.0 standard drinks        Comment: wine with meals occ      Drug use: No      Sexual activity: None    Other Topics      Concerns:        None    Social History Narrative      3  siblings      7 children      3 sons live close by    Social Determinants of Health  Financial Resource Strain: Low Risk       Difficulty of Paying Living Expenses: Not hard at all  Food Insecurity: No Food Insecurity      Worried About [address] [facility] in the Last Year: Never true      Ran Out of Food in the Last Year: Never true  Transportation Needs:       Lack of Transportation (Medical): Not on file      Lack of Transportation (Non-Medical):  Not on file  Physical Activity:       Days of Exercise per Week: Not on file      Minutes of Exercise per Session: Not on file  Stress:       Feeling of Stress : Not on file  Social Connections:       Frequency of Communication with Friends and Family: Not on file      Frequency of Social Gatherings with Friends and Family: Not on file      Attends Yazidi Services: Not on file      Active Member of [address] [place] [address] or Organizations: Not on file      Attends Club or Organization Meetings: Not on file      Marital Status: Not on file  Intimate Partner Violence:       Fear of Current or Ex-Partner: Not on file      Emotionally Abused: Not on file      Physically Abused: Not on file      Sexually Abused: Not on file  Housing Stability:       Unable to Pay for Housing in the Last Year: Not on file      Number of Places Lived in the Last Year: Not on file      Unstable Housing in the Last Year: Not on file    Current Outpatient Medications:  amLODIPine (NORVASC) 5 MG tablet, TAKE ONE TABLET BY MOUTH DAILY, Disp: 90 tablet, Rfl: 3  potassium chloride (KLOR-CON SPRINKLE) 10 MEQ extended release capsule, Take 1 capsule by mouth 2 times daily, Disp: 180 capsule, Rfl: 3  DULoxetine (CYMBALTA) 60 MG extended release capsule, Take 1 capsule by mouth daily, Disp: 90 capsule, Rfl: 3  Netarsudil-Latanoprost (ROCKLATAN) 0.02-0.005 % SOLN, Place 1 drop into the left eye nightly , Disp: , Rfl:   triamterene-hydroCHLOROthiazide (MAXZIDE) 75-50 MG per tablet, TAKE 1 TABLET DAILY, Disp: 90 tablet, Rfl: 3  pravastatin (PRAVACHOL) 10 MG tablet, Take 1 tablet daily, Disp: 90 tablet, Rfl: 3  warfarin (COUMADIN) 5 MG tablet, 1 tablet daily, Disp: 90 tablet, Rfl: 3  olmesartan (BENICAR) 20 MG tablet, Take 1 tablet by mouth daily, Disp: 90 tablet, Rfl: 3  brimonidine (ALPHAGAN) 0.2 % ophthalmic solution, Place 1 drop into both eyes 3 times daily , Disp: , Rfl:   LUTEIN PO, Take by mouth, Disp: , Rfl:   dorzolamide (TRUSOPT) 2 % ophthalmic solution, Place 1 drop into both eyes 3 times daily , Disp: , Rfl:   acetaminophen (TYLENOL) 500 MG tablet, Take 1,000 mg by mouth three times daily, Disp: , Rfl:   Glucosamine-Chondroit-Vit C-Mn (GLUCOSAMINE CHONDR 500 COMPLEX PO), Take  by mouth., Disp: , Rfl:   MULTIPLE VITAMIN PO, Take  by mouth.  , Disp: , Rfl:   lidocaine 4 % external patch, Place 1 patch onto the skin daily as needed (pain), Disp: 30 patch, Rfl: 1  diclofenac sodium (VOLTAREN) 1 % GEL, Apply 4 g topically 4 times daily as needed for Pain, Disp: 100 g, Rfl: 1    No current facility-administered medications for this visit. -- Ace Inhibitors     --  cough   -- Catapres [Clonidine Hcl]     --  patch   -- Diovan [Valsartan]     --  ineffective   -- Eszopiclone     --  ineffective   -- Morphine     --  All derivatives- GI upset   -- Tramadol Hcl     --  dizziness    VITAL SIGNS:  Ht 5' 5\" (1.651 m)   Wt 178 lb (80.7 kg)   BMI 29.62 kg/m²   On examination today there is some bulging of the lateral retinaculum but there is no warmth or erythema. She has some fluctuance under the lateral retinaculum but I would describe this only as a moderate effusion. She has some pain behind her knee but her calf appears soft and gives us as negative Homans. She has no obvious swelling the foot or ankle. Previous x-rays show she has varus osteoarthritis nearly bone-on-bone in both medial compartments. Pression effusion left knee. Plan: After sterile prep we aspirated the superior retinaculum 30 cc of serosanguineous fluid. This was clear. Pression hemarthrosis left knee. This did not have till 2 weeks after the injection so I suspect Coumadin contributed to this. We told her to try to get her in a compressive wrap and they do have a knee sleeve at home. Told to continue icing. We will see her back in 2 weeks. They should call for any worsening problems.

## 2021-11-08 NOTE — TELEPHONE ENCOUNTER
General Question     Subject: CAREGIVER IS CALLING STATING PATIENT IS HAVING DIFFICULTY WALKING - LEFT LEG SEEMS TO WANT TO GIVE OUT. CAREGIVER IS CONCERNED ABOUT LEAVING PATIENT ALONE FEARING SHE MAY FALL.     StefaniLinisabellasebastian Fish  Contact Number:[phone]

## 2021-11-08 NOTE — PROGRESS NOTES
Administrations This Visit     ropivacaine (NAROPIN) 0.5% injection 30 mL     Admin Date  11/08/2021  16:12 Action  Given Dose  30 mL Route  Epidural Site  Knee Left Administered By  John Ortiz    Ordering Provider: Nohemy Laws MD    NDC: 25063-751-24    Lot#: [id number]    : [facility]    Patient Supplied?: No    Comments: Aspiration Only            Aspiration Only

## 2021-11-22 ENCOUNTER — OFFICE VISIT (OUTPATIENT)
Dept: ORTHOPEDIC SURGERY | Age: 86
End: 2021-11-22
Payer: MEDICARE

## 2021-11-22 VITALS — HEIGHT: 65 IN | BODY MASS INDEX: 29.66 KG/M2 | WEIGHT: 178 LBS

## 2021-11-22 DIAGNOSIS — M17.12 ARTHRITIS OF KNEE, LEFT: Primary | ICD-10-CM

## 2021-11-22 DIAGNOSIS — M25.062 HEMARTHROSIS, LEFT KNEE: ICD-10-CM

## 2021-11-22 PROCEDURE — 4040F PNEUMOC VAC/ADMIN/RCVD: CPT | Performed by: ORTHOPAEDIC SURGERY

## 2021-11-22 PROCEDURE — 99212 OFFICE O/P EST SF 10 MIN: CPT | Performed by: ORTHOPAEDIC SURGERY

## 2021-11-22 PROCEDURE — G8417 CALC BMI ABV UP PARAM F/U: HCPCS | Performed by: ORTHOPAEDIC SURGERY

## 2021-11-22 PROCEDURE — 1123F ACP DISCUSS/DSCN MKR DOCD: CPT | Performed by: ORTHOPAEDIC SURGERY

## 2021-11-22 PROCEDURE — G8427 DOCREV CUR MEDS BY ELIG CLIN: HCPCS | Performed by: ORTHOPAEDIC SURGERY

## 2021-11-22 PROCEDURE — G8484 FLU IMMUNIZE NO ADMIN: HCPCS | Performed by: ORTHOPAEDIC SURGERY

## 2021-11-22 PROCEDURE — 1090F PRES/ABSN URINE INCON ASSESS: CPT | Performed by: ORTHOPAEDIC SURGERY

## 2021-11-22 PROCEDURE — 1036F TOBACCO NON-USER: CPT | Performed by: ORTHOPAEDIC SURGERY

## 2021-11-22 NOTE — PROGRESS NOTES
Patient returns with her daughter today. She is 80year-old with bilateral osteoarthritis pictures of the right and hurt her that much. We saw her about 2 weeks ago and aspirated about 30 cc of serosanguineous fluid. She is on Coumadin. This gave her some relief. Her daughter says that she could not walk at all even with a walker but now she is up and walking with a walker. ROS: Pertinent items are noted in HPI.     No notes on file    Past Medical History:  No date: Arthritis      Comment:  OA  No date: Atrial fibrillation (HCC)  No date: Blood transfusion      Comment:  1975  No date: Carpal tunnel syndrome      Comment:  mild  No date: Dizziness  No date: Edema  No date: Frequent falls  No date: Glaucoma  No date: [place] (hard of hearing)      Comment:  bilateral hearing aids  No date: Hx of cholelithiasis  No date: Hyperlipidemia  No date: Hypertension  No date: Osteoarthrosis, unspecified whether generalized or localized,   unspecified site  No date: Postmenopausal atrophic vaginitis  No date: Unspecified disorder of autonomic nervous system  No date: Unspecified essential hypertension  No date: Unspecified eustachian tube disorder  No date: Unspecified glaucoma(365.9)  No date: Unspecified hemorrhoids without mention of complication  No date: Urethral stricture unspecified     Past Surgical History:  No date: COLONOSCOPY  No date: ELBOW SURGERY      Comment:  left I&D  No date: EYE SURGERY      Comment:  cataracts  No date: HEMORRHOID SURGERY  No date: HYSTERECTOMY      Comment:  1 ovary left ;later oophorectomy   No date: KNEE CARTILAGE SURGERY  1/19/12: LAMINECTOMY      Comment:  L  3-4 bilateral laminectomy discectomy    Review of patient's family history indicates:  Problem: Diabetes      Relation: Mother          Age of Onset: (Not Specified)  Problem: High Blood Pressure      Relation: Mother          Age of Onset: (Not Specified)  Problem: Heart Disease      Relation: Mother          Age of Onset: (Not Specified)  Problem: Diabetes      Relation: Brother          Age of Onset: (Not Specified)  Problem: High Blood Pressure      Relation: Brother          Age of Onset: (Not Specified)  Problem: Heart Disease      Relation: Brother          Age of Onset: (Not Specified)  Problem: Cancer      Relation: Brother          Age of Onset: (Not Specified)  Problem: Rheum Arthritis      Relation: Daughter          Age of Onset: (Not Specified)          Comment: fibromayalgia  Problem: Cancer      Relation: Brother          Age of Onset: (Not Specified)          Comment: prostate cancer   Problem: Heart Attack      Relation: Brother          Age of Onset: (Not Specified)      Social History    Socioeconomic History      Marital status:       Spouse name: None      Number of children: None      Years of education: None      Highest education level: None    Occupational History      None    Tobacco Use      Smoking status: Never Smoker      Smokeless tobacco: Never Used    Vaping Use      Vaping Use: Never used    Substance and Sexual Activity      Alcohol use: Yes        Alcohol/week: 0.0 standard drinks        Comment: wine with meals occ      Drug use: No      Sexual activity: None    Other Topics      Concerns:        None    Social History Narrative      3  siblings      7 children      3 sons live close by    Social Determinants of Health  Financial Resource Strain: Low Risk       Difficulty of Paying Living Expenses: Not hard at all  Food Insecurity: No Food Insecurity      Worried About [address] [place] [address] in the Last Year: Never true      Ran Out of Food in the Last Year: Never true  Transportation Needs:       Lack of Transportation (Medical): Not on file      Lack of Transportation (Non-Medical):  Not on file  Physical Activity:       Days of Exercise per Week: Not on file      Minutes of Exercise per Session: Not on file  Stress:       Feeling of Stress : Not on file  Social Connections:       Frequency of Communication with Friends and Family: Not on file      Frequency of Social Gatherings with Friends and Family: Not on file      Attends [employer] Services: Not on file      Active Member of [address] [place] [address] or Organizations: Not on file      Attends Club or Organization Meetings: Not on file      Marital Status: Not on file  Intimate Partner Violence:       Fear of Current or Ex-Partner: Not on file      Emotionally Abused: Not on file      Physically Abused: Not on file      Sexually Abused: Not on file  Housing Stability:       Unable to Pay for Housing in the Last Year: Not on file      Number of Places Lived in the Last Year: Not on file      Unstable Housing in the Last Year: Not on file    Current Outpatient Medications:  amLODIPine (NORVASC) 5 MG tablet, TAKE ONE TABLET BY MOUTH DAILY, Disp: 90 tablet, Rfl: 3  potassium chloride (KLOR-CON SPRINKLE) 10 MEQ extended release capsule, Take 1 capsule by mouth 2 times daily, Disp: 180 capsule, Rfl: 3  DULoxetine (CYMBALTA) 60 MG extended release capsule, Take 1 capsule by mouth daily, Disp: 90 capsule, Rfl: 3  Netarsudil-Latanoprost (ROCKLATAN) 0.02-0.005 % SOLN, Place 1 drop into the left eye nightly , Disp: , Rfl:   triamterene-hydroCHLOROthiazide (MAXZIDE) 75-50 MG per tablet, TAKE 1 TABLET DAILY, Disp: 90 tablet, Rfl: 3  pravastatin (PRAVACHOL) 10 MG tablet, Take 1 tablet daily, Disp: 90 tablet, Rfl: 3  warfarin (COUMADIN) 5 MG tablet, 1 tablet daily, Disp: 90 tablet, Rfl: 3  olmesartan (BENICAR) 20 MG tablet, Take 1 tablet by mouth daily, Disp: 90 tablet, Rfl: 3  brimonidine (ALPHAGAN) 0.2 % ophthalmic solution, Place 1 drop into both eyes 3 times daily , Disp: , Rfl:   LUTEIN PO, Take by mouth, Disp: , Rfl:   dorzolamide (TRUSOPT) 2 % ophthalmic solution, Place 1 drop into both eyes 3 times daily , Disp: , Rfl:   acetaminophen (TYLENOL) 500 MG tablet, Take 1,000 mg by mouth three times daily, Disp: , Rfl:   Glucosamine-Chondroit-Vit C-Mn (GLUCOSAMINE CHONDR 500 COMPLEX PO), Take  by mouth., Disp: , Rfl:   MULTIPLE VITAMIN PO, Take  by mouth.  , Disp: , Rfl:   lidocaine 4 % external patch, Place 1 patch onto the skin daily as needed (pain), Disp: 30 patch, Rfl: 1  diclofenac sodium (VOLTAREN) 1 % GEL, Apply 4 g topically 4 times daily as needed for Pain, Disp: 100 g, Rfl: 1    No current facility-administered medications for this visit. -- Ace Inhibitors     --  cough   -- Catapres [Clonidine Hcl]     --  patch   -- Diovan [Valsartan]     --  ineffective   -- Eszopiclone     --  ineffective   -- Morphine     --  All derivatives- GI upset   -- Tramadol Hcl     --  dizziness    VITAL SIGNS:  Ht 5' 5\" (1.651 m)   Wt 178 lb (80.7 kg)   BMI 29.62 kg/m²   On examination today she lacks about 10 degrees of extension and flexes to about 110 degrees. There is no warmth, erythema, or effusion today. She is mostly tender medial joint line but may be the lateral joint line tenderness as well. She has no swelling lower extremity has a negative Homans and no pitting edema. Her previous x-rays show she has varus osteoarthritis in both knees and left is bone-on-bone in her right is nearly so. Impression end-stage osteoarthritis left knee. Plan: We again discussed the possibility of total knee arthroplasty. Patient just feels she is too old and does not wish to go through this at this time. She will contact his office if she feels she may wish to proceed with this.

## 2021-12-21 ENCOUNTER — NURSE ONLY (OUTPATIENT)
Dept: INTERNAL MEDICINE CLINIC | Age: 86
End: 2021-12-21
Payer: MEDICARE

## 2021-12-21 DIAGNOSIS — Z79.01 ANTICOAGULATED ON COUMADIN: Primary | ICD-10-CM

## 2021-12-21 PROCEDURE — 85610 PROTHROMBIN TIME: CPT | Performed by: INTERNAL MEDICINE

## 2021-12-22 LAB
INTERNATIONAL NORMALIZATION RATIO, POC: 2.5
PROTHROMBIN TIME, POC: NORMAL

## 2022-01-14 ENCOUNTER — OFFICE VISIT (OUTPATIENT)
Dept: INTERNAL MEDICINE CLINIC | Age: 87
End: 2022-01-14
Payer: MEDICARE

## 2022-01-14 VITALS
SYSTOLIC BLOOD PRESSURE: 120 MMHG | HEART RATE: 56 BPM | DIASTOLIC BLOOD PRESSURE: 60 MMHG | WEIGHT: 178 LBS | BODY MASS INDEX: 29.62 KG/M2

## 2022-01-14 DIAGNOSIS — M17.0 OSTEOARTHRITIS OF BOTH KNEES, UNSPECIFIED OSTEOARTHRITIS TYPE: ICD-10-CM

## 2022-01-14 DIAGNOSIS — I48.11 LONGSTANDING PERSISTENT ATRIAL FIBRILLATION (HCC): ICD-10-CM

## 2022-01-14 DIAGNOSIS — S51.001A OPEN WOUND OF RIGHT ELBOW, INITIAL ENCOUNTER: Primary | ICD-10-CM

## 2022-01-14 DIAGNOSIS — G60.9 IDIOPATHIC PERIPHERAL NEUROPATHY: ICD-10-CM

## 2022-01-14 PROCEDURE — G8427 DOCREV CUR MEDS BY ELIG CLIN: HCPCS | Performed by: INTERNAL MEDICINE

## 2022-01-14 PROCEDURE — 4040F PNEUMOC VAC/ADMIN/RCVD: CPT | Performed by: INTERNAL MEDICINE

## 2022-01-14 PROCEDURE — 1123F ACP DISCUSS/DSCN MKR DOCD: CPT | Performed by: INTERNAL MEDICINE

## 2022-01-14 PROCEDURE — 99213 OFFICE O/P EST LOW 20 MIN: CPT | Performed by: INTERNAL MEDICINE

## 2022-01-14 PROCEDURE — G8417 CALC BMI ABV UP PARAM F/U: HCPCS | Performed by: INTERNAL MEDICINE

## 2022-01-14 PROCEDURE — 1036F TOBACCO NON-USER: CPT | Performed by: INTERNAL MEDICINE

## 2022-01-14 PROCEDURE — G8484 FLU IMMUNIZE NO ADMIN: HCPCS | Performed by: INTERNAL MEDICINE

## 2022-01-14 PROCEDURE — 1090F PRES/ABSN URINE INCON ASSESS: CPT | Performed by: INTERNAL MEDICINE

## 2022-01-14 RX ORDER — DULOXETIN HYDROCHLORIDE 30 MG/1
30 CAPSULE, DELAYED RELEASE ORAL DAILY
Qty: 90 CAPSULE | Refills: 3 | Status: SHIPPED | OUTPATIENT
Start: 2022-01-14 | End: 2022-10-03

## 2022-01-14 RX ORDER — MUPIROCIN CALCIUM 20 MG/G
CREAM TOPICAL
Qty: 30 G | Refills: 1 | Status: SHIPPED | OUTPATIENT
Start: 2022-01-14 | End: 2022-02-13

## 2022-01-14 RX ORDER — DULOXETIN HYDROCHLORIDE 60 MG/1
60 CAPSULE, DELAYED RELEASE ORAL DAILY
Qty: 90 CAPSULE | Refills: 3 | Status: SHIPPED | OUTPATIENT
Start: 2022-01-14 | End: 2022-06-21

## 2022-01-14 NOTE — PROGRESS NOTES
Patient: Mohan Marmolejo is a 80 y.o. female who presents today with the following Chief Complaint(s):  No chief complaint on file. HPI     Here today for follow up. 1. Had episode where tip of hearing aide got stuck in her ear and became infected. Saw Dr. Catina Mcintyre who removed it and treated her with antibiotics. 2. Saw ortho (Dr. Nick Judd)- was given steroid injection in left knee in October. Did not help so then got a viscosuppmentation injection in her knee. Did help but slipped walking in our parking lot and had a traumatic effusion which was drained in November. Knee is doing ok now. Pain is not \"awful bad\" and Voltaren gel does help with her pain. 3. Neuropathy had been acting up. Increased Alpha Lipoic Acid from 600 mg to 900 mg daily and has helped with her neuropathy. 4. Eyes have been giving her problems. Is following with her ophthalmologist. Is now using a cream on her eyes. Using [facility] and Latanoprost for her eyes. 5. Was exposed to COVID by her aid just before Christmas. She did not get COVID! (fully vaccinated/boosted). Aid is ok and was able to return to patient on Monday. Is wondering if increasing Cymbalta would help with her knee and her neuropathy. Has a scab on her right elbow from bumping it repeatedly. Had pus. Family was able to clean her elbow.      Allergies   Allergen Reactions    Ace Inhibitors      cough    Catapres [Clonidine Hcl]      patch    Diovan [Valsartan]      ineffective    Eszopiclone      ineffective    Morphine      All derivatives- GI upset    Tramadol Hcl      dizziness      Past Medical History:   Diagnosis Date    Arthritis     OA    Atrial fibrillation (HCC)     Blood transfusion     1975    Carpal tunnel syndrome     mild    Dizziness     Edema     Frequent falls     Glaucoma     [place] (hard of hearing)     bilateral hearing aids    Hx of cholelithiasis     Hyperlipidemia     Hypertension     Osteoarthrosis, unspecified whether generalized or localized, unspecified site     Postmenopausal atrophic vaginitis     Unspecified disorder of autonomic nervous system     Unspecified essential hypertension     Unspecified eustachian tube disorder     Unspecified glaucoma(365.9)     Unspecified hemorrhoids without mention of complication     Urethral stricture unspecified       Past Surgical History:   Procedure Laterality Date    COLONOSCOPY      ELBOW SURGERY      left I&D    EYE SURGERY      cataracts    HEMORRHOID SURGERY      HYSTERECTOMY      1 ovary left ;later oophorectomy     KNEE CARTILAGE SURGERY      LAMINECTOMY  12    L  3-4 bilateral laminectomy discectomy      Social History     Socioeconomic History    Marital status:      Spouse name: Not on file    Number of children: Not on file    Years of education: Not on file    Highest education level: Not on file   Occupational History    Not on file   Tobacco Use    Smoking status: Never Smoker    Smokeless tobacco: Never Used   Vaping Use    Vaping Use: Never used   Substance and Sexual Activity    Alcohol use: Yes     Alcohol/week: 0.0 standard drinks     Comment: wine with meals occ    Drug use: No    Sexual activity: Not on file   Other Topics Concern    Not on file   Social History Narrative    3  siblings    7 children    3 sons live close by     Social Determinants of Health     Financial Resource Strain: Low Risk     Difficulty of Paying Living Expenses: Not hard at all   Food Insecurity: No Food Insecurity    Worried About Running Out of Food in the Last Year: Never true    [address] in the Last Year: Never true   Transportation Needs:     Lack of Transportation (Medical): Not on file    Lack of Transportation (Non-Medical):  Not on file   Physical Activity:     Days of Exercise per Week: Not on file    Minutes of Exercise per Session: Not on file   Stress:     Feeling of Stress : Not on file   Social Connections:     Frequency of Communication with Friends and Family: Not on file    Frequency of Social Gatherings with Friends and Family: Not on file    Attends Alevism Services: Not on file    Active Member of Clubs or Organizations: Not on file    Attends Club or Organization Meetings: Not on file    Marital Status: Not on file   Intimate Partner Violence:     Fear of Current or Ex-Partner: Not on file    Emotionally Abused: Not on file    Physically Abused: Not on file    Sexually Abused: Not on file   Housing Stability:     Unable to Pay for Housing in the Last Year: Not on file    Number of [place] in the Last Year: Not on file    Unstable Housing in the Last Year: Not on file     Family History   Problem Relation Age of Onset    Diabetes Mother     High Blood Pressure Mother     Heart Disease Mother     Diabetes Brother     High Blood Pressure Brother     Heart Disease Brother     Cancer Brother     Rheum Arthritis Daughter         fibromayalgia    Cancer Brother         prostate cancer     Heart Attack Brother         Outpatient Medications Prior to Visit   Medication Sig Dispense Refill    olmesartan (BENICAR) 20 MG tablet TAKE 1 TABLET EVERY DAY 90 tablet 0    amLODIPine (NORVASC) 5 MG tablet TAKE ONE TABLET BY MOUTH DAILY 90 tablet 3    potassium chloride (KLOR-CON SPRINKLE) 10 MEQ extended release capsule Take 1 capsule by mouth 2 times daily 180 capsule 3    triamterene-hydroCHLOROthiazide (MAXZIDE) 75-50 MG per tablet TAKE 1 TABLET DAILY 90 tablet 3    pravastatin (PRAVACHOL) 10 MG tablet Take 1 tablet daily 90 tablet 3    warfarin (COUMADIN) 5 MG tablet 1 tablet daily 90 tablet 3    brimonidine (ALPHAGAN) 0.2 % ophthalmic solution Place 1 drop into both eyes 3 times daily       LUTEIN PO Take by mouth      dorzolamide (TRUSOPT) 2 % ophthalmic solution Place 1 drop into both eyes 3 times daily       acetaminophen (TYLENOL) 500 MG tablet Take 1,000 mg by mouth three times daily      Glucosamine-Chondroit-Vit C-Mn (GLUCOSAMINE CHONDR 500 COMPLEX PO) Take  by mouth.  MULTIPLE VITAMIN PO Take  by mouth.  lidocaine 4 % external patch Place 1 patch onto the skin daily as needed (pain) 30 patch 1    DULoxetine (CYMBALTA) 60 MG extended release capsule Take 1 capsule by mouth daily 90 capsule 3    Netarsudil-Latanoprost (ROCKLATAN) 0.02-0.005 % SOLN Place 1 drop into the left eye nightly       diclofenac sodium (VOLTAREN) 1 % GEL Apply 4 g topically 4 times daily as needed for Pain 100 g 1     No facility-administered medications prior to visit. Patient'spast medical history, surgical history, family history, medications,  and allergies  were all reviewed and updated as appropriate today. Review of Systems   Constitutional: Negative for appetite change, fatigue and fever. Respiratory: Negative for chest tightness and shortness of breath. Cardiovascular: Negative for chest pain. Gastrointestinal: Negative for constipation and diarrhea. Skin: Negative for rash. /60   Pulse 56   Wt 178 lb (80.7 kg)   BMI 29.62 kg/m²   Physical Exam  Vitals and nursing note reviewed. Constitutional:       Appearance: She is well-developed. She is not toxic-appearing. HENT:      Head: Normocephalic. Right Ear: Tympanic membrane, ear canal and external ear normal.      Left Ear: Tympanic membrane, ear canal and external ear normal.      Mouth/Throat:      Pharynx: No oropharyngeal exudate or posterior oropharyngeal erythema. Eyes:      General: No scleral icterus. Extraocular Movements: Extraocular movements intact. Conjunctiva/sclera: Conjunctivae normal.      Pupils: Pupils are equal, round, and reactive to light. Neck:      Thyroid: No thyroid mass or thyromegaly. Vascular: No carotid bruit. Cardiovascular:      Rate and Rhythm: Normal rate and regular rhythm. Heart sounds: Normal heart sounds.  No murmur heard.      Pulmonary:      Effort: Pulmonary effort is normal.      Breath sounds: Normal breath sounds. Musculoskeletal:      Right lower leg: No edema. Left lower leg: No edema. Lymphadenopathy:      Cervical: No cervical adenopathy. Neurological:      General: No focal deficit present. Mental Status: She is alert and oriented to person, place, and time. Psychiatric:         Mood and Affect: Mood normal.         Behavior: Behavior normal. Behavior is cooperative. ASSESSMENT/PLAN:    Problem List Items Addressed This Visit     Osteoarthritis of both knees     Patient did see Dr. Sachin Pearson who injected her knee with cortisone injection in October and then Synvisc infection after she did not have improvement with the cortisone injection. Continue with Voltaren gel as needed. Open wound of right elbow - Primary     Does not appear to be infected. Elbow joint without evidence of infection. Wound culture obtained today. Treat empirically with Bactroban topically given history of pus oozing from wound. Relevant Medications    mupirocin (BACTROBAN) 2 % cream    Other Relevant Orders    Culture, Aerobic and Anaerobic    Idiopathic peripheral neuropathy      Increase Cymbalta to 90 mg daily. Relevant Medications    DULoxetine (CYMBALTA) 30 MG extended release capsule    DULoxetine (CYMBALTA) 60 MG extended release capsule    Atrial fibrillation (HCC)     On no medication for rate control due to bradycardia. Continue Coumadin for anticoagulation. Lab Results   Component Value Date    INR 2.5 12/22/2021    INR 2.4 11/08/2021    INR 2.2 10/12/2021    PROTIME 41.9 (H) 07/01/2021    PROTIME 27.3 (H) 03/04/2021    PROTIME 26.7 (H) 02/02/2021                    Current Outpatient Medications   Medication Sig Dispense Refill    DULoxetine (CYMBALTA) 30 MG extended release capsule Take 1 capsule by mouth daily Total dose 90 mg qd.  90 capsule 3    DULoxetine (CYMBALTA) 60 MG extended release capsule Take 1 capsule by mouth daily Total dose 90 mg qd 90 capsule 3    mupirocin (BACTROBAN) 2 % cream Apply 2 times daily until healed. 30 g 1    olmesartan (BENICAR) 20 MG tablet TAKE 1 TABLET EVERY DAY 90 tablet 0    amLODIPine (NORVASC) 5 MG tablet TAKE ONE TABLET BY MOUTH DAILY 90 tablet 3    potassium chloride (KLOR-CON SPRINKLE) 10 MEQ extended release capsule Take 1 capsule by mouth 2 times daily 180 capsule 3    triamterene-hydroCHLOROthiazide (MAXZIDE) 75-50 MG per tablet TAKE 1 TABLET DAILY 90 tablet 3    pravastatin (PRAVACHOL) 10 MG tablet Take 1 tablet daily 90 tablet 3    warfarin (COUMADIN) 5 MG tablet 1 tablet daily 90 tablet 3    brimonidine (ALPHAGAN) 0.2 % ophthalmic solution Place 1 drop into both eyes 3 times daily       LUTEIN PO Take by mouth      dorzolamide (TRUSOPT) 2 % ophthalmic solution Place 1 drop into both eyes 3 times daily       acetaminophen (TYLENOL) 500 MG tablet Take 1,000 mg by mouth three times daily      Glucosamine-Chondroit-Vit C-Mn (GLUCOSAMINE CHONDR 500 COMPLEX PO) Take  by mouth.  MULTIPLE VITAMIN PO Take  by mouth. No current facility-administered medications for this visit. Return in about 2 months (around 3/14/2022).

## 2022-01-16 PROBLEM — S51.001A OPEN WOUND OF RIGHT ELBOW: Status: ACTIVE | Noted: 2022-01-16

## 2022-01-16 ASSESSMENT — ENCOUNTER SYMPTOMS
SHORTNESS OF BREATH: 0
DIARRHEA: 0
CHEST TIGHTNESS: 0
CONSTIPATION: 0

## 2022-01-17 ENCOUNTER — TELEPHONE (OUTPATIENT)
Dept: INTERNAL MEDICINE CLINIC | Age: 87
End: 2022-01-17

## 2022-01-17 NOTE — TELEPHONE ENCOUNTER
Joao calling asking for a change in medications---you ordered the Mupirocin cream -it is over $200 with insurance---if you can order her the ointment instead is under $5---please call Jaison James at [phone]. Thanks.

## 2022-01-17 NOTE — ASSESSMENT & PLAN NOTE
Patient did see Dr. Blaine Shepherd who injected her knee with cortisone injection in October and then Synvisc infection after she did not have improvement with the cortisone injection. Continue with Voltaren gel as needed.

## 2022-01-17 NOTE — ASSESSMENT & PLAN NOTE
Does not appear to be infected. Elbow joint without evidence of infection. Wound culture obtained today. Treat empirically with Bactroban topically given history of pus oozing from wound.

## 2022-01-18 DIAGNOSIS — Z79.01 ANTICOAGULATED ON COUMADIN: ICD-10-CM

## 2022-01-18 LAB
INR BLD: 2.52 (ref 0.88–1.12)
PROTHROMBIN TIME: 29.6 SEC (ref 9.9–12.7)

## 2022-01-21 LAB
ANAEROBIC CULTURE: NORMAL
GRAM STAIN RESULT: NORMAL
WOUND/ABSCESS: NORMAL

## 2022-02-19 DIAGNOSIS — E78.5 HYPERLIPIDEMIA, UNSPECIFIED HYPERLIPIDEMIA TYPE: ICD-10-CM

## 2022-02-19 DIAGNOSIS — I10 ESSENTIAL HYPERTENSION: ICD-10-CM

## 2022-02-21 RX ORDER — PRAVASTATIN SODIUM 10 MG
TABLET ORAL
Qty: 90 TABLET | Refills: 3 | Status: SHIPPED | OUTPATIENT
Start: 2022-02-21

## 2022-02-21 RX ORDER — TRIAMTERENE AND HYDROCHLOROTHIAZIDE 75; 50 MG/1; MG/1
TABLET ORAL
Qty: 90 TABLET | Refills: 3 | Status: SHIPPED | OUTPATIENT
Start: 2022-02-21 | End: 2022-03-28 | Stop reason: DRUGHIGH

## 2022-02-21 RX ORDER — WARFARIN SODIUM 5 MG/1
TABLET ORAL
Qty: 90 TABLET | Refills: 3 | Status: SHIPPED | OUTPATIENT
Start: 2022-02-21

## 2022-02-28 ENCOUNTER — NURSE ONLY (OUTPATIENT)
Dept: INTERNAL MEDICINE CLINIC | Age: 87
End: 2022-02-28
Payer: MEDICARE

## 2022-02-28 DIAGNOSIS — Z79.01 ANTICOAGULATED ON COUMADIN: Primary | ICD-10-CM

## 2022-02-28 LAB
INTERNATIONAL NORMALIZATION RATIO, POC: 2.3
PROTHROMBIN TIME, POC: NORMAL

## 2022-02-28 PROCEDURE — 85610 PROTHROMBIN TIME: CPT | Performed by: INTERNAL MEDICINE

## 2022-03-04 RX ORDER — OLMESARTAN MEDOXOMIL 20 MG/1
TABLET ORAL
Qty: 90 TABLET | Refills: 3 | Status: SHIPPED | OUTPATIENT
Start: 2022-03-04

## 2022-03-04 NOTE — TELEPHONE ENCOUNTER
LOV 01/14/22 NOV 03/28/22      Ref Range & Units 4/5/21 1601 7/29/20 1440 12/19/19 0933 6/21/19 0830 9/6/17 0909 1/11/17 0909 1/7/16 0855    Cholesterol, Total 0 - 199 mg/dL 185  182  162  150  110 R  138 R  150 R    Triglycerides 0 - 150 mg/dL 245 High   244 High   154 High   199 High   103 R  130 R  156 High  R    HDL 40 - 60 mg/dL 41  43  49  39 Low   43 R  50 R  49 R, CM    LDL Calculated <100 mg/dL 95  90  82  71  46 R  62 R  70 R    VLDL Cholesterol Calculated Not Established mg/dL 49  49  31  40  21 R  26 R  31 R

## 2022-03-28 ENCOUNTER — OFFICE VISIT (OUTPATIENT)
Dept: INTERNAL MEDICINE CLINIC | Age: 87
End: 2022-03-28
Payer: MEDICARE

## 2022-03-28 VITALS
OXYGEN SATURATION: 94 % | HEART RATE: 76 BPM | WEIGHT: 181 LBS | BODY MASS INDEX: 30.12 KG/M2 | DIASTOLIC BLOOD PRESSURE: 58 MMHG | SYSTOLIC BLOOD PRESSURE: 118 MMHG

## 2022-03-28 DIAGNOSIS — R60.0 LOWER EXTREMITY EDEMA: ICD-10-CM

## 2022-03-28 DIAGNOSIS — E78.5 HYPERLIPIDEMIA LDL GOAL <100: ICD-10-CM

## 2022-03-28 DIAGNOSIS — G60.9 IDIOPATHIC PERIPHERAL NEUROPATHY: Primary | ICD-10-CM

## 2022-03-28 DIAGNOSIS — Z79.01 ANTICOAGULATED ON COUMADIN: ICD-10-CM

## 2022-03-28 DIAGNOSIS — I10 ESSENTIAL HYPERTENSION: ICD-10-CM

## 2022-03-28 DIAGNOSIS — I87.2 VENOUS INSUFFICIENCY: ICD-10-CM

## 2022-03-28 LAB
BASOPHILS ABSOLUTE: 0.1 K/UL (ref 0–0.2)
BASOPHILS RELATIVE PERCENT: 0.8 %
EOSINOPHILS ABSOLUTE: 0.4 K/UL (ref 0–0.6)
EOSINOPHILS RELATIVE PERCENT: 5 %
HCT VFR BLD CALC: 38.1 % (ref 36–48)
HEMOGLOBIN: 12.8 G/DL (ref 12–16)
INTERNATIONAL NORMALIZATION RATIO, POC: 3
LYMPHOCYTES ABSOLUTE: 2.3 K/UL (ref 1–5.1)
LYMPHOCYTES RELATIVE PERCENT: 26.8 %
MCH RBC QN AUTO: 31.3 PG (ref 26–34)
MCHC RBC AUTO-ENTMCNC: 33.5 G/DL (ref 31–36)
MCV RBC AUTO: 93.4 FL (ref 80–100)
MONOCYTES ABSOLUTE: 0.8 K/UL (ref 0–1.3)
MONOCYTES RELATIVE PERCENT: 9.2 %
NEUTROPHILS ABSOLUTE: 5 K/UL (ref 1.7–7.7)
NEUTROPHILS RELATIVE PERCENT: 58.2 %
PDW BLD-RTO: 13.7 % (ref 12.4–15.4)
PLATELET # BLD: 214 K/UL (ref 135–450)
PMV BLD AUTO: 9.6 FL (ref 5–10.5)
PROTHROMBIN TIME, POC: NORMAL
RBC # BLD: 4.08 M/UL (ref 4–5.2)
WBC # BLD: 8.5 K/UL (ref 4–11)

## 2022-03-28 PROCEDURE — G8417 CALC BMI ABV UP PARAM F/U: HCPCS | Performed by: INTERNAL MEDICINE

## 2022-03-28 PROCEDURE — 4040F PNEUMOC VAC/ADMIN/RCVD: CPT | Performed by: INTERNAL MEDICINE

## 2022-03-28 PROCEDURE — 1123F ACP DISCUSS/DSCN MKR DOCD: CPT | Performed by: INTERNAL MEDICINE

## 2022-03-28 PROCEDURE — 1090F PRES/ABSN URINE INCON ASSESS: CPT | Performed by: INTERNAL MEDICINE

## 2022-03-28 PROCEDURE — G8427 DOCREV CUR MEDS BY ELIG CLIN: HCPCS | Performed by: INTERNAL MEDICINE

## 2022-03-28 PROCEDURE — 99214 OFFICE O/P EST MOD 30 MIN: CPT | Performed by: INTERNAL MEDICINE

## 2022-03-28 PROCEDURE — 85610 PROTHROMBIN TIME: CPT | Performed by: INTERNAL MEDICINE

## 2022-03-28 PROCEDURE — 1036F TOBACCO NON-USER: CPT | Performed by: INTERNAL MEDICINE

## 2022-03-28 PROCEDURE — G8484 FLU IMMUNIZE NO ADMIN: HCPCS | Performed by: INTERNAL MEDICINE

## 2022-03-28 RX ORDER — TRIAMTERENE AND HYDROCHLOROTHIAZIDE 75; 50 MG/1; MG/1
TABLET ORAL
Qty: 90 TABLET | Refills: 3 | Status: SHIPPED
Start: 2022-03-28

## 2022-03-28 NOTE — PATIENT INSTRUCTIONS
Please come back on Monday to have repeat INR. Try using [employer] (hand warmers)- you may order a box of 20 on [employer] for about $9.     Try taking triamterene/HCT on an as needed basis. If you do not feel that it is helping with your swelling you do not need to take it. If you stop it and your swelling gets worse than you know that triamterene/HCT is helping and you may continue to take it. If you do not take your triamterene, you may not need to take your potassium pill. Amlodipine is also contributing to your swelling. This medicine works by causing the blood vessels in your legs to dilate, allowing for swelling.

## 2022-03-28 NOTE — PROGRESS NOTES
Patient: Maria Del Rosario Wells is a 80 y.o. female who presents today with the following Chief Complaint(s):  Chief Complaint   Patient presents with   [address] [place] [address] Office Visit for Anticoagulation Management     INR 3.0       HPI     Here today for follow up. Had laser surgery for glaucoma in December which has finally been leveled out. Has been changing eye drops. INR 3.0= on Coumadin 2.5 mg x 2 days and 5 mg x 5 days. 1. Has a lounge chair that she sleeps in d/t knee pain. The remote broke about 1 week ago. Has since been elevating her legs on a stool (but not as much as the recliner elevates her legs). Noticed that her feet and calves were very swollen. Does have a repair in process for her recliner. Swelling is better with increased elevation. Does have open-toed compression stockings with zippers but caused swelling in her toes. Unable to get compression stockings on without assistance. 2. Neuropathy is not too painful. Does have coldness in her calves and feet. Is taking B-12.      3. Hands are cold and numb. So numb that she cannot hold a fork at times. 4. Using Voltaren gel on knees and feet which is helpful. Does try to walk 3 laps around her apartment daily. 5. BP has been running low at home.    Allergies   Allergen Reactions    Ace Inhibitors      cough    Catapres [Clonidine Hcl]      patch    Diovan [Valsartan]      ineffective    Eszopiclone      ineffective    Morphine      All derivatives- GI upset    Tramadol Hcl      dizziness      Past Medical History:   Diagnosis Date    Arthritis     OA    Atrial fibrillation (HCC)     Blood transfusion     1975    Carpal tunnel syndrome     mild    Dizziness     Edema     Frequent falls     Glaucoma     [place] (hard of hearing)     bilateral hearing aids    Hx of cholelithiasis     Hyperlipidemia     Hypertension     Osteoarthrosis, unspecified whether generalized or localized, unspecified site     Postmenopausal atrophic vaginitis     Unspecified disorder of autonomic nervous system     Unspecified essential hypertension     Unspecified eustachian tube disorder     Unspecified glaucoma(365.9)     Unspecified hemorrhoids without mention of complication     Urethral stricture unspecified       Past Surgical History:   Procedure Laterality Date    COLONOSCOPY      ELBOW SURGERY      left I&D    EYE SURGERY      cataracts    HEMORRHOID SURGERY      HYSTERECTOMY      1 ovary left ;later oophorectomy     KNEE CARTILAGE SURGERY      LAMINECTOMY  12    L  3-4 bilateral laminectomy discectomy      Social History     Socioeconomic History    Marital status:      Spouse name: Not on file    Number of children: Not on file    Years of education: Not on file    Highest education level: Not on file   Occupational History    Not on file   Tobacco Use    Smoking status: Never Smoker    Smokeless tobacco: Never Used   Vaping Use    Vaping Use: Never used   Substance and Sexual Activity    Alcohol use: Yes     Alcohol/week: 0.0 standard drinks     Comment: wine with meals occ    Drug use: No    Sexual activity: Not on file   Other Topics Concern    Not on file   Social History Narrative    3  siblings    7 children    3 sons live close by     Social Determinants of Health     Financial Resource Strain: Low Risk     Difficulty of Paying Living Expenses: Not hard at all   Food Insecurity: No Food Insecurity    Worried About Running Out of Food in the Last Year: Never true    [address] in the Last Year: Never true   Transportation Needs:     Lack of Transportation (Medical): Not on file    Lack of Transportation (Non-Medical):  Not on file   Physical Activity:     Days of Exercise per Week: Not on file    Minutes of Exercise per Session: Not on file   Stress:     Feeling of Stress : Not on file   Social Connections:     Frequency of Communication with Friends and Family: Not on file    Frequency of Social Gatherings with Friends and Family: Not on file    Attends Pentecostalism Services: Not on file    Active Member of Clubs or Organizations: Not on file    Attends Club or Organization Meetings: Not on file    Marital Status: Not on file   Intimate Partner Violence:     Fear of Current or Ex-Partner: Not on file    Emotionally Abused: Not on file    Physically Abused: Not on file    Sexually Abused: Not on file   Housing Stability:     Unable to Pay for Housing in the Last Year: Not on file    Number of Places Lived in the Last Year: Not on file    Unstable Housing in the Last Year: Not on file     Family History   Problem Relation Age of Onset    Diabetes Mother     High Blood Pressure Mother     Heart Disease Mother     Diabetes Brother     High Blood Pressure Brother     Heart Disease Brother     Cancer Brother     Rheum Arthritis Daughter         fibromayalgia    Cancer Brother         prostate cancer     Heart Attack Brother         Outpatient Medications Prior to Visit   Medication Sig Dispense Refill    olmesartan (BENICAR) 20 MG tablet TAKE 1 TABLET EVERY DAY 90 tablet 3    pravastatin (PRAVACHOL) 10 MG tablet TAKE 1 TABLET EVERY DAY 90 tablet 3    warfarin (COUMADIN) 5 MG tablet TAKE 1 TABLET EVERY DAY 90 tablet 3    DULoxetine (CYMBALTA) 30 MG extended release capsule Take 1 capsule by mouth daily Total dose 90 mg qd.  90 capsule 3    DULoxetine (CYMBALTA) 60 MG extended release capsule Take 1 capsule by mouth daily Total dose 90 mg qd 90 capsule 3    amLODIPine (NORVASC) 5 MG tablet TAKE ONE TABLET BY MOUTH DAILY 90 tablet 3    potassium chloride (KLOR-CON SPRINKLE) 10 MEQ extended release capsule Take 1 capsule by mouth 2 times daily 180 capsule 3    brimonidine (ALPHAGAN) 0.2 % ophthalmic solution Place 1 drop into both eyes 3 times daily       LUTEIN PO Take by mouth      dorzolamide (TRUSOPT) 2 % ophthalmic solution Place 1 drop into both eyes 3 times daily  acetaminophen (TYLENOL) 500 MG tablet Take 1,000 mg by mouth three times daily      Glucosamine-Chondroit-Vit C-Mn (GLUCOSAMINE CHONDR 500 COMPLEX PO) Take  by mouth.  MULTIPLE VITAMIN PO Take  by mouth.  triamterene-hydroCHLOROthiazide (MAXZIDE) 75-50 MG per tablet TAKE 1 TABLET EVERY DAY 90 tablet 3     No facility-administered medications prior to visit. Patient'spast medical history, surgical history, family history, medications,  and allergies  were all reviewed and updated as appropriate today. Review of Systems   Constitutional: Negative for appetite change, fatigue and fever. Respiratory: Negative for chest tightness and shortness of breath. Cardiovascular: Negative for chest pain. Gastrointestinal: Negative for constipation and diarrhea. Skin: Negative for rash. Neurological: Negative for light-headedness. BP (!) 118/58   Pulse 76   Wt 181 lb (82.1 kg)   SpO2 94%   BMI 30.12 kg/m²   Physical Exam  Vitals and nursing note reviewed. Constitutional:       Appearance: She is well-developed. She is not toxic-appearing. HENT:      Head: Normocephalic. Right Ear: Tympanic membrane, ear canal and external ear normal.      Left Ear: Tympanic membrane, ear canal and external ear normal.      Mouth/Throat:      Pharynx: No oropharyngeal exudate or posterior oropharyngeal erythema. Eyes:      General: No scleral icterus. Extraocular Movements: Extraocular movements intact. Conjunctiva/sclera: Conjunctivae normal.      Pupils: Pupils are equal, round, and reactive to light. Neck:      Thyroid: No thyroid mass or thyromegaly. Vascular: No carotid bruit. Cardiovascular:      Rate and Rhythm: Normal rate and regular rhythm. Heart sounds: Normal heart sounds. No murmur heard. Pulmonary:      Effort: Pulmonary effort is normal.      Breath sounds: Normal breath sounds. Musculoskeletal:      Right lower leg: No edema.       Left lower leg: No edema. Lymphadenopathy:      Cervical: No cervical adenopathy. Neurological:      General: No focal deficit present. Mental Status: She is alert and oriented to person, place, and time. Psychiatric:         Mood and Affect: Mood normal.         Behavior: Behavior normal. Behavior is cooperative. ASSESSMENT/PLAN:    Problem List Items Addressed This Visit     Essential hypertension     Well-controlled. Continue amlodipine 5 mg daily and Benicar 20 mg daily. Change Maxide to as needed for swelling. advised patient that amlodipine may contribute to swelling. Advised patient that this is not dangerous. Relevant Medications    triamterene-hydroCHLOROthiazide (MAXZIDE) 75-50 MG per tablet    Other Relevant Orders    Comprehensive Metabolic Panel (Completed)    Hyperlipidemia LDL goal <100      Controlled on pravastatin 10 mg daily. Continue. Check lipid panel, CMP. Relevant Medications    triamterene-hydroCHLOROthiazide (MAXZIDE) 75-50 MG per tablet    Other Relevant Orders    Comprehensive Metabolic Panel (Completed)    Lipid Panel (Completed)    Idiopathic peripheral neuropathy - Primary     Better on Cymbalta 90 mg daily. Does not like gabapentin as it makes her thinking foggy. Recommend use of hand warmers to help with hand and foot coldness. Lower extremity edema       Advised patient that swelling worsened by amlodipine but it is not dangerous unless it is bothersome to her. Change Maxide to as needed. Venous insufficiency      Advised patient that swelling worsened by amlodipine but it is not dangerous unless it is bothersome to her. Change Maxide to as needed.            Other Visit Diagnoses     Anticoagulated on Coumadin        Relevant Orders    POCT INR (Completed)    CBC with Auto Differential (Completed)          Current Outpatient Medications   Medication Sig Dispense Refill    triamterene-hydroCHLOROthiazide (MAXZIDE) 75-50 MG per tablet 1 po qd prn edema 90 tablet 3    olmesartan (BENICAR) 20 MG tablet TAKE 1 TABLET EVERY DAY 90 tablet 3    pravastatin (PRAVACHOL) 10 MG tablet TAKE 1 TABLET EVERY DAY 90 tablet 3    warfarin (COUMADIN) 5 MG tablet TAKE 1 TABLET EVERY DAY 90 tablet 3    DULoxetine (CYMBALTA) 30 MG extended release capsule Take 1 capsule by mouth daily Total dose 90 mg qd. 90 capsule 3    DULoxetine (CYMBALTA) 60 MG extended release capsule Take 1 capsule by mouth daily Total dose 90 mg qd 90 capsule 3    amLODIPine (NORVASC) 5 MG tablet TAKE ONE TABLET BY MOUTH DAILY 90 tablet 3    potassium chloride (KLOR-CON SPRINKLE) 10 MEQ extended release capsule Take 1 capsule by mouth 2 times daily 180 capsule 3    brimonidine (ALPHAGAN) 0.2 % ophthalmic solution Place 1 drop into both eyes 3 times daily       LUTEIN PO Take by mouth      dorzolamide (TRUSOPT) 2 % ophthalmic solution Place 1 drop into both eyes 3 times daily       acetaminophen (TYLENOL) 500 MG tablet Take 1,000 mg by mouth three times daily      Glucosamine-Chondroit-Vit C-Mn (GLUCOSAMINE CHONDR 500 COMPLEX PO) Take  by mouth.  MULTIPLE VITAMIN PO Take  by mouth. No current facility-administered medications for this visit. Return in about 3 months (around 6/28/2022).

## 2022-03-29 LAB
A/G RATIO: 1.4 (ref 1.1–2.2)
ALBUMIN SERPL-MCNC: 4.5 G/DL (ref 3.4–5)
ALP BLD-CCNC: 97 U/L (ref 40–129)
ALT SERPL-CCNC: 15 U/L (ref 10–40)
ANION GAP SERPL CALCULATED.3IONS-SCNC: 21 MMOL/L (ref 3–16)
AST SERPL-CCNC: 20 U/L (ref 15–37)
BILIRUB SERPL-MCNC: 0.4 MG/DL (ref 0–1)
BUN BLDV-MCNC: 29 MG/DL (ref 7–20)
CALCIUM SERPL-MCNC: 10.6 MG/DL (ref 8.3–10.6)
CHLORIDE BLD-SCNC: 107 MMOL/L (ref 99–110)
CHOLESTEROL, TOTAL: 160 MG/DL (ref 0–199)
CO2: 19 MMOL/L (ref 21–32)
CREAT SERPL-MCNC: 1 MG/DL (ref 0.6–1.2)
GFR AFRICAN AMERICAN: >60
GFR NON-AFRICAN AMERICAN: 52
GLUCOSE BLD-MCNC: 95 MG/DL (ref 70–99)
HDLC SERPL-MCNC: 46 MG/DL (ref 40–60)
LDL CHOLESTEROL CALCULATED: 76 MG/DL
POTASSIUM SERPL-SCNC: 4.1 MMOL/L (ref 3.5–5.1)
SODIUM BLD-SCNC: 147 MMOL/L (ref 136–145)
TOTAL PROTEIN: 7.7 G/DL (ref 6.4–8.2)
TRIGL SERPL-MCNC: 190 MG/DL (ref 0–150)
VLDLC SERPL CALC-MCNC: 38 MG/DL

## 2022-04-03 ASSESSMENT — ENCOUNTER SYMPTOMS
CONSTIPATION: 0
DIARRHEA: 0
SHORTNESS OF BREATH: 0
CHEST TIGHTNESS: 0

## 2022-04-03 NOTE — ASSESSMENT & PLAN NOTE
Advised patient that swelling worsened by amlodipine but it is not dangerous unless it is bothersome to her. Change Maxide to as needed.

## 2022-04-03 NOTE — ASSESSMENT & PLAN NOTE
Better on Cymbalta 90 mg daily. Does not like gabapentin as it makes her thinking foggy. Recommend use of hand warmers to help with hand and foot coldness.

## 2022-04-03 NOTE — ASSESSMENT & PLAN NOTE
Well-controlled. Continue amlodipine 5 mg daily and Benicar 20 mg daily. Change Maxide to as needed for swelling. advised patient that amlodipine may contribute to swelling. Advised patient that this is not dangerous.

## 2022-04-04 ENCOUNTER — NURSE ONLY (OUTPATIENT)
Dept: INTERNAL MEDICINE CLINIC | Age: 87
End: 2022-04-04
Payer: MEDICARE

## 2022-04-04 DIAGNOSIS — Z79.01 ANTICOAGULATED ON COUMADIN: Primary | ICD-10-CM

## 2022-04-04 LAB
INTERNATIONAL NORMALIZATION RATIO, POC: 2.8
PROTHROMBIN TIME, POC: NORMAL

## 2022-04-04 PROCEDURE — 85610 PROTHROMBIN TIME: CPT | Performed by: INTERNAL MEDICINE

## 2022-04-08 ENCOUNTER — HOSPITAL ENCOUNTER (EMERGENCY)
Age: 87
Discharge: HOME | End: 2022-04-09
Attending: EMERGENCY MEDICINE
Payer: MEDICARE

## 2022-04-08 DIAGNOSIS — I50.20 SYSTOLIC CONGESTIVE HEART FAILURE, UNSPECIFIED HF CHRONICITY (HCC): Primary | ICD-10-CM

## 2022-04-08 DIAGNOSIS — E87.6 HYPOKALEMIA: ICD-10-CM

## 2022-04-08 PROCEDURE — 99284 EMERGENCY DEPT VISIT MOD MDM: CPT

## 2022-04-09 ENCOUNTER — APPOINTMENT (OUTPATIENT)
Dept: GENERAL RADIOLOGY | Age: 87
End: 2022-04-09
Payer: MEDICARE

## 2022-04-09 ENCOUNTER — APPOINTMENT (OUTPATIENT)
Dept: CT IMAGING | Age: 87
End: 2022-04-09
Payer: MEDICARE

## 2022-04-09 VITALS
TEMPERATURE: 98.2 F | BODY MASS INDEX: 28.99 KG/M2 | SYSTOLIC BLOOD PRESSURE: 146 MMHG | WEIGHT: 174 LBS | OXYGEN SATURATION: 92 % | HEIGHT: 65 IN | HEART RATE: 69 BPM | RESPIRATION RATE: 23 BRPM | DIASTOLIC BLOOD PRESSURE: 67 MMHG

## 2022-04-09 LAB
A/G RATIO: 1.3 (ref 1.1–2.2)
ALBUMIN SERPL-MCNC: 3.1 G/DL (ref 3.4–5)
ALP BLD-CCNC: 70 U/L (ref 40–129)
ALT SERPL-CCNC: 13 U/L (ref 10–40)
ANION GAP SERPL CALCULATED.3IONS-SCNC: 10 MMOL/L (ref 3–16)
AST SERPL-CCNC: 20 U/L (ref 15–37)
BASOPHILS ABSOLUTE: 0.1 K/UL (ref 0–0.2)
BASOPHILS RELATIVE PERCENT: 0.7 %
BILIRUB SERPL-MCNC: 0.3 MG/DL (ref 0–1)
BUN BLDV-MCNC: 27 MG/DL (ref 7–20)
CALCIUM SERPL-MCNC: 7.7 MG/DL (ref 8.3–10.6)
CHLORIDE BLD-SCNC: 116 MMOL/L (ref 99–110)
CO2: 18 MMOL/L (ref 21–32)
CREAT SERPL-MCNC: <0.5 MG/DL (ref 0.6–1.2)
EKG ATRIAL RATE: 37 BPM
EKG DIAGNOSIS: NORMAL
EKG Q-T INTERVAL: 448 MS
EKG QRS DURATION: 78 MS
EKG QTC CALCULATION (BAZETT): 469 MS
EKG R AXIS: -4 DEGREES
EKG T AXIS: 25 DEGREES
EKG VENTRICULAR RATE: 66 BPM
EOSINOPHILS ABSOLUTE: 0.3 K/UL (ref 0–0.6)
EOSINOPHILS RELATIVE PERCENT: 2.8 %
GFR AFRICAN AMERICAN: >60
GFR NON-AFRICAN AMERICAN: >60
GLUCOSE BLD-MCNC: 107 MG/DL (ref 70–99)
HCT VFR BLD CALC: 37 % (ref 36–48)
HEMOGLOBIN: 12.3 G/DL (ref 12–16)
LYMPHOCYTES ABSOLUTE: 1.6 K/UL (ref 1–5.1)
LYMPHOCYTES RELATIVE PERCENT: 17 %
MAGNESIUM: 1.8 MG/DL (ref 1.8–2.4)
MCH RBC QN AUTO: 31.2 PG (ref 26–34)
MCHC RBC AUTO-ENTMCNC: 33.3 G/DL (ref 31–36)
MCV RBC AUTO: 93.5 FL (ref 80–100)
MONOCYTES ABSOLUTE: 0.9 K/UL (ref 0–1.3)
MONOCYTES RELATIVE PERCENT: 9.3 %
NEUTROPHILS ABSOLUTE: 6.7 K/UL (ref 1.7–7.7)
NEUTROPHILS RELATIVE PERCENT: 70.2 %
PDW BLD-RTO: 13.7 % (ref 12.4–15.4)
PLATELET # BLD: 229 K/UL (ref 135–450)
PMV BLD AUTO: 9.2 FL (ref 5–10.5)
POTASSIUM REFLEX MAGNESIUM: 2.7 MMOL/L (ref 3.5–5.1)
PRO-BNP: 1296 PG/ML (ref 0–449)
RBC # BLD: 3.96 M/UL (ref 4–5.2)
SODIUM BLD-SCNC: 144 MMOL/L (ref 136–145)
TOTAL PROTEIN: 5.5 G/DL (ref 6.4–8.2)
TROPONIN: <0.01 NG/ML
WBC # BLD: 9.5 K/UL (ref 4–11)

## 2022-04-09 PROCEDURE — 36415 COLL VENOUS BLD VENIPUNCTURE: CPT

## 2022-04-09 PROCEDURE — 83735 ASSAY OF MAGNESIUM: CPT

## 2022-04-09 PROCEDURE — 71045 X-RAY EXAM CHEST 1 VIEW: CPT

## 2022-04-09 PROCEDURE — 83880 ASSAY OF NATRIURETIC PEPTIDE: CPT

## 2022-04-09 PROCEDURE — 74176 CT ABD & PELVIS W/O CONTRAST: CPT

## 2022-04-09 PROCEDURE — 85025 COMPLETE CBC W/AUTO DIFF WBC: CPT

## 2022-04-09 PROCEDURE — 93010 ELECTROCARDIOGRAM REPORT: CPT | Performed by: INTERNAL MEDICINE

## 2022-04-09 PROCEDURE — 80053 COMPREHEN METABOLIC PANEL: CPT

## 2022-04-09 PROCEDURE — 93005 ELECTROCARDIOGRAM TRACING: CPT | Performed by: EMERGENCY MEDICINE

## 2022-04-09 PROCEDURE — 84484 ASSAY OF TROPONIN QUANT: CPT

## 2022-04-09 PROCEDURE — 6370000000 HC RX 637 (ALT 250 FOR IP): Performed by: EMERGENCY MEDICINE

## 2022-04-09 RX ORDER — FUROSEMIDE 40 MG/1
20 TABLET ORAL ONCE
Status: COMPLETED | OUTPATIENT
Start: 2022-04-09 | End: 2022-04-09

## 2022-04-09 RX ORDER — FUROSEMIDE 40 MG/1
20 TABLET ORAL DAILY
Status: DISCONTINUED | OUTPATIENT
Start: 2022-04-09 | End: 2022-04-09

## 2022-04-09 RX ADMIN — FUROSEMIDE 20 MG: 40 TABLET ORAL at 03:11

## 2022-04-09 RX ADMIN — POTASSIUM BICARBONATE 40 MEQ: 782 TABLET, EFFERVESCENT ORAL at 03:11

## 2022-04-09 NOTE — ED NOTES
Discharge and education instructions reviewed. Patient verbalized understanding, teach-back successful. Patient denied questions at this time. No acute distress noted. Patient instructed to follow-up as noted - return to emergency department if symptoms worsen. Patient verbalized understanding. Discharged per EDMD with discharged instructions.      [facility]  04/09/22 9641

## 2022-04-09 NOTE — ED PROVIDER NOTES
[address] [facility]  eMERGENCY dEPARTMENT eNCOUnter        Pt Name: Geovanna Malave  MRN: [id number]  Anuradhatrongfemmanuel 7/26/1927  Date of evaluation: 4/8/2022  Provider: Dina Camacho MD  PCP: Josef Ballesteros DO      CHIEF COMPLAINT       Chief Complaint   Patient presents with    Urinary Retention     Patient states that she has been unable to fully empty her bladder. Patient also complains of abdominal distention. Patient denies cp. HISTORY OFPRESENT ILLNESS   (Location/Symptom, Timing/Onset, Context/Setting, Quality, Duration, Modifying Factors,Severity)  Note limiting factors. Geovanna Malave is a 80 y.o. female complains of urinary retention for several days only voiding small amounts she stopped her Lasix on Monday at her doctor's request patient states she feels like her bladder is full and she is only able to void a small amount she also states she has had some mild dyspnea on exertion but no PND orthopnea    Nursing Notes were all reviewed and agreed with or any disagreements were addressed  in the HPI. REVIEW OF SYSTEMS    (2-9 systems for level 4, 10 or more for level 5)       REVIEW OF SYSTEMS    Constitutional:  Denies fever, chills, or weakness   Eyes:  Denies vision changes  HENT:  Denies sore throat or neck pain   Respiratory:  Denies cough some  shortness of breath   Cardiovascular:  Denies chest pain  GI:  Denies abdominal pain, nausea, vomiting, or diarrhea   Musculoskeletal:  Denies back pain   Skin: no rash or vesicles   Neurologic:  no headache weakness focal    Lymphatic:  no swollen  nodes   Psychiatric: no si or hs thoughts     All systems negative except as marked. Positives and Pertinent negatives as per HPI. Except as noted above in the ROS, all other systems were reviewed andnegative.        PASTMEDICAL HISTORY     Past Medical History:   Diagnosis Date    Arthritis     OA    Atrial fibrillation ([facility] [address]     Blood transfusion 1975    Carpal tunnel syndrome     mild    Dizziness     Edema     Frequent falls     Glaucoma     [place] (hard of hearing)     bilateral hearing aids    Hx of cholelithiasis     Hyperlipidemia     Hypertension     Osteoarthrosis, unspecified whether generalized or localized, unspecified site     Postmenopausal atrophic vaginitis     Unspecified disorder of autonomic nervous system     Unspecified essential hypertension     Unspecified eustachian tube disorder     Unspecified glaucoma(365.9)     Unspecified hemorrhoids without mention of complication     Urethral stricture unspecified          SURGICAL HISTORY       Past Surgical History:   Procedure Laterality Date    COLONOSCOPY      ELBOW SURGERY      left I&D    EYE SURGERY      cataracts    HEMORRHOID SURGERY      HYSTERECTOMY      1 ovary left ;later oophorectomy     KNEE CARTILAGE SURGERY      LAMINECTOMY  1/19/12    L  3-4 bilateral laminectomy discectomy         CURRENT MEDICATIONS       Previous Medications    ACETAMINOPHEN (TYLENOL) 500 MG TABLET    Take 1,000 mg by mouth three times daily    AMLODIPINE (NORVASC) 5 MG TABLET    TAKE ONE TABLET BY MOUTH DAILY    BRIMONIDINE (ALPHAGAN) 0.2 % OPHTHALMIC SOLUTION    Place 1 drop into both eyes 3 times daily     DORZOLAMIDE (TRUSOPT) 2 % OPHTHALMIC SOLUTION    Place 1 drop into both eyes 3 times daily     DULOXETINE (CYMBALTA) 30 MG EXTENDED RELEASE CAPSULE    Take 1 capsule by mouth daily Total dose 90 mg qd. DULOXETINE (CYMBALTA) 60 MG EXTENDED RELEASE CAPSULE    Take 1 capsule by mouth daily Total dose 90 mg qd    GLUCOSAMINE-CHONDROIT-VIT C-MN (GLUCOSAMINE CHONDR 500 COMPLEX PO)    Take  by mouth. LUTEIN PO    Take by mouth    MULTIPLE VITAMIN PO    Take  by mouth.       OLMESARTAN (BENICAR) 20 MG TABLET    TAKE 1 TABLET EVERY DAY    POTASSIUM CHLORIDE (KLOR-CON SPRINKLE) 10 MEQ EXTENDED RELEASE CAPSULE    Take 1 capsule by mouth 2 times daily    PRAVASTATIN (PRAVACHOL) 10 MG TABLET    TAKE 1 TABLET EVERY DAY    TRIAMTERENE-HYDROCHLOROTHIAZIDE (MAXZIDE) 75-50 MG PER TABLET    1 po qd prn edema    WARFARIN (COUMADIN) 5 MG TABLET    TAKE 1 TABLET EVERY DAY       ALLERGIES     Ace inhibitors, Catapres [clonidine hcl], Diovan [valsartan], Eszopiclone, Morphine, and Tramadol hcl    FAMILY HISTORY       Family History   Problem Relation Age of Onset    Diabetes Mother     High Blood Pressure Mother     Heart Disease Mother     Diabetes Brother     High Blood Pressure Brother     Heart Disease Brother     Cancer Brother     Rheum Arthritis Daughter         fibromayalgia    Cancer Brother         prostate cancer     Heart Attack Brother           SOCIAL HISTORY       Social History     Socioeconomic History    Marital status:      Spouse name: None    Number of children: None    Years of education: None    Highest education level: None   Occupational History    None   Tobacco Use    Smoking status: Never Smoker    Smokeless tobacco: Never Used   Vaping Use    Vaping Use: Never used   Substance and Sexual Activity    Alcohol use: Yes     Alcohol/week: 0.0 standard drinks     Comment: wine with meals occ    Drug use: No    Sexual activity: None   Other Topics Concern    None   Social History Narrative    3  siblings    7 children    3 sons live close by     Social Determinants of Health     Financial Resource Strain: Low Risk     Difficulty of Paying Living Expenses: Not hard at all   Food Insecurity: No Food Insecurity    Worried About Running Out of Food in the Last Year: Never true    Liz of Food in the Last Year: Never true   Transportation Needs:     Lack of Transportation (Medical): Not on file    Lack of Transportation (Non-Medical):  Not on file   Physical Activity:     Days of Exercise per Week: Not on file    Minutes of Exercise per Session: Not on file   Stress:     Feeling of Stress : Not on file   Social Connections:     Frequency of Communication with Friends and Family: Not on file    Frequency of Social Gatherings with Friends and Family: Not on file    Attends Zoroastrian Services: Not on file    Active Member of Clubs or Organizations: Not on file    Attends Club or Organization Meetings: Not on file    Marital Status: Not on file   Intimate Partner Violence:     Fear of Current or Ex-Partner: Not on file    Emotionally Abused: Not on file    Physically Abused: Not on file    Sexually Abused: Not on file   Housing Stability:     Unable to Pay for Housing in the Last Year: Not on file    Number of [place] in the Last Year: Not on file    Unstable Housing in the Last Year: Not on file       SCREENINGS    [place] Coma Scale  Eye Opening: Spontaneous  Best Verbal Response: Oriented  Best Motor Response: Obeys commands  [place] Coma Scale Score: 15        PHYSICAL EXAM    (up to 7 for level 4, 8 or more for level 5)     ED Triage Vitals   BP Temp Temp Source Pulse Resp SpO2 Height Weight   04/09/22 0009 04/09/22 0007 04/09/22 0007 04/09/22 0007 04/09/22 0007 04/09/22 0007 04/09/22 0007 04/09/22 0007   (!) 146/67 98.2 °F (36.8 °C) Oral 71 20 93 % 5' 5\" (1.651 m) 174 lb (78.9 kg)           General Appearance:  Alert, cooperative, no distress, appears stated age. Head:  Normocephalic, without obvious abnormality, atraumatic. Eyes:  conjunctiva/corneas clear, EOM's intact. Sclera anicteric. ENT: Mucous membranes moist.   Neck: Supple, symmetrical, trachea midline, no adenopathy. No jugular venous distention. Lungs:   No Respiratory Distress. no rales  rhonchi rub   Chest Wall:  Nontender  no deformity   Heart:  Rsr no murmer gallop    Abdomen:   Soft nontender no organomegally some suprapubic fullness   Extremities:  Full range of motion. no deformity   Pulses: Equal  upper and lower    Skin:  No rashes or lesions to exposed skin. Neurologic: Alert and oriented X 3. Motor grossly normal.  Speech clear.        DIAGNOSTIC RESULTS   LABS:    Labs Reviewed   CBC WITH AUTO DIFFERENTIAL - Abnormal; Notable for the following components:       Result Value    RBC 3.96 (*)     All other components within normal limits   COMPREHENSIVE METABOLIC PANEL W/ REFLEX TO MG FOR LOW K - Abnormal; Notable for the following components:    Potassium reflex Magnesium 2.7 (*)     Chloride 116 (*)     CO2 18 (*)     Glucose 107 (*)     BUN 27 (*)     CREATININE <0.5 (*)     Calcium 7.7 (*)     Total Protein 5.5 (*)     Albumin 3.1 (*)     All other components within normal limits    Narrative:     CALL  [facility] tel. [phone],  Chemistry results called to and read back by Jennie Sexton, 04/09/2022 01:47,  by [address] [place] [address] - Abnormal; Notable for the following components:    Pro-BNP 1,296 (*)     All other components within normal limits    Narrative:     Yajaira Betancourt  [facility] tel. [phone],  Chemistry results called to and read back by Jennie Sexton, 04/09/2022 01:47,  by Antonio Sanchez   TROPONIN    Narrative:     Yajaira Betancourt  [facility] tel. [phone],  Chemistry results called to and read back by Jennie Sexton, 04/09/2022 01:47,  by [address]   MAGNESIUM    Narrative:     Yajaira Betancourt  [facility] tel. [phone],  Chemistry results called to and read back by Jennie Sexton, 04/09/2022 01:47,  by [address]       All other labs were within normal range or not returned as of thisdictation. EKG:  All EKG's are interpreted by the Emergency Department Physician who either signs or Co-signs this chart in the absence of a cardiologist.    EKG Interpretation    Interpreted by emergency department physician    Rhythm: atrial fibrillation - controlled  Rate: normal  Axis: normal  Ectopy: none  Conduction: normal  ST Segments: no acute change  T Waves: no acute change  Q Waves: 111    Clinical Impression: Atrial fibrillation    Eleuterio Haas MD      RADIOLOGY:   Non-plain film images such as CT, Ultrasound and MRI are read by the radiologist. Plainradiographic images are visualized and preliminarily interpreted by the  ED Provider with the belowfindings:        Interpretation per the Radiologist below, if available at the time of this note:    XR CHEST PORTABLE   Final Result   Central vascular congestion with bibasilar infiltrates and pleural effusions. This may represent atelectasis or pneumonia. CT ABDOMEN PELVIS WO CONTRAST Additional Contrast? None   Final Result   1. Cardiomegaly, with moderate bilateral pleural effusions, patchy perihilar   and lower lobe ground-glass infiltrates. Findings suggestive of cardiogenic   pulmonary edema. Pneumonia is in the differential however. 2. Mild subcapsular nodular change of the liver parenchyma, which could be   related to early cirrhosis. No portal hypertension or splenomegaly is seen. 3. Small nonobstructive calculus in the right kidney. No obstructive urinary   tract calculi. 4. Small hiatal hernia. Diverticulosis without acute diverticulitis. 5. Mild bladder distention, though without wall thickening or findings to   suggest at let obstruction.                PROCEDURES   Unless otherwise noted below, none     Procedures    CRITICAL CARE TIME   N/A      CONSULTS:  None    EMERGENCY DEPARTMENT COURSE and DIFFERENTIAL DIAGNOSIS/MDM:   Vitals:    Vitals:    04/09/22 0007 04/09/22 0009   BP:  (!) 146/67   Pulse: 71    Resp: 20    Temp: 98.2 °F (36.8 °C)    TempSrc: Oral    SpO2: 93%    Weight: 174 lb (78.9 kg)    Height: 5' 5\" (1.651 m)        Patient was given the following medications:  Medications   potassium bicarb-citric acid (EFFER-K) effervescent tablet 40 mEq (has no administration in time range)   furosemide (LASIX) tablet 20 mg (has no administration in time range)       Bladder scan shows no retention of abdomen shows CHF patient has a potassium of 2.7 and she will increase her potassium supplement and restart her Maxide she was given a dose of Lasix p.o. here in the department her sats are 93% she does not want to be admitted to the hospital she will have potassium replacement and restart her diuretics I do not hear any rales in the chest no overt heart failure was advised to return if worse    The patient tolerated their visit well. Thepatient and / or the family were informed of the results of any tests, a time was given to answer questions. FINAL IMPRESSION      1. Systolic congestive heart failure, unspecified HF chronicity ([address]    2.  Hypokalemia        DISPOSITION/PLAN   DISPOSITION Decision To Discharge 04/09/2022 02:29:56 AM      PATIENT REFERRED TO:  Saeed Valdes DO  [address] [place] [address]  [phone]    Schedule an appointment as soon as possible for a visit in 3 days        DISCHARGE MEDICATIONS:  New Prescriptions    No medications on file       DISCONTINUED MEDICATIONS:  Discontinued Medications    No medications on file              (Please note that portions of this note were completed with a voice recognition program.  Efforts were made to edit the dictations but occasionally words aremis-transcribed.)    Anh Hackett MD (electronically signed)         Anh Hackett MD  04/09/22 9315

## 2022-04-12 ENCOUNTER — CARE COORDINATION (OUTPATIENT)
Dept: CARE COORDINATION | Age: 87
End: 2022-04-12

## 2022-04-12 SDOH — HEALTH STABILITY: PHYSICAL HEALTH: ON AVERAGE, HOW MANY MINUTES DO YOU ENGAGE IN EXERCISE AT THIS LEVEL?: 0 MIN

## 2022-04-12 SDOH — HEALTH STABILITY: PHYSICAL HEALTH: ON AVERAGE, HOW MANY DAYS PER WEEK DO YOU ENGAGE IN MODERATE TO STRENUOUS EXERCISE (LIKE A BRISK WALK)?: 0 DAYS

## 2022-04-12 SDOH — ECONOMIC STABILITY: HOUSING INSECURITY: IN THE LAST 12 MONTHS, HOW MANY PLACES HAVE YOU LIVED?: 1

## 2022-04-12 SDOH — ECONOMIC STABILITY: HOUSING INSECURITY
IN THE LAST 12 MONTHS, WAS THERE A TIME WHEN YOU DID NOT HAVE A STEADY PLACE TO SLEEP OR SLEPT IN A SHELTER (INCLUDING NOW)?: NO

## 2022-04-12 SDOH — ECONOMIC STABILITY: INCOME INSECURITY: IN THE LAST 12 MONTHS, WAS THERE A TIME WHEN YOU WERE NOT ABLE TO PAY THE MORTGAGE OR RENT ON TIME?: NO

## 2022-04-12 NOTE — CARE COORDINATION
Ambulatory Care Coordination  ED Follow up Call    Reason for ED visit:  CHF and urinary retention  Status:     improved    Did you call your PCP prior to going to the ED? No      Did you receive a discharge instructions from the Emergency Room? Yes  Review of Instructions:     Understands what to report/when to return?:  Yes   Understands discharge instructions?:  Yes   Following discharge instructions?:  Yes   If not why? Are there any new complaints of pain? No  New Pain Meds? No    Constipation prophylaxis needed? N/A    If you have a wound is the dressing clean, dry, and intact? N/A  Understands wound care regimen? N/A    Are there any other complaints/concerns that you wish to tell your provider? No. ED f/u scheduled 4/15/22    FU appts/Provider:    Future Appointments   Date Time Provider Jesus Shepherd   4/15/2022  3:00 PM AVA Mary - CNP [facility] [place] [facility]   6/27/2022 10:40 AM Cande Singh, DO [facility] [place] [facility]           New Medications?:   No      Medication Reconciliation by phone - Yes  Understands Medications? Yes  Taking Medications? Yes  Can you swallow your pills? Yes    Any further needs in the home i.e. Equipment? No    Link to services in community?:  No   Which services:      AC outreached patient for ED f/u. She is agreeable to enrollment into care coordination. Patient was evaluated at [facility] on 4/8 for complaints of urinary retention for several days, felt like bladder was full and only able to void small amounts. Mild perez but PND orthopnea. Lasix was stopped. Bladder scan showed no retention of urine. +CHF, hypokalemia K+ 2.7 - instructed to increase potassium supplement to 3xs daily and consume bananas and orange juice. Maxide resumed. ACCOREY reviewed medications with patient. She manages her medications independently and fills a weekly mediset. She confirmed she has increased potassium to 3 times daily and resumed maxide.  ALICIA educated patient on sx of hypokalemia. She is eating potassium rich foods and drinking orange juice. Her supportive family assists with transportation to medical appointments. Our conversation was brief. Patient is agreeable to follow up from [facility] after appointment on 4/15/22. PLAN:    Complete enrollment  Labs?   SDOH/PCAM  Goals

## 2022-04-15 ENCOUNTER — OFFICE VISIT (OUTPATIENT)
Dept: INTERNAL MEDICINE CLINIC | Age: 87
End: 2022-04-15
Payer: MEDICARE

## 2022-04-15 VITALS
OXYGEN SATURATION: 96 % | SYSTOLIC BLOOD PRESSURE: 136 MMHG | DIASTOLIC BLOOD PRESSURE: 58 MMHG | BODY MASS INDEX: 28.96 KG/M2 | HEART RATE: 73 BPM | TEMPERATURE: 98.2 F | HEIGHT: 65 IN

## 2022-04-15 DIAGNOSIS — E87.5 HYPERKALEMIA: ICD-10-CM

## 2022-04-15 DIAGNOSIS — Z87.898 H/O URINARY RETENTION: Primary | ICD-10-CM

## 2022-04-15 DIAGNOSIS — R79.89 ELEVATED BRAIN NATRIURETIC PEPTIDE (BNP) LEVEL: ICD-10-CM

## 2022-04-15 LAB
ANION GAP SERPL CALCULATED.3IONS-SCNC: 13 MMOL/L (ref 3–16)
BUN BLDV-MCNC: 21 MG/DL (ref 7–20)
CALCIUM SERPL-MCNC: 9.4 MG/DL (ref 8.3–10.6)
CHLORIDE BLD-SCNC: 99 MMOL/L (ref 99–110)
CO2: 22 MMOL/L (ref 21–32)
CREAT SERPL-MCNC: 0.8 MG/DL (ref 0.6–1.2)
GFR AFRICAN AMERICAN: >60
GFR NON-AFRICAN AMERICAN: >60
GLUCOSE BLD-MCNC: 123 MG/DL (ref 70–99)
POTASSIUM SERPL-SCNC: 4 MMOL/L (ref 3.5–5.1)
PRO-BNP: 963 PG/ML (ref 0–449)
SODIUM BLD-SCNC: 134 MMOL/L (ref 136–145)

## 2022-04-15 PROCEDURE — 1036F TOBACCO NON-USER: CPT | Performed by: NURSE PRACTITIONER

## 2022-04-15 PROCEDURE — 99214 OFFICE O/P EST MOD 30 MIN: CPT | Performed by: NURSE PRACTITIONER

## 2022-04-15 PROCEDURE — 1090F PRES/ABSN URINE INCON ASSESS: CPT | Performed by: NURSE PRACTITIONER

## 2022-04-15 PROCEDURE — 1123F ACP DISCUSS/DSCN MKR DOCD: CPT | Performed by: NURSE PRACTITIONER

## 2022-04-15 PROCEDURE — G8427 DOCREV CUR MEDS BY ELIG CLIN: HCPCS | Performed by: NURSE PRACTITIONER

## 2022-04-15 PROCEDURE — 4040F PNEUMOC VAC/ADMIN/RCVD: CPT | Performed by: NURSE PRACTITIONER

## 2022-04-15 PROCEDURE — G8417 CALC BMI ABV UP PARAM F/U: HCPCS | Performed by: NURSE PRACTITIONER

## 2022-04-15 ASSESSMENT — ENCOUNTER SYMPTOMS
NAUSEA: 0
SHORTNESS OF BREATH: 0
VOMITING: 0
COUGH: 0
CONSTIPATION: 0
DIARRHEA: 0
WHEEZING: 0
ABDOMINAL PAIN: 0

## 2022-04-15 NOTE — PROGRESS NOTES
Office Visit  4/15/2022    Subjective:  Chief Complaint   Patient presents with    ED Follow-up     Feeling much better     HPI:  Allen Amador is a 80 y.o. female who presents to the clinic today for follow up. Pt was seen in the ER on 4/9/22 for urinary retention after stopping maxzide and potassium. Bladder scan showed no retention. CHF was noted with hypokalemia. K supplement was increased and recommended to restart her Maxzide. Today, patient reports she is feeling much better. She restarted her Maxzide and urinary retention resolved. Taking K supplements- restarted this since ER visit- increased from 2 to 3 tablets daily (10 mg tablets). Pt reports she is urinating more -back to baseline- since restarting diuretic. Denies s/s of infection. Denies dysuria/hematuria. Denies fevers/chills. Denies chest pain, palpitations, shortness of breath, trouble breathing, lightheadedness, dizziness or blurred vision. Denies abdominal pains. Son present in the room helping provide history. Review of Systems   Constitutional: Negative for chills, fatigue, fever and unexpected weight change. Eyes: Negative for visual disturbance. Respiratory: Negative for cough, shortness of breath and wheezing. Cardiovascular: Negative for chest pain, palpitations and leg swelling. Gastrointestinal: Negative for abdominal pain, constipation, diarrhea, nausea and vomiting. Genitourinary: Positive for frequency. Negative for dysuria, hematuria, urgency, vaginal bleeding, vaginal discharge and vaginal pain. Skin: Negative for pallor and rash. Neurological: Negative for dizziness, weakness, light-headedness, numbness and headaches.      Allergies   Allergen Reactions    Ace Inhibitors      cough    Catapres [Clonidine Hcl]      patch    Diovan [Valsartan]      ineffective    Eszopiclone      ineffective    Morphine      All derivatives- GI upset    Tramadol Hcl      dizziness     Current Outpatient Rx Medication Sig Dispense Refill    triamterene-hydroCHLOROthiazide (MAXZIDE) 75-50 MG per tablet 1 po qd prn edema 90 tablet 3    olmesartan (BENICAR) 20 MG tablet TAKE 1 TABLET EVERY DAY 90 tablet 3    pravastatin (PRAVACHOL) 10 MG tablet TAKE 1 TABLET EVERY DAY 90 tablet 3    warfarin (COUMADIN) 5 MG tablet TAKE 1 TABLET EVERY DAY 90 tablet 3    DULoxetine (CYMBALTA) 30 MG extended release capsule Take 1 capsule by mouth daily Total dose 90 mg qd.  90 capsule 3    DULoxetine (CYMBALTA) 60 MG extended release capsule Take 1 capsule by mouth daily Total dose 90 mg qd 90 capsule 3    amLODIPine (NORVASC) 5 MG tablet TAKE ONE TABLET BY MOUTH DAILY 90 tablet 3    potassium chloride (KLOR-CON SPRINKLE) 10 MEQ extended release capsule Take 1 capsule by mouth 2 times daily 180 capsule 3    brimonidine (ALPHAGAN) 0.2 % ophthalmic solution Place 1 drop into both eyes 3 times daily       LUTEIN PO Take by mouth       dorzolamide (TRUSOPT) 2 % ophthalmic solution Place 1 drop into both eyes 3 times daily       acetaminophen (TYLENOL) 500 MG tablet Take 1,000 mg by mouth three times daily       Glucosamine-Chondroit-Vit C-Mn (GLUCOSAMINE CHONDR 500 COMPLEX PO) Take by mouth       MULTIPLE VITAMIN PO Take by mouth         Patient Active Problem List   Diagnosis    Herniated nucleus pulposus with myelopathy, cervical    Spinal stenosis of lumbar region    Essential hypertension    Osteoarthritis of both knees    Glaucoma    Hyperlipidemia LDL goal <100    Idiopathic peripheral neuropathy    Atrial fibrillation (HCC)    [place]    History of traumatic brain injury    Blurred vision    Sebaceous cyst    At high risk for falls    Hematoma    Urinary hesitancy    Bradycardia    Acute respiratory failure with hypoxia (HCC)    Open wound of right elbow    Venous insufficiency    Lower extremity edema     Wt Readings from Last 3 Encounters:   04/09/22 174 lb (78.9 kg)   03/28/22 181 lb (82.1 kg)   01/14/22 178 lb (80.7 kg)     BP Readings from Last 3 Encounters:   04/15/22 (!) 136/58   04/09/22 (!) 146/67   03/28/22 (!) 118/58     Objective/Physical Exam:  BP (!) 136/58   Pulse 73   Temp 98.2 °F (36.8 °C) (Temporal)   Ht 5' 5\" (1.651 m)   SpO2 96%   BMI 28.96 kg/m²   Body mass index is 28.96 kg/m². Physical Exam  Vitals reviewed. Constitutional:       General: She is not in acute distress. Appearance: She is well-developed. She is not diaphoretic. HENT:      Head: Normocephalic and atraumatic. Eyes:      Pupils: Pupils are equal, round, and reactive to light. Cardiovascular:      Rate and Rhythm: Normal rate and regular rhythm. Pulmonary:      Effort: Pulmonary effort is normal. No respiratory distress. Breath sounds: Normal breath sounds. No wheezing or rales. Chest:      Chest wall: No tenderness. Abdominal:      General: Bowel sounds are normal. There is no distension. Palpations: Abdomen is soft. There is no mass. Tenderness: There is no abdominal tenderness. There is no guarding or rebound. Skin:     General: Skin is warm and dry. Neurological:      Mental Status: She is alert and oriented to person, place, and time. Coordination: Coordination normal.   Psychiatric:         Mood and Affect: Mood normal.       Assessment and Plan:  Anjana Torres was seen today for ed follow-up. Diagnoses and all orders for this visit:    H/O urinary retention/Hyperkalemia/Elevated brain natriuretic peptide (BNP) level  -    ER note reviewed. Labs reviewed. CT A/P and CXR reviewed. - Lungs clear to auscultation. Oxygen saturation 96% on room air. Afebrile. BP stable. - Symptoms resolved. - Recommend pt continue current medication regimen and will reevaluate labs today. - Basic Metabolic Panel; Future  -     Brain Natriuretic Peptide; Future  - Pt will call if symptoms worsen or fail to improve.  Pt will call with s/s of infection.  - Red flag warning signs reviewed with the pt and she will go to the ER if these occur.  - Has f/u with PCP per pt. Return for as previously scheduled or sooner if needed. Pt will call if symptoms worsen or fail to improve. All questions answered. Pt states no further questions or concerns at this time.    Electronically signed by: AVA Chen CNP 04/15/22

## 2022-05-03 ENCOUNTER — CARE COORDINATION (OUTPATIENT)
Dept: CARE COORDINATION | Age: 87
End: 2022-05-03

## 2022-05-03 ENCOUNTER — NURSE ONLY (OUTPATIENT)
Dept: INTERNAL MEDICINE CLINIC | Age: 87
End: 2022-05-03
Payer: MEDICARE

## 2022-05-03 DIAGNOSIS — Z79.01 ANTICOAGULATED ON COUMADIN: Primary | ICD-10-CM

## 2022-05-03 LAB
INTERNATIONAL NORMALIZATION RATIO, POC: 2.1
PROTHROMBIN TIME, POC: NORMAL

## 2022-05-03 PROCEDURE — 85610 PROTHROMBIN TIME: CPT | Performed by: INTERNAL MEDICINE

## 2022-05-09 ENCOUNTER — CARE COORDINATION (OUTPATIENT)
Dept: CARE COORDINATION | Age: 87
End: 2022-05-09

## 2022-05-09 NOTE — CARE COORDINATION
Ambulatory Care Coordination Note  5/9/2022  CM Risk Score: 0  [place] 10 Year Mortality Risk Score: 79%     ACC: Nina Dykes, RN    Summary Note:     ACM outreached patient for cc f/u    Patient reports she is doing well. Her potassium has returned to normal. 4/15 - K+ 4.0. She continues to take maxzide and potassium 10 MEQ 3 times daily. She denies chest pain, abdominal pain, sob breath or difficulty emptying her bladder. She states she receives excellent support from her family and all of her needs are currently met. Our conversation was short because patient does not feel she needs care coordination at this time. AC will discharge per patient request.         Care Coordination Interventions    Program Enrollment: Complex Care  Referral from Primary Care Provider: No  Suggested Interventions and Community Resources         Goals Addressed    None         Prior to Admission medications    Medication Sig Start Date End Date Taking?  Authorizing Provider   triamterene-hydroCHLOROthiazide (MAXZIDE) 75-50 MG per tablet 1 po qd prn edema 3/28/22   Damion Laureano, DO   olmesartan (BENICAR) 20 MG tablet TAKE 1 TABLET EVERY DAY 3/4/22   Damion Laureano, DO   pravastatin (PRAVACHOL) 10 MG tablet TAKE 1 TABLET EVERY DAY 2/21/22   Damion Laureano, DO   warfarin (COUMADIN) 5 MG tablet TAKE 1 TABLET EVERY DAY 2/21/22   Damion Laureano, DO   DULoxetine (CYMBALTA) 30 MG extended release capsule Take 1 capsule by mouth daily Total dose 90 mg qd. 1/14/22   Damion Laureano, DO   DULoxetine (CYMBALTA) 60 MG extended release capsule Take 1 capsule by mouth daily Total dose 90 mg qd 1/14/22   Damion Laureano, DO   amLODIPine (NORVASC) 5 MG tablet TAKE ONE TABLET BY MOUTH DAILY 10/11/21   Damion Laureano, DO   potassium chloride (KLOR-CON SPRINKLE) 10 MEQ extended release capsule Take 1 capsule by mouth 2 times daily 8/26/21   Damion Laureano, DO   brimonidine (ALPHAGAN) 0.2 % ophthalmic solution Place 1 drop into both eyes 3 times daily  1/29/20   Historical Provider, MD   LUTEIN PO Take by mouth     Historical Provider, MD   dorzolamide (TRUSOPT) 2 % ophthalmic solution Place 1 drop into both eyes 3 times daily     Historical Provider, MD   acetaminophen (TYLENOL) 500 MG tablet Take 1,000 mg by mouth three times daily     Historical Provider, MD   Glucosamine-Chondroit-Vit C-Mn (GLUCOSAMINE CHONDR 500 COMPLEX PO) Take by mouth     Historical Provider, MD   MULTIPLE VITAMIN PO Take by mouth      Historical Provider, MD       Future Appointments   Date Time Provider Jesus Cora   6/27/2022 10:40 AM DO RAVIN [facility] [place] - [facility]     ,   Congestive Heart Failure Assessment           Symptoms:         and   General Assessment

## 2022-05-24 ENCOUNTER — NURSE ONLY (OUTPATIENT)
Dept: INTERNAL MEDICINE CLINIC | Age: 87
End: 2022-05-24
Payer: MEDICARE

## 2022-05-24 DIAGNOSIS — Z79.01 ANTICOAGULATED ON COUMADIN: Primary | ICD-10-CM

## 2022-05-24 LAB
INTERNATIONAL NORMALIZATION RATIO, POC: 2.4
PROTHROMBIN TIME, POC: NORMAL

## 2022-05-24 PROCEDURE — 85610 PROTHROMBIN TIME: CPT | Performed by: INTERNAL MEDICINE

## 2022-05-26 ENCOUNTER — PATIENT MESSAGE (OUTPATIENT)
Dept: INTERNAL MEDICINE CLINIC | Age: 87
End: 2022-05-26

## 2022-05-26 DIAGNOSIS — E78.5 HYPERLIPIDEMIA, UNSPECIFIED HYPERLIPIDEMIA TYPE: ICD-10-CM

## 2022-05-26 DIAGNOSIS — I10 ESSENTIAL HYPERTENSION: ICD-10-CM

## 2022-05-26 RX ORDER — POTASSIUM CHLORIDE 750 MG/1
10 CAPSULE, EXTENDED RELEASE ORAL 3 TIMES DAILY
Qty: 270 CAPSULE | Refills: 0 | Status: SHIPPED | OUTPATIENT
Start: 2022-05-26 | End: 2022-05-29 | Stop reason: SDUPTHER

## 2022-05-27 NOTE — TELEPHONE ENCOUNTER
From: Allen Amador  To: Dr. Oquendo Dys: 5/26/2022 5:22 PM EDT  Subject: change prescription dose    I need a new prescription for Potassium since when I went to ER they upped it to 3 Pills a day instead of 2   Thank you, Deanna

## 2022-05-29 DIAGNOSIS — I10 ESSENTIAL HYPERTENSION: ICD-10-CM

## 2022-05-29 DIAGNOSIS — E78.5 HYPERLIPIDEMIA, UNSPECIFIED HYPERLIPIDEMIA TYPE: ICD-10-CM

## 2022-05-29 RX ORDER — POTASSIUM CHLORIDE 750 MG/1
10 CAPSULE, EXTENDED RELEASE ORAL 3 TIMES DAILY
Qty: 270 CAPSULE | Refills: 3 | Status: SHIPPED | OUTPATIENT
Start: 2022-05-29 | End: 2022-05-31 | Stop reason: SDUPTHER

## 2022-05-31 DIAGNOSIS — E78.5 HYPERLIPIDEMIA, UNSPECIFIED HYPERLIPIDEMIA TYPE: ICD-10-CM

## 2022-05-31 DIAGNOSIS — I10 ESSENTIAL HYPERTENSION: ICD-10-CM

## 2022-05-31 RX ORDER — POTASSIUM CHLORIDE 750 MG/1
10 CAPSULE, EXTENDED RELEASE ORAL 3 TIMES DAILY
Qty: 270 CAPSULE | Refills: 3 | Status: SHIPPED | OUTPATIENT
Start: 2022-05-31

## 2022-06-21 RX ORDER — DULOXETIN HYDROCHLORIDE 60 MG/1
CAPSULE, DELAYED RELEASE ORAL
Qty: 90 CAPSULE | Refills: 3 | Status: SHIPPED | OUTPATIENT
Start: 2022-06-21

## 2022-06-27 ENCOUNTER — OFFICE VISIT (OUTPATIENT)
Dept: INTERNAL MEDICINE CLINIC | Age: 87
End: 2022-06-27
Payer: MEDICARE

## 2022-06-27 VITALS
WEIGHT: 172 LBS | BODY MASS INDEX: 28.62 KG/M2 | SYSTOLIC BLOOD PRESSURE: 122 MMHG | HEART RATE: 72 BPM | DIASTOLIC BLOOD PRESSURE: 68 MMHG

## 2022-06-27 DIAGNOSIS — Z71.89 ADVANCED DIRECTIVES, COUNSELING/DISCUSSION: ICD-10-CM

## 2022-06-27 DIAGNOSIS — I10 ESSENTIAL HYPERTENSION: ICD-10-CM

## 2022-06-27 DIAGNOSIS — I48.11 LONGSTANDING PERSISTENT ATRIAL FIBRILLATION (HCC): ICD-10-CM

## 2022-06-27 DIAGNOSIS — M48.062 SPINAL STENOSIS OF LUMBAR REGION WITH NEUROGENIC CLAUDICATION: ICD-10-CM

## 2022-06-27 DIAGNOSIS — E78.5 HYPERLIPIDEMIA LDL GOAL <100: ICD-10-CM

## 2022-06-27 DIAGNOSIS — M17.0 OSTEOARTHRITIS OF BOTH KNEES, UNSPECIFIED OSTEOARTHRITIS TYPE: ICD-10-CM

## 2022-06-27 DIAGNOSIS — I50.20 SYSTOLIC CONGESTIVE HEART FAILURE, UNSPECIFIED HF CHRONICITY (HCC): ICD-10-CM

## 2022-06-27 DIAGNOSIS — G60.9 IDIOPATHIC PERIPHERAL NEUROPATHY: ICD-10-CM

## 2022-06-27 DIAGNOSIS — Z79.01 ANTICOAGULATED ON COUMADIN: Primary | ICD-10-CM

## 2022-06-27 PROBLEM — R39.11 URINARY HESITANCY: Status: RESOLVED | Noted: 2021-06-15 | Resolved: 2022-06-27

## 2022-06-27 PROBLEM — J96.01 ACUTE RESPIRATORY FAILURE WITH HYPOXIA (HCC): Status: RESOLVED | Noted: 2021-09-26 | Resolved: 2022-06-27

## 2022-06-27 PROBLEM — H53.8 BLURRED VISION: Status: RESOLVED | Noted: 2019-08-29 | Resolved: 2022-06-27

## 2022-06-27 LAB
ANION GAP SERPL CALCULATED.3IONS-SCNC: 12 MMOL/L (ref 3–16)
BUN BLDV-MCNC: 30 MG/DL (ref 7–20)
CALCIUM SERPL-MCNC: 9.8 MG/DL (ref 8.3–10.6)
CHLORIDE BLD-SCNC: 103 MMOL/L (ref 99–110)
CO2: 24 MMOL/L (ref 21–32)
CREAT SERPL-MCNC: 0.9 MG/DL (ref 0.6–1.2)
GFR AFRICAN AMERICAN: >60
GFR NON-AFRICAN AMERICAN: 58
GLUCOSE BLD-MCNC: 102 MG/DL (ref 70–99)
INTERNATIONAL NORMALIZATION RATIO, POC: 1.8
POTASSIUM SERPL-SCNC: 3.7 MMOL/L (ref 3.5–5.1)
PROTHROMBIN TIME, POC: 0
SODIUM BLD-SCNC: 139 MMOL/L (ref 136–145)

## 2022-06-27 PROCEDURE — 85610 PROTHROMBIN TIME: CPT | Performed by: INTERNAL MEDICINE

## 2022-06-27 PROCEDURE — 1090F PRES/ABSN URINE INCON ASSESS: CPT | Performed by: INTERNAL MEDICINE

## 2022-06-27 PROCEDURE — G8417 CALC BMI ABV UP PARAM F/U: HCPCS | Performed by: INTERNAL MEDICINE

## 2022-06-27 PROCEDURE — 1036F TOBACCO NON-USER: CPT | Performed by: INTERNAL MEDICINE

## 2022-06-27 PROCEDURE — G8427 DOCREV CUR MEDS BY ELIG CLIN: HCPCS | Performed by: INTERNAL MEDICINE

## 2022-06-27 PROCEDURE — 99214 OFFICE O/P EST MOD 30 MIN: CPT | Performed by: INTERNAL MEDICINE

## 2022-06-27 PROCEDURE — 1123F ACP DISCUSS/DSCN MKR DOCD: CPT | Performed by: INTERNAL MEDICINE

## 2022-06-27 RX ORDER — CYCLOSPORINE 0.5 MG/ML
EMULSION OPHTHALMIC
COMMUNITY
Start: 2022-05-26 | End: 2022-06-27 | Stop reason: SINTOL

## 2022-06-27 ASSESSMENT — PATIENT HEALTH QUESTIONNAIRE - PHQ9
SUM OF ALL RESPONSES TO PHQ QUESTIONS 1-9: 0
SUM OF ALL RESPONSES TO PHQ QUESTIONS 1-9: 0
2. FEELING DOWN, DEPRESSED OR HOPELESS: 0
1. LITTLE INTEREST OR PLEASURE IN DOING THINGS: 0
SUM OF ALL RESPONSES TO PHQ9 QUESTIONS 1 & 2: 0
SUM OF ALL RESPONSES TO PHQ QUESTIONS 1-9: 0
SUM OF ALL RESPONSES TO PHQ QUESTIONS 1-9: 0

## 2022-06-27 NOTE — PATIENT INSTRUCTIONS
Please increase Coumadin to 5 mg 6 days per week and 2.5 mg 1 day per week. (1 whole pill 6 days per week and 1/2 pill 1 day per week). You will need an INR again in 1 week.

## 2022-06-27 NOTE — PROGRESS NOTES
Patient: Lauren Ibarra is a 80 y.o. female who presents today with the following Chief Complaint(s):  Chief Complaint   Patient presents with    3 Month Follow-Up       HPI     Here today for follow up. Has changed her diet recently, not sure how. Wants to be a DNR-CC. Does not want aggressive care at all. Is ready to go when it is her time. Neuropathy is doing better. Pain is better. Remains  on Cymbalta 90 g qd. Vision is getting worse. Still having visual hallucinations- seeing snow on the ground and on the railing of her neighbor's. Did see a person once. Does see shadows that go off and on. Saw her son the other night. Hallucinations are not bothersome to her. CHF- no issues. HTN- doing well on current medications.      Lab Results   Component Value Date     06/27/2022    K 3.7 06/27/2022     06/27/2022    CO2 24 06/27/2022    BUN 30 (H) 06/27/2022    CREATININE 0.9 06/27/2022    GLUCOSE 102 (H) 06/27/2022    CALCIUM 9.8 06/27/2022    PROT 5.5 (L) 04/09/2022    LABALBU 3.1 (L) 04/09/2022    BILITOT 0.3 04/09/2022    ALKPHOS 70 04/09/2022    AST 20 04/09/2022    ALT 13 04/09/2022    LABGLOM 58 (A) 06/27/2022    GFRAA >60 06/27/2022    AGRATIO 1.3 04/09/2022    GLOB 3.4 07/01/2021       Lab Results   Component Value Date    WBC 9.5 04/09/2022    HGB 12.3 04/09/2022    HCT 37.0 04/09/2022    MCV 93.5 04/09/2022     04/09/2022     Labs Renal Latest Ref Rng & Units 6/27/2022 4/15/2022 4/9/2022 3/28/2022 9/14/2021   BUN 7 - 20 mg/dL 30(H) 21(H) 27(H) 29(H) 30(H)   Cr 0.6 - 1.2 mg/dL 0.9 0.8 <0.5(L) 1.0 1.0   K 3.5 - 5.1 mmol/L 3.7 4.0 2.7(LL) 4.1 4.3   Na 136 - 145 mmol/L 139 134(L) 144 147(H) 141     Lab Results   Component Value Date    CHOL 160 03/28/2022    CHOL 185 04/05/2021    CHOL 182 07/29/2020     Lab Results   Component Value Date    TRIG 190 (H) 03/28/2022    TRIG 245 (H) 04/05/2021    TRIG 244 (H) 07/29/2020     Lab Results   Component Value Date    HDL 46 03/28/2022    HDL 41 04/05/2021    HDL 43 07/29/2020     Lab Results   Component Value Date    LDLCALC 76 03/28/2022    [address] 04/05/2021    LDLCALC 90 07/29/2020     Lab Results   Component Value Date    LABVLDL 38 03/28/2022    LABVLDL 49 04/05/2021    LABVLDL 49 07/29/2020     No results found for: CHOLHDLRATIO      Allergies   Allergen Reactions    Ace Inhibitors      cough    Catapres [Clonidine Hcl]      patch    Diovan [Valsartan]      ineffective    Eszopiclone      ineffective    Morphine      All derivatives- GI upset    Tramadol Hcl      dizziness      Past Medical History:   Diagnosis Date    Arthritis     OA    Atrial fibrillation (HCC)     Blood transfusion     1975    Carpal tunnel syndrome     mild    Dizziness     Edema     Frequent falls     Glaucoma     [place] (hard of hearing)     bilateral hearing aids    Hx of cholelithiasis     Hyperlipidemia     Hypertension     Osteoarthrosis, unspecified whether generalized or localized, unspecified site     Postmenopausal atrophic vaginitis     Unspecified disorder of autonomic nervous system     Unspecified essential hypertension     Unspecified eustachian tube disorder     Unspecified glaucoma(365.9)     Unspecified hemorrhoids without mention of complication     Urethral stricture unspecified       Past Surgical History:   Procedure Laterality Date    COLONOSCOPY      ELBOW SURGERY      left I&D    EYE SURGERY      cataracts    HEMORRHOID SURGERY      HYSTERECTOMY (CERVIX STATUS UNKNOWN)      1 ovary left ;later oophorectomy     KNEE CARTILAGE SURGERY      LAMINECTOMY  1/19/12    L  3-4 bilateral laminectomy discectomy      Social History     Socioeconomic History    Marital status:       Spouse name: Not on file    Number of children: Not on file    Years of education: Not on file    Highest education level: Not on file   Occupational History    Not on file   Tobacco Use    Smoking status: Never Smoker    Smokeless tobacco: Never Used   Vaping Use    Vaping Use: Never used   Substance and Sexual Activity    Alcohol use: Yes     Alcohol/week: 0.0 standard drinks     Comment: wine with meals occ    Drug use: No    Sexual activity: Not on file   Other Topics Concern    Not on file   Social History Narrative    3  siblings    7 children    3 sons live close by     Social Determinants of Health     Financial Resource Strain:     Difficulty of Paying Living Expenses: Not on file   Food Insecurity:     Worried About Running Out of Food in the Last Year: Not on file    Liz of Food in the Last Year: Not on file   Transportation Needs: No Transportation Needs    Lack of Transportation (Medical): No    Lack of Transportation (Non-Medical):  No   Physical Activity: Inactive    Days of Exercise per Week: 0 days    Minutes of Exercise per Session: 0 min   Stress:     Feeling of Stress : Not on file   Social Connections:     Frequency of Communication with Friends and Family: Not on file    Frequency of Social Gatherings with Friends and Family: Not on file    Attends Confucianism Services: Not on file    Active Member of Clubs or Organizations: Not on file    Attends Club or Organization Meetings: Not on file    Marital Status: Not on file   Intimate Partner Violence:     Fear of Current or Ex-Partner: Not on file    Emotionally Abused: Not on file    Physically Abused: Not on file    Sexually Abused: Not on file   Housing Stability: [address] Unable to Pay for Housing in the Last Year: No    Number of [place] in the Last Year: 1    Unstable Housing in the Last Year: No     Family History   Problem Relation Age of Onset    Diabetes Mother     High Blood Pressure Mother     Heart Disease Mother     Diabetes Brother     High Blood Pressure Brother     Heart Disease Brother     Cancer Brother     Rheum Arthritis Daughter         fibromayalgia    Cancer Brother         prostate cancer  Heart Attack Brother         Outpatient Medications Prior to Visit   Medication Sig Dispense Refill    DULoxetine (CYMBALTA) 60 MG extended release capsule TAKE 1 CAPSULE EVERY DAY 90 capsule 3    potassium chloride (MICRO-K) 10 MEQ extended release capsule Take 1 capsule by mouth 3 times daily Taking 3 times daily 270 capsule 3    triamterene-hydroCHLOROthiazide (MAXZIDE) 75-50 MG per tablet 1 po qd prn edema 90 tablet 3    olmesartan (BENICAR) 20 MG tablet TAKE 1 TABLET EVERY DAY 90 tablet 3    pravastatin (PRAVACHOL) 10 MG tablet TAKE 1 TABLET EVERY DAY 90 tablet 3    warfarin (COUMADIN) 5 MG tablet TAKE 1 TABLET EVERY DAY 90 tablet 3    DULoxetine (CYMBALTA) 30 MG extended release capsule Take 1 capsule by mouth daily Total dose 90 mg qd. 90 capsule 3    amLODIPine (NORVASC) 5 MG tablet TAKE ONE TABLET BY MOUTH DAILY 90 tablet 3    brimonidine (ALPHAGAN) 0.2 % ophthalmic solution Place 1 drop into both eyes 3 times daily       LUTEIN PO Take by mouth       dorzolamide (TRUSOPT) 2 % ophthalmic solution Place 1 drop into both eyes 3 times daily       acetaminophen (TYLENOL) 500 MG tablet Take 1,000 mg by mouth three times daily       Glucosamine-Chondroit-Vit C-Mn (GLUCOSAMINE CHONDR 500 COMPLEX PO) Take by mouth       MULTIPLE VITAMIN PO Take by mouth        RESTASIS 0.05 % ophthalmic emulsion  (Patient not taking: Reported on 6/27/2022)       No facility-administered medications prior to visit. Patient'spast medical history, surgical history, family history, medications,  and allergies  were all reviewed and updated as appropriate today. Review of Systems   Constitutional: Negative for appetite change, fatigue and fever. Respiratory: Negative for chest tightness and shortness of breath. Cardiovascular: Negative for chest pain. Gastrointestinal: Negative for constipation and diarrhea. Skin: Negative for rash.        /68   Pulse 72   Wt 172 lb (78 kg)   BMI 28.62 kg/m² Metabolic Panel (Completed)    Hyperlipidemia LDL goal <100     Continue pravastatin 10 mg daily. Idiopathic peripheral neuropathy     Secondary to lumbar spinal stenosis. Continue Cymbalta 90 mg daily which is working well for her. Does not tolerate gabapentin as it impairs her thinking. Osteoarthritis of both knees     Continue with Tylenol as needed for pain control. Has seen Dr. Floydene Lennox who has injected her knees with cortisone and Synvisc. Does use Voltaren gel as needed. Spinal stenosis of lumbar region      Remains on Tylenol for pain control. Has noticed improvement in radicular pain with Cymbalta 90 mg daily. Systolic congestive heart failure, unspecified HF chronicity (HCC)     Continue Maxide 75/50 mg daily, Benicar 20 mg daily, and amlodipine 5 mg daily. Was worse when Maxide was stopped. Relevant Orders    Basic Metabolic Panel (Completed)      Other Visit Diagnoses     Anticoagulated on Coumadin    -  Primary    Relevant Orders    POCT INR (Completed)          Current Outpatient Medications   Medication Sig Dispense Refill    DULoxetine (CYMBALTA) 60 MG extended release capsule TAKE 1 CAPSULE EVERY DAY 90 capsule 3    potassium chloride (MICRO-K) 10 MEQ extended release capsule Take 1 capsule by mouth 3 times daily Taking 3 times daily 270 capsule 3    triamterene-hydroCHLOROthiazide (MAXZIDE) 75-50 MG per tablet 1 po qd prn edema 90 tablet 3    olmesartan (BENICAR) 20 MG tablet TAKE 1 TABLET EVERY DAY 90 tablet 3    pravastatin (PRAVACHOL) 10 MG tablet TAKE 1 TABLET EVERY DAY 90 tablet 3    warfarin (COUMADIN) 5 MG tablet TAKE 1 TABLET EVERY DAY 90 tablet 3    DULoxetine (CYMBALTA) 30 MG extended release capsule Take 1 capsule by mouth daily Total dose 90 mg qd.  90 capsule 3    amLODIPine (NORVASC) 5 MG tablet TAKE ONE TABLET BY MOUTH DAILY 90 tablet 3    brimonidine (ALPHAGAN) 0.2 % ophthalmic solution Place 1 drop into both eyes 3 times daily  LUTEIN PO Take by mouth       dorzolamide (TRUSOPT) 2 % ophthalmic solution Place 1 drop into both eyes 3 times daily       acetaminophen (TYLENOL) 500 MG tablet Take 1,000 mg by mouth three times daily       Glucosamine-Chondroit-Vit C-Mn (GLUCOSAMINE CHONDR 500 COMPLEX PO) Take by mouth       MULTIPLE VITAMIN PO Take by mouth         No current facility-administered medications for this visit. Return in about 3 months (around 9/27/2022).

## 2022-07-03 PROBLEM — N18.30 CHRONIC RENAL DISEASE, STAGE III (HCC): Status: RESOLVED | Noted: 2022-07-03 | Resolved: 2022-07-03

## 2022-07-03 PROBLEM — T14.8XXA HEMATOMA: Status: RESOLVED | Noted: 2021-05-04 | Resolved: 2022-07-03

## 2022-07-03 PROBLEM — S51.001A OPEN WOUND OF RIGHT ELBOW: Status: RESOLVED | Noted: 2022-01-16 | Resolved: 2022-07-03

## 2022-07-03 PROBLEM — R00.1 BRADYCARDIA: Status: RESOLVED | Noted: 2021-07-08 | Resolved: 2022-07-03

## 2022-07-03 PROBLEM — L72.3 SEBACEOUS CYST: Status: RESOLVED | Noted: 2020-12-05 | Resolved: 2022-07-03

## 2022-07-03 PROBLEM — Z71.89 ADVANCED DIRECTIVES, COUNSELING/DISCUSSION: Status: ACTIVE | Noted: 2022-07-03

## 2022-07-03 PROBLEM — N18.30 CHRONIC RENAL DISEASE, STAGE III (HCC): Status: ACTIVE | Noted: 2022-07-03

## 2022-07-03 ASSESSMENT — ENCOUNTER SYMPTOMS
CHEST TIGHTNESS: 0
DIARRHEA: 0
SHORTNESS OF BREATH: 0
CONSTIPATION: 0

## 2022-07-03 NOTE — ASSESSMENT & PLAN NOTE
Patient wants to be DNR comfort care. Does not want heroic measures. DNR comfort care CODE STATUS placed on chart.

## 2022-07-03 NOTE — ASSESSMENT & PLAN NOTE
Remains on Tylenol for pain control. Has noticed improvement in radicular pain with Cymbalta 90 mg daily.

## 2022-07-03 NOTE — ASSESSMENT & PLAN NOTE
Continue with Tylenol as needed for pain control. Has seen Dr. Malinda Mix who has injected her knees with cortisone and Synvisc. Does use Voltaren gel as needed.

## 2022-07-03 NOTE — ASSESSMENT & PLAN NOTE
Asymptomatic. Remains on Coumadin for anticoagulation. INR subtherapeutic-increase Coumadin to 5 mg 6 days/week and 2.5 mg 1 day/week. Repeat INR in 1 week.

## 2022-07-03 NOTE — ASSESSMENT & PLAN NOTE
Secondary to lumbar spinal stenosis. Continue Cymbalta 90 mg daily which is working well for her. Does not tolerate gabapentin as it impairs her thinking.

## 2022-07-08 DIAGNOSIS — I10 ESSENTIAL HYPERTENSION: ICD-10-CM

## 2022-07-08 RX ORDER — AMLODIPINE BESYLATE 5 MG/1
TABLET ORAL
Qty: 90 TABLET | Refills: 3 | Status: SHIPPED | OUTPATIENT
Start: 2022-07-08

## 2022-07-26 ENCOUNTER — NURSE ONLY (OUTPATIENT)
Dept: INTERNAL MEDICINE CLINIC | Age: 87
End: 2022-07-26
Payer: MEDICARE

## 2022-07-26 DIAGNOSIS — Z79.01 ANTICOAGULATED ON COUMADIN: Primary | ICD-10-CM

## 2022-07-26 LAB
INTERNATIONAL NORMALIZATION RATIO, POC: 1.5
PROTHROMBIN TIME, POC: NORMAL

## 2022-07-26 PROCEDURE — 85610 PROTHROMBIN TIME: CPT | Performed by: INTERNAL MEDICINE

## 2022-08-29 ENCOUNTER — NURSE ONLY (OUTPATIENT)
Dept: INTERNAL MEDICINE CLINIC | Age: 87
End: 2022-08-29
Payer: MEDICARE

## 2022-08-29 DIAGNOSIS — Z79.01 ANTICOAGULATED ON COUMADIN: Primary | ICD-10-CM

## 2022-08-29 LAB
INTERNATIONAL NORMALIZATION RATIO, POC: 1.8
PROTHROMBIN TIME, POC: NORMAL

## 2022-08-29 PROCEDURE — 85610 PROTHROMBIN TIME: CPT | Performed by: INTERNAL MEDICINE

## 2022-09-12 ENCOUNTER — NURSE ONLY (OUTPATIENT)
Dept: INTERNAL MEDICINE CLINIC | Age: 87
End: 2022-09-12
Payer: MEDICARE

## 2022-09-12 DIAGNOSIS — Z79.01 ANTICOAGULATED ON COUMADIN: Primary | ICD-10-CM

## 2022-09-12 LAB
INTERNATIONAL NORMALIZATION RATIO, POC: 1.8
PROTHROMBIN TIME, POC: NORMAL

## 2022-09-12 PROCEDURE — 85610 PROTHROMBIN TIME: CPT | Performed by: INTERNAL MEDICINE

## 2022-10-03 ENCOUNTER — OFFICE VISIT (OUTPATIENT)
Dept: INTERNAL MEDICINE CLINIC | Age: 87
End: 2022-10-03
Payer: MEDICARE

## 2022-10-03 VITALS
SYSTOLIC BLOOD PRESSURE: 132 MMHG | HEART RATE: 76 BPM | OXYGEN SATURATION: 98 % | WEIGHT: 170 LBS | DIASTOLIC BLOOD PRESSURE: 72 MMHG | BODY MASS INDEX: 28.29 KG/M2

## 2022-10-03 DIAGNOSIS — G60.9 IDIOPATHIC PERIPHERAL NEUROPATHY: Primary | ICD-10-CM

## 2022-10-03 DIAGNOSIS — Z79.01 ANTICOAGULATED ON COUMADIN: ICD-10-CM

## 2022-10-03 DIAGNOSIS — E78.5 HYPERLIPIDEMIA LDL GOAL <100: ICD-10-CM

## 2022-10-03 DIAGNOSIS — N18.31 STAGE 3A CHRONIC KIDNEY DISEASE (HCC): ICD-10-CM

## 2022-10-03 DIAGNOSIS — E55.9 VITAMIN D INSUFFICIENCY: ICD-10-CM

## 2022-10-03 DIAGNOSIS — I50.20 SYSTOLIC CONGESTIVE HEART FAILURE, UNSPECIFIED HF CHRONICITY (HCC): ICD-10-CM

## 2022-10-03 DIAGNOSIS — R44.1 HALLUCINATIONS, VISUAL: ICD-10-CM

## 2022-10-03 DIAGNOSIS — I10 ESSENTIAL HYPERTENSION: ICD-10-CM

## 2022-10-03 DIAGNOSIS — I48.11 LONGSTANDING PERSISTENT ATRIAL FIBRILLATION (HCC): ICD-10-CM

## 2022-10-03 DIAGNOSIS — R53.83 FATIGUE, UNSPECIFIED TYPE: ICD-10-CM

## 2022-10-03 PROBLEM — N18.30 CHRONIC RENAL DISEASE, STAGE III (HCC): Status: ACTIVE | Noted: 2022-10-03

## 2022-10-03 LAB
INTERNATIONAL NORMALIZATION RATIO, POC: 2.4
PROTHROMBIN TIME, POC: NORMAL

## 2022-10-03 PROCEDURE — 90694 VACC AIIV4 NO PRSRV 0.5ML IM: CPT | Performed by: INTERNAL MEDICINE

## 2022-10-03 PROCEDURE — 85610 PROTHROMBIN TIME: CPT | Performed by: INTERNAL MEDICINE

## 2022-10-03 PROCEDURE — 1123F ACP DISCUSS/DSCN MKR DOCD: CPT | Performed by: INTERNAL MEDICINE

## 2022-10-03 PROCEDURE — G0008 ADMIN INFLUENZA VIRUS VAC: HCPCS | Performed by: INTERNAL MEDICINE

## 2022-10-03 PROCEDURE — G8484 FLU IMMUNIZE NO ADMIN: HCPCS | Performed by: INTERNAL MEDICINE

## 2022-10-03 PROCEDURE — 1036F TOBACCO NON-USER: CPT | Performed by: INTERNAL MEDICINE

## 2022-10-03 PROCEDURE — 99214 OFFICE O/P EST MOD 30 MIN: CPT | Performed by: INTERNAL MEDICINE

## 2022-10-03 PROCEDURE — 1090F PRES/ABSN URINE INCON ASSESS: CPT | Performed by: INTERNAL MEDICINE

## 2022-10-03 PROCEDURE — G8417 CALC BMI ABV UP PARAM F/U: HCPCS | Performed by: INTERNAL MEDICINE

## 2022-10-03 PROCEDURE — G8427 DOCREV CUR MEDS BY ELIG CLIN: HCPCS | Performed by: INTERNAL MEDICINE

## 2022-10-03 RX ORDER — DULOXETIN HYDROCHLORIDE 30 MG/1
CAPSULE, DELAYED RELEASE ORAL
Qty: 90 CAPSULE | Refills: 3 | Status: SHIPPED | OUTPATIENT
Start: 2022-10-03

## 2022-10-03 ASSESSMENT — ENCOUNTER SYMPTOMS
DIARRHEA: 0
CONSTIPATION: 0
SHORTNESS OF BREATH: 0
CHEST TIGHTNESS: 0

## 2022-10-03 NOTE — PROGRESS NOTES
Patient: Agueda Lorenzana is a 80 y.o. female who presents today with the following Chief Complaint(s):  Chief Complaint   Patient presents with    3 Month Follow-Up       HPI    Here today for follow up. Is worried about her hearing in her left ear. Is wondering if she has something stuck in her ear. Is worried as she is always tired, states that she can fall asleep at the drop of a hat. Is still seeing snow and is also seeing fog now. Maybe had a dream last night of her sister bringing her flowers vs hallucinating. Hallucinations are not bothersome to her. Pain is much better since increasing Cymbalta to 90 mg- taking 30 mg am and 60 mg pm. Will try to change to taking the 90 mg total at night time. HTN- no issues with blood pressure. HLD- doing well on pravastatin. INR 2.4 today. Does eat a lot of spinach. Coumadin dose recently increased to 5 mg x 5 days and 2.5 mg x 2 days (Monday and Thursday), up from 5 mg x 4 days and 2.5 mg x 3 days. Her day is governed by her eye health. When eye is more painful, sits in chair holding eye closed. When eye is feeling ok, will play computer games. Does eat breakfast  in her chair watching mass, lunch at her dining room table.      Allergies   Allergen Reactions    Ace Inhibitors      cough    Catapres [Clonidine Hcl]      patch    Diovan [Valsartan]      ineffective    Eszopiclone      ineffective    Morphine      All derivatives- GI upset    Tramadol Hcl      dizziness      Past Medical History:   Diagnosis Date    Arthritis     OA    Atrial fibrillation (HCC)     Blood transfusion     1975    Carpal tunnel syndrome     mild    Dizziness     Edema     Frequent falls     Glaucoma     [place] (hard of hearing)     bilateral hearing aids    Hx of cholelithiasis     Hyperlipidemia     Hypertension     Osteoarthrosis, unspecified whether generalized or localized, unspecified site     Postmenopausal atrophic vaginitis     Unspecified disorder of autonomic nervous system     Unspecified essential hypertension     Unspecified eustachian tube disorder     Unspecified glaucoma(365.9)     Unspecified hemorrhoids without mention of complication     Urethral stricture unspecified       Past Surgical History:   Procedure Laterality Date    COLONOSCOPY      ELBOW SURGERY      left I&D    EYE SURGERY      cataracts    HEMORRHOID SURGERY      HYSTERECTOMY (CERVIX STATUS UNKNOWN)      1 ovary left ;later oophorectomy     KNEE CARTILAGE SURGERY      LAMINECTOMY  12    L  3-4 bilateral laminectomy discectomy      Social History     Socioeconomic History    Marital status:      Spouse name: Not on file    Number of children: Not on file    Years of education: Not on file    Highest education level: Not on file   Occupational History    Not on file   Tobacco Use    Smoking status: Never    Smokeless tobacco: Never   Vaping Use    Vaping Use: Never used   Substance and Sexual Activity    Alcohol use: Yes     Alcohol/week: 0.0 standard drinks     Comment: wine with meals occ    Drug use: No    Sexual activity: Not on file   Other Topics Concern    Not on file   Social History Narrative    3  siblings    7 children    3 sons live close by     Social Determinants of Health     Financial Resource Strain: Not on file   Food Insecurity: Not on file   Transportation Needs: No Transportation Needs    Lack of Transportation (Medical): No    Lack of Transportation (Non-Medical):  No   Physical Activity: Inactive    Days of Exercise per Week: 0 days    Minutes of Exercise per Session: 0 min   Stress: Not on file   Social Connections: Not on file   Intimate Partner Violence: Not on file   Housing Stability: Low Risk     Unable to Pay for Housing in the Last Year: No    Number of Places Lived in the Last Year: 1    Unstable Housing in the Last Year: No     Family History   Problem Relation Age of Onset    Diabetes Mother     High Blood Pressure Mother     Heart Disease Mother     Diabetes Brother     High Blood Pressure Brother     Heart Disease Brother     Cancer Brother     Rheum Arthritis Daughter         fibromayalgia    Cancer Brother         prostate cancer     Heart Attack Brother         Outpatient Medications Prior to Visit   Medication Sig Dispense Refill    amLODIPine (NORVASC) 5 MG tablet TAKE 1 TABLET EVERY DAY 90 tablet 3    DULoxetine (CYMBALTA) 60 MG extended release capsule TAKE 1 CAPSULE EVERY DAY 90 capsule 3    potassium chloride (MICRO-K) 10 MEQ extended release capsule Take 1 capsule by mouth 3 times daily Taking 3 times daily 270 capsule 3    triamterene-hydroCHLOROthiazide (MAXZIDE) 75-50 MG per tablet 1 po qd prn edema 90 tablet 3    olmesartan (BENICAR) 20 MG tablet TAKE 1 TABLET EVERY DAY 90 tablet 3    pravastatin (PRAVACHOL) 10 MG tablet TAKE 1 TABLET EVERY DAY 90 tablet 3    warfarin (COUMADIN) 5 MG tablet TAKE 1 TABLET EVERY DAY 90 tablet 3    DULoxetine (CYMBALTA) 30 MG extended release capsule Take 1 capsule by mouth daily Total dose 90 mg qd. 90 capsule 3    brimonidine (ALPHAGAN) 0.2 % ophthalmic solution Place 1 drop into both eyes 3 times daily       LUTEIN PO Take by mouth       dorzolamide (TRUSOPT) 2 % ophthalmic solution Place 1 drop into both eyes 3 times daily       acetaminophen (TYLENOL) 500 MG tablet Take 1,000 mg by mouth three times daily       Glucosamine-Chondroit-Vit C-Mn (GLUCOSAMINE CHONDR 500 COMPLEX PO) Take by mouth       MULTIPLE VITAMIN PO Take by mouth         No facility-administered medications prior to visit. Patient'spast medical history, surgical history, family history, medications,  and allergies  were all reviewed and updated as appropriate today. Review of Systems   Constitutional:  Positive for fatigue. Negative for appetite change and fever. Respiratory:  Negative for chest tightness and shortness of breath. Cardiovascular:  Negative for chest pain.    Gastrointestinal:  Negative for constipation and diarrhea. Skin:  Negative for rash. /72 (Site: Left Upper Arm, Position: Sitting, Cuff Size: Medium Adult)   Pulse 76   Wt 170 lb (77.1 kg)   SpO2 98%   BMI 28.29 kg/m²   Physical Exam  Vitals and nursing note reviewed. Constitutional:       Appearance: She is well-developed. She is not toxic-appearing. HENT:      Head: Normocephalic. Right Ear: Tympanic membrane, ear canal and external ear normal.      Left Ear: Tympanic membrane, ear canal and external ear normal.      Mouth/Throat:      Pharynx: No oropharyngeal exudate or posterior oropharyngeal erythema. Eyes:      General: No scleral icterus. Extraocular Movements: Extraocular movements intact. Conjunctiva/sclera: Conjunctivae normal.      Pupils: Pupils are equal, round, and reactive to light. Neck:      Thyroid: No thyroid mass or thyromegaly. Vascular: No carotid bruit. Cardiovascular:      Rate and Rhythm: Normal rate and regular rhythm. Heart sounds: Normal heart sounds. No murmur heard. Pulmonary:      Effort: Pulmonary effort is normal.      Breath sounds: Normal breath sounds. Musculoskeletal:      Right lower leg: No edema. Left lower leg: No edema. Lymphadenopathy:      Cervical: No cervical adenopathy. Neurological:      General: No focal deficit present. Mental Status: She is alert and oriented to person, place, and time. Psychiatric:         Mood and Affect: Mood normal.         Behavior: Behavior normal. Behavior is cooperative. ASSESSMENT/PLAN:    Problem List Items Addressed This Visit       Atrial fibrillation ([address]     Asymptomatic. Continue Coumadin for anticoagulation. No antiarrhythmics due to bradycardia. Relevant Orders    Lipid Panel    TSH with Reflex    Chronic renal disease, stage III ([address] [295720]     Monitor kidney function. Essential hypertension     Well-controlled.   Continue amlodipine 5 mg daily, Maxide 75/50 mg daily, and Benicar 20 mg daily         Relevant Orders    Comprehensive Metabolic Panel    CBC with Auto Differential    Lipid Panel    Fatigue      Suspect multifactorial: In part related to medications and patient will try changing Cymbalta from 30 a.m./60 p.m. to 90 total in the p.m. Also suspect related to boredom. Relevant Orders    Vitamin B12 & Folate    Hallucinations, visual     Not bothersome to patient. Hyperlipidemia LDL goal <100      Continue pravastatin 10 mg daily. Idiopathic peripheral neuropathy - Primary     Secondary to lumbar spinal stenosis. Continue Cymbalta 90 mg daily-currently taking 30 mg a.m./60 mg p.m. Recommend switching to 90 mg in the p.m. to see if that helps with her fatigue. Systolic congestive heart failure, unspecified HF chronicity (HCC)      Stable. Remains on Maxide 75/50 mg daily, Benicar 20 mg daily, and amlodipine 5 mg daily. Vitamin D insufficiency      Check vitamin D level.          Relevant Orders    Vitamin D 25 Hydroxy     Other Visit Diagnoses       Anticoagulated on Coumadin        Relevant Orders    POCT INR (Completed)            Current Outpatient Medications   Medication Sig Dispense Refill    amLODIPine (NORVASC) 5 MG tablet TAKE 1 TABLET EVERY DAY 90 tablet 3    DULoxetine (CYMBALTA) 60 MG extended release capsule TAKE 1 CAPSULE EVERY DAY 90 capsule 3    potassium chloride (MICRO-K) 10 MEQ extended release capsule Take 1 capsule by mouth 3 times daily Taking 3 times daily 270 capsule 3    triamterene-hydroCHLOROthiazide (MAXZIDE) 75-50 MG per tablet 1 po qd prn edema 90 tablet 3    olmesartan (BENICAR) 20 MG tablet TAKE 1 TABLET EVERY DAY 90 tablet 3    pravastatin (PRAVACHOL) 10 MG tablet TAKE 1 TABLET EVERY DAY 90 tablet 3    warfarin (COUMADIN) 5 MG tablet TAKE 1 TABLET EVERY DAY 90 tablet 3    brimonidine (ALPHAGAN) 0.2 % ophthalmic solution Place 1 drop into both eyes 3 times daily       LUTEIN PO Take by mouth dorzolamide (TRUSOPT) 2 % ophthalmic solution Place 1 drop into both eyes 3 times daily       acetaminophen (TYLENOL) 500 MG tablet Take 1,000 mg by mouth three times daily       Glucosamine-Chondroit-Vit C-Mn (GLUCOSAMINE CHONDR 500 COMPLEX PO) Take by mouth       MULTIPLE VITAMIN PO Take by mouth        DULoxetine (CYMBALTA) 30 MG extended release capsule TAKE 1 CAPSULE DAILY (TOTAL DOSE 90 MG DAILY). 90 capsule 3     No current facility-administered medications for this visit. Return in about 3 months (around 1/3/2023).

## 2022-10-09 PROBLEM — R53.83 FATIGUE: Status: ACTIVE | Noted: 2022-10-09

## 2022-10-09 PROBLEM — R44.1 HALLUCINATIONS, VISUAL: Status: ACTIVE | Noted: 2022-10-09

## 2022-10-09 PROBLEM — E55.9 VITAMIN D INSUFFICIENCY: Status: ACTIVE | Noted: 2022-10-09

## 2022-10-10 NOTE — ASSESSMENT & PLAN NOTE
Suspect multifactorial: In part related to medications and patient will try changing Cymbalta from 30 a.m./60 p.m. to 90 total in the p.m. Also suspect related to boredom.

## 2022-10-10 NOTE — ASSESSMENT & PLAN NOTE
Secondary to lumbar spinal stenosis. Continue Cymbalta 90 mg daily-currently taking 30 mg a.m./60 mg p.m. Recommend switching to 90 mg in the p.m. to see if that helps with her fatigue.

## 2022-10-31 DIAGNOSIS — R53.83 FATIGUE, UNSPECIFIED TYPE: ICD-10-CM

## 2022-10-31 DIAGNOSIS — Z79.01 ANTICOAGULATED ON COUMADIN: ICD-10-CM

## 2022-10-31 DIAGNOSIS — I10 ESSENTIAL HYPERTENSION: ICD-10-CM

## 2022-10-31 DIAGNOSIS — I48.11 LONGSTANDING PERSISTENT ATRIAL FIBRILLATION (HCC): ICD-10-CM

## 2022-10-31 DIAGNOSIS — E55.9 VITAMIN D INSUFFICIENCY: ICD-10-CM

## 2022-10-31 LAB
A/G RATIO: 1.4 (ref 1.1–2.2)
ALBUMIN SERPL-MCNC: 4.1 G/DL (ref 3.4–5)
ALP BLD-CCNC: 72 U/L (ref 40–129)
ALT SERPL-CCNC: 11 U/L (ref 10–40)
ANION GAP SERPL CALCULATED.3IONS-SCNC: 13 MMOL/L (ref 3–16)
AST SERPL-CCNC: 14 U/L (ref 15–37)
BASOPHILS ABSOLUTE: 0.1 K/UL (ref 0–0.2)
BASOPHILS RELATIVE PERCENT: 1 %
BILIRUB SERPL-MCNC: 0.5 MG/DL (ref 0–1)
BUN BLDV-MCNC: 20 MG/DL (ref 7–20)
CALCIUM SERPL-MCNC: 9.5 MG/DL (ref 8.3–10.6)
CHLORIDE BLD-SCNC: 103 MMOL/L (ref 99–110)
CHOLESTEROL, TOTAL: 190 MG/DL (ref 0–199)
CO2: 23 MMOL/L (ref 21–32)
CREAT SERPL-MCNC: 0.9 MG/DL (ref 0.6–1.2)
EOSINOPHILS ABSOLUTE: 0.5 K/UL (ref 0–0.6)
EOSINOPHILS RELATIVE PERCENT: 5.5 %
FOLATE: >20 NG/ML (ref 4.78–24.2)
GFR SERPL CREATININE-BSD FRML MDRD: 59 ML/MIN/{1.73_M2}
GLUCOSE BLD-MCNC: 113 MG/DL (ref 70–99)
HCT VFR BLD CALC: 41.7 % (ref 36–48)
HDLC SERPL-MCNC: 45 MG/DL (ref 40–60)
HEMOGLOBIN: 13.5 G/DL (ref 12–16)
INR BLD: 1.99 (ref 0.87–1.14)
LDL CHOLESTEROL CALCULATED: 109 MG/DL
LYMPHOCYTES ABSOLUTE: 2.3 K/UL (ref 1–5.1)
LYMPHOCYTES RELATIVE PERCENT: 23.1 %
MCH RBC QN AUTO: 31.4 PG (ref 26–34)
MCHC RBC AUTO-ENTMCNC: 32.5 G/DL (ref 31–36)
MCV RBC AUTO: 96.8 FL (ref 80–100)
MONOCYTES ABSOLUTE: 0.7 K/UL (ref 0–1.3)
MONOCYTES RELATIVE PERCENT: 6.8 %
NEUTROPHILS ABSOLUTE: 6.3 K/UL (ref 1.7–7.7)
NEUTROPHILS RELATIVE PERCENT: 63.6 %
PDW BLD-RTO: 14.9 % (ref 12.4–15.4)
PLATELET # BLD: 212 K/UL (ref 135–450)
PMV BLD AUTO: 9.4 FL (ref 5–10.5)
POTASSIUM SERPL-SCNC: 3.9 MMOL/L (ref 3.5–5.1)
PROTHROMBIN TIME: 22.6 SEC (ref 11.7–14.5)
RBC # BLD: 4.31 M/UL (ref 4–5.2)
SODIUM BLD-SCNC: 139 MMOL/L (ref 136–145)
TOTAL PROTEIN: 7.1 G/DL (ref 6.4–8.2)
TRIGL SERPL-MCNC: 179 MG/DL (ref 0–150)
TSH REFLEX: 2.14 UIU/ML (ref 0.27–4.2)
VITAMIN B-12: >2000 PG/ML (ref 211–911)
VITAMIN D 25-HYDROXY: 66.5 NG/ML
VLDLC SERPL CALC-MCNC: 36 MG/DL
WBC # BLD: 9.9 K/UL (ref 4–11)

## 2022-11-22 DIAGNOSIS — Z79.01 ANTICOAGULATED ON COUMADIN: ICD-10-CM

## 2022-11-22 DIAGNOSIS — Z79.01 ANTICOAGULATED ON COUMADIN: Primary | ICD-10-CM

## 2022-11-23 ENCOUNTER — TELEPHONE (OUTPATIENT)
Dept: INTERNAL MEDICINE CLINIC | Age: 87
End: 2022-11-23

## 2022-11-23 NOTE — TELEPHONE ENCOUNTER
Pt will need to come in and have her INR again. They could not test the one the lab did done to the tube it was in. The lab is open until 11:30 on Friday.

## 2022-11-29 DIAGNOSIS — Z79.01 ANTICOAGULATED ON COUMADIN: ICD-10-CM

## 2022-11-29 LAB
INR BLD: 1.84 (ref 0.87–1.14)
PROTHROMBIN TIME: 21.2 SEC (ref 11.7–14.5)

## 2022-12-19 DIAGNOSIS — Z79.01 ANTICOAGULATED ON COUMADIN: ICD-10-CM

## 2022-12-19 LAB
INR BLD: 2.22 (ref 0.87–1.14)
PROTHROMBIN TIME: 24.7 SEC (ref 11.7–14.5)

## 2022-12-30 RX ORDER — WARFARIN SODIUM 5 MG/1
TABLET ORAL
Qty: 90 TABLET | Refills: 3 | Status: SHIPPED | OUTPATIENT
Start: 2022-12-30

## 2022-12-30 NOTE — TELEPHONE ENCOUNTER
Future Appointments    Encounter Information    Provider Department Appt Notes   1/9/2023 Luis Gómez, [id number] [facility] 3 months     Past Visits    Date Provider Specialty Visit Type Primary Dx   10/03/2022 Luis Gómez DO Internal Medicine Office Visit Idiopathic peripheral neuropathy

## 2023-01-09 ENCOUNTER — OFFICE VISIT (OUTPATIENT)
Dept: INTERNAL MEDICINE CLINIC | Age: 88
End: 2023-01-09
Payer: MEDICARE

## 2023-01-09 VITALS
SYSTOLIC BLOOD PRESSURE: 144 MMHG | OXYGEN SATURATION: 95 % | HEART RATE: 77 BPM | BODY MASS INDEX: 29.12 KG/M2 | DIASTOLIC BLOOD PRESSURE: 68 MMHG | WEIGHT: 175 LBS

## 2023-01-09 DIAGNOSIS — E78.5 HYPERLIPIDEMIA LDL GOAL <100: ICD-10-CM

## 2023-01-09 DIAGNOSIS — R44.1 HALLUCINATIONS, VISUAL: ICD-10-CM

## 2023-01-09 DIAGNOSIS — I50.20 SYSTOLIC CONGESTIVE HEART FAILURE, UNSPECIFIED HF CHRONICITY (HCC): ICD-10-CM

## 2023-01-09 DIAGNOSIS — I48.11 LONGSTANDING PERSISTENT ATRIAL FIBRILLATION (HCC): Primary | ICD-10-CM

## 2023-01-09 DIAGNOSIS — I10 ESSENTIAL HYPERTENSION: ICD-10-CM

## 2023-01-09 DIAGNOSIS — R53.83 FATIGUE, UNSPECIFIED TYPE: ICD-10-CM

## 2023-01-09 DIAGNOSIS — R73.9 HYPERGLYCEMIA: ICD-10-CM

## 2023-01-09 DIAGNOSIS — G60.9 IDIOPATHIC PERIPHERAL NEUROPATHY: ICD-10-CM

## 2023-01-09 DIAGNOSIS — N18.31 STAGE 3A CHRONIC KIDNEY DISEASE (HCC): ICD-10-CM

## 2023-01-09 PROCEDURE — 1090F PRES/ABSN URINE INCON ASSESS: CPT | Performed by: INTERNAL MEDICINE

## 2023-01-09 PROCEDURE — 1036F TOBACCO NON-USER: CPT | Performed by: INTERNAL MEDICINE

## 2023-01-09 PROCEDURE — G8417 CALC BMI ABV UP PARAM F/U: HCPCS | Performed by: INTERNAL MEDICINE

## 2023-01-09 PROCEDURE — G8484 FLU IMMUNIZE NO ADMIN: HCPCS | Performed by: INTERNAL MEDICINE

## 2023-01-09 PROCEDURE — 1123F ACP DISCUSS/DSCN MKR DOCD: CPT | Performed by: INTERNAL MEDICINE

## 2023-01-09 PROCEDURE — 99214 OFFICE O/P EST MOD 30 MIN: CPT | Performed by: INTERNAL MEDICINE

## 2023-01-09 PROCEDURE — G8427 DOCREV CUR MEDS BY ELIG CLIN: HCPCS | Performed by: INTERNAL MEDICINE

## 2023-01-09 SDOH — ECONOMIC STABILITY: FOOD INSECURITY: WITHIN THE PAST 12 MONTHS, YOU WORRIED THAT YOUR FOOD WOULD RUN OUT BEFORE YOU GOT MONEY TO BUY MORE.: NEVER TRUE

## 2023-01-09 SDOH — ECONOMIC STABILITY: FOOD INSECURITY: WITHIN THE PAST 12 MONTHS, THE FOOD YOU BOUGHT JUST DIDN'T LAST AND YOU DIDN'T HAVE MONEY TO GET MORE.: NEVER TRUE

## 2023-01-09 ASSESSMENT — SOCIAL DETERMINANTS OF HEALTH (SDOH): HOW HARD IS IT FOR YOU TO PAY FOR THE VERY BASICS LIKE FOOD, HOUSING, MEDICAL CARE, AND HEATING?: NOT HARD AT ALL

## 2023-01-09 NOTE — ASSESSMENT & PLAN NOTE
Symptomatically controlled on Cymbalta 90 mg daily. Has previously been on gabapentin and had side effects of worsening balance and foggy headedness. Recommend portable hand warmers to help with his hand coldness.

## 2023-01-09 NOTE — ASSESSMENT & PLAN NOTE
She continues to have visual hallucinations. She is aware that that she is hallucinating and they are not bothersome to her. Suspect hallucinations may be related to Cymbalta. Patient does not wish to change Cymbalta as it does help with her neuropathy pain.

## 2023-01-09 NOTE — PROGRESS NOTES
Patient: Natalie Byers is a 80 y.o. female who presents today with the following Chief Complaint(s):  Chief Complaint   Patient presents with    3 Month Follow-Up     Neuropathy is getting worse-       HPI    Here today for follow up. Accompanied by her aid. Eyes are worse today. Has ophthalmology tomorrow. Changed Cymbalta to 90 mg in the evening from 30 am/60 pm. Energy level is better. Neuropathy is getting worse. Having sensation of swelling in feet as well intermittently feeling like her feet/legs are \"freezing cold\". Does feel like alpha lipoic acid is helping. Hands are also cold. Does use a heating pad but hands get cold after she stops using them. Balance is terrible. Does use her walker. No falls. Still having hallucinations. Not bothered by them. Seeing snow, little boys, dogs. Knows that she is having hallucinations.      Results for orders placed or performed in visit on 12/19/22   [facility]   Result Value Ref Range    Protime 24.7 (H) 11.7 - 14.5 sec    INR 2.22 (H) 0.87 - 1.14      Lab Results   Component Value Date     10/31/2022    K 3.9 10/31/2022     10/31/2022    CO2 23 10/31/2022    BUN 20 10/31/2022    CREATININE 0.9 10/31/2022    GLUCOSE 113 (H) 10/31/2022    CALCIUM 9.5 10/31/2022    PROT 7.1 10/31/2022    LABALBU 4.1 10/31/2022    BILITOT 0.5 10/31/2022    ALKPHOS 72 10/31/2022    AST 14 (L) 10/31/2022    ALT 11 10/31/2022    LABGLOM 59 (A) 10/31/2022    GFRAA >60 06/27/2022    AGRATIO 1.4 10/31/2022    GLOB 3.4 07/01/2021         Allergies   Allergen Reactions    Ace Inhibitors      cough    Catapres [Clonidine Hcl]      patch    Diovan [Valsartan]      ineffective    Eszopiclone      ineffective    Morphine      All derivatives- GI upset    Tramadol Hcl      dizziness      Past Medical History:   Diagnosis Date    Arthritis     OA    Atrial fibrillation (HCC)     Blood transfusion     1975    Carpal tunnel syndrome     mild    Dizziness     Edema     Frequent falls     Glaucoma     [place] (hard of hearing)     bilateral hearing aids    Hx of cholelithiasis     Hyperlipidemia     Hypertension     Osteoarthrosis, unspecified whether generalized or localized, unspecified site     Postmenopausal atrophic vaginitis     Unspecified disorder of autonomic nervous system     Unspecified essential hypertension     Unspecified eustachian tube disorder     Unspecified glaucoma(365.9)     Unspecified hemorrhoids without mention of complication     Urethral stricture unspecified       Past Surgical History:   Procedure Laterality Date    COLONOSCOPY      ELBOW SURGERY      left I&D    EYE SURGERY      cataracts    HEMORRHOID SURGERY      HYSTERECTOMY (CERVIX STATUS UNKNOWN)      1 ovary left ;later oophorectomy     KNEE CARTILAGE SURGERY      LAMINECTOMY  12    L  3-4 bilateral laminectomy discectomy      Social History     Socioeconomic History    Marital status:      Spouse name: Not on file    Number of children: Not on file    Years of education: Not on file    Highest education level: Not on file   Occupational History    Not on file   Tobacco Use    Smoking status: Never    Smokeless tobacco: Never   Vaping Use    Vaping Use: Never used   Substance and Sexual Activity    Alcohol use: Yes     Alcohol/week: 0.0 standard drinks     Comment: wine with meals occ    Drug use: No    Sexual activity: Not on file   Other Topics Concern    Not on file   Social History Narrative    3  siblings    7 children    3 sons live close by     Social Determinants of Health     Financial Resource Strain: Low Risk     Difficulty of Paying Living Expenses: Not hard at all   Food Insecurity: No Food Insecurity    Worried About Running Out of Food in the Last Year: Never true    [address] in the Last Year: Never true   Transportation Needs: No Transportation Needs    Lack of Transportation (Medical): No    Lack of Transportation (Non-Medical):  No   Physical Activity: Inactive Days of Exercise per Week: 0 days    Minutes of Exercise per Session: 0 min   Stress: Not on file   Social Connections: Not on file   Intimate Partner Violence: Not on file   Housing Stability: Low Risk     Unable to Pay for Housing in the Last Year: No    Number of Places Lived in the Last Year: 1    Unstable Housing in the Last Year: No     Family History   Problem Relation Age of Onset    Diabetes Mother     High Blood Pressure Mother     Heart Disease Mother     Diabetes Brother     High Blood Pressure Brother     Heart Disease Brother     Cancer Brother     Rheum Arthritis Daughter         fibromayalgia    Cancer Brother         prostate cancer     Heart Attack Brother         Outpatient Medications Prior to Visit   Medication Sig Dispense Refill    warfarin (COUMADIN) 5 MG tablet TAKE 1 TABLET EVERY DAY 90 tablet 3    DULoxetine (CYMBALTA) 30 MG extended release capsule TAKE 1 CAPSULE DAILY (TOTAL DOSE 90 MG DAILY).  90 capsule 3    amLODIPine (NORVASC) 5 MG tablet TAKE 1 TABLET EVERY DAY 90 tablet 3    DULoxetine (CYMBALTA) 60 MG extended release capsule TAKE 1 CAPSULE EVERY DAY 90 capsule 3    potassium chloride (MICRO-K) 10 MEQ extended release capsule Take 1 capsule by mouth 3 times daily Taking 3 times daily 270 capsule 3    triamterene-hydroCHLOROthiazide (MAXZIDE) 75-50 MG per tablet 1 po qd prn edema 90 tablet 3    olmesartan (BENICAR) 20 MG tablet TAKE 1 TABLET EVERY DAY 90 tablet 3    pravastatin (PRAVACHOL) 10 MG tablet TAKE 1 TABLET EVERY DAY 90 tablet 3    brimonidine (ALPHAGAN) 0.2 % ophthalmic solution Place 1 drop into both eyes 3 times daily       LUTEIN PO Take by mouth       dorzolamide (TRUSOPT) 2 % ophthalmic solution Place 1 drop into both eyes 3 times daily       Glucosamine-Chondroit-Vit C-Mn (GLUCOSAMINE CHONDR 500 COMPLEX PO) Take by mouth       MULTIPLE VITAMIN PO Take by mouth        acetaminophen (TYLENOL) 500 MG tablet Take 1,000 mg by mouth three times daily        No facility-administered medications prior to visit. Patient'spast medical history, surgical history, family history, medications,  and allergies  were all reviewed and updated as appropriate today. Review of Systems    BP (!) 144/68 (Site: Left Upper Arm, Position: Sitting, Cuff Size: Medium Adult)   Pulse 77   Wt 175 lb (79.4 kg)   SpO2 95%   BMI 29.12 kg/m²   Physical Exam    ASSESSMENT/PLAN:    Problem List Items Addressed This Visit       Atrial fibrillation ([address] [facility] [address] - Primary      Asymptomatic. No antiarrhythmics due to bradycardia. Remains on Coumadin for anticoagulation. Standing order for INR placed in computer as POCT INRs have becoming increasingly more difficult with patient's neuropathy. Currently on Coumadin 5 mg 5 days/week and 2.5 mg 2 days/week. Due for repeat INR next week. Relevant Orders    Protime-INR    Chronic renal disease, stage III ([address] [facility] [address] [770098]      Most recent creatinine was 0.9 in October 2022. Repeat labs in April. Essential hypertension      Acceptable for age. Continue amlodipine 5 mg daily, Maxide 75/50 mg daily, and Benicar 20 mg daily. Relevant Orders    Comprehensive Metabolic Panel    CBC with Auto Differential    Fatigue      Ivan is better since switching Cymbalta to 90 mg in the evening. Hallucinations, visual      She continues to have visual hallucinations. She is aware that that she is hallucinating and they are not bothersome to her. Suspect hallucinations may be related to Cymbalta. Patient does not wish to change Cymbalta as it does help with her neuropathy pain. Hyperglycemia      Check hemoglobin A1c with next set of labs. Relevant Orders    Hemoglobin A1C    Hyperlipidemia LDL goal <100      Continue pravastatin 10 mg daily. Check lipid panel, CMP in April. Relevant Orders    Lipid Panel    Idiopathic peripheral neuropathy     Symptomatically controlled on Cymbalta 90 mg daily.   Has previously been on gabapentin and had side effects of worsening balance and foggy headedness. Recommend portable hand warmers to help with his hand coldness. Relevant Orders    Vitamin B12 & Folate    Systolic congestive heart failure, unspecified HF chronicity (HCC)      Stable on Maxide 75/50 mg daily, Benicar 20 mg daily, and amlodipine 5 mg daily. Current Outpatient Medications   Medication Sig Dispense Refill    warfarin (COUMADIN) 5 MG tablet TAKE 1 TABLET EVERY DAY 90 tablet 3    DULoxetine (CYMBALTA) 30 MG extended release capsule TAKE 1 CAPSULE DAILY (TOTAL DOSE 90 MG DAILY). 90 capsule 3    amLODIPine (NORVASC) 5 MG tablet TAKE 1 TABLET EVERY DAY 90 tablet 3    DULoxetine (CYMBALTA) 60 MG extended release capsule TAKE 1 CAPSULE EVERY DAY 90 capsule 3    potassium chloride (MICRO-K) 10 MEQ extended release capsule Take 1 capsule by mouth 3 times daily Taking 3 times daily 270 capsule 3    triamterene-hydroCHLOROthiazide (MAXZIDE) 75-50 MG per tablet 1 po qd prn edema 90 tablet 3    olmesartan (BENICAR) 20 MG tablet TAKE 1 TABLET EVERY DAY 90 tablet 3    pravastatin (PRAVACHOL) 10 MG tablet TAKE 1 TABLET EVERY DAY 90 tablet 3    brimonidine (ALPHAGAN) 0.2 % ophthalmic solution Place 1 drop into both eyes 3 times daily       LUTEIN PO Take by mouth       dorzolamide (TRUSOPT) 2 % ophthalmic solution Place 1 drop into both eyes 3 times daily       Glucosamine-Chondroit-Vit C-Mn (GLUCOSAMINE CHONDR 500 COMPLEX PO) Take by mouth       MULTIPLE VITAMIN PO Take by mouth        acetaminophen (TYLENOL) 500 MG tablet Take 1,000 mg by mouth three times daily        No current facility-administered medications for this visit. Return in about 3 months (around 4/9/2023).

## 2023-01-09 NOTE — ASSESSMENT & PLAN NOTE
Asymptomatic. No antiarrhythmics due to bradycardia. Remains on Coumadin for anticoagulation. Standing order for INR placed in computer as POCT INRs have becoming increasingly more difficult with patient's neuropathy. Currently on Coumadin 5 mg 5 days/week and 2.5 mg 2 days/week. Due for repeat INR next week.

## 2023-01-18 DIAGNOSIS — I48.11 LONGSTANDING PERSISTENT ATRIAL FIBRILLATION (HCC): ICD-10-CM

## 2023-01-18 LAB
INR BLD: 2.05 (ref 0.87–1.14)
PROTHROMBIN TIME: 23.2 SEC (ref 11.7–14.5)

## 2023-01-28 DIAGNOSIS — E78.5 HYPERLIPIDEMIA, UNSPECIFIED HYPERLIPIDEMIA TYPE: ICD-10-CM

## 2023-01-28 DIAGNOSIS — I10 ESSENTIAL HYPERTENSION: ICD-10-CM

## 2023-01-30 RX ORDER — TRIAMTERENE AND HYDROCHLOROTHIAZIDE 75; 50 MG/1; MG/1
TABLET ORAL
Qty: 90 TABLET | Refills: 3 | Status: SHIPPED | OUTPATIENT
Start: 2023-01-30

## 2023-01-30 RX ORDER — PRAVASTATIN SODIUM 10 MG
TABLET ORAL
Qty: 90 TABLET | Refills: 3 | Status: SHIPPED | OUTPATIENT
Start: 2023-01-30

## 2023-02-23 DIAGNOSIS — I48.11 LONGSTANDING PERSISTENT ATRIAL FIBRILLATION (HCC): ICD-10-CM

## 2023-02-23 LAB
INR BLD: 2.22 (ref 0.87–1.14)
PROTHROMBIN TIME: 24.6 SEC (ref 11.7–14.5)

## 2023-03-26 DIAGNOSIS — E78.5 HYPERLIPIDEMIA, UNSPECIFIED HYPERLIPIDEMIA TYPE: ICD-10-CM

## 2023-03-26 DIAGNOSIS — I10 ESSENTIAL HYPERTENSION: ICD-10-CM

## 2023-03-27 RX ORDER — OLMESARTAN MEDOXOMIL 20 MG/1
TABLET ORAL
Qty: 90 TABLET | Refills: 3 | Status: SHIPPED | OUTPATIENT
Start: 2023-03-27

## 2023-03-27 RX ORDER — POTASSIUM CHLORIDE 750 MG/1
CAPSULE, EXTENDED RELEASE ORAL
Qty: 270 CAPSULE | Refills: 3 | Status: SHIPPED | OUTPATIENT
Start: 2023-03-27

## 2023-04-03 DIAGNOSIS — R73.9 HYPERGLYCEMIA: ICD-10-CM

## 2023-04-03 DIAGNOSIS — I10 ESSENTIAL HYPERTENSION: ICD-10-CM

## 2023-04-03 DIAGNOSIS — E78.5 HYPERLIPIDEMIA LDL GOAL <100: ICD-10-CM

## 2023-04-03 DIAGNOSIS — I48.11 LONGSTANDING PERSISTENT ATRIAL FIBRILLATION (HCC): ICD-10-CM

## 2023-04-03 DIAGNOSIS — G60.9 IDIOPATHIC PERIPHERAL NEUROPATHY: ICD-10-CM

## 2023-04-03 LAB
ALBUMIN SERPL-MCNC: 3.9 G/DL (ref 3.4–5)
ALBUMIN/GLOB SERPL: 1.3 {RATIO} (ref 1.1–2.2)
ALP SERPL-CCNC: 82 U/L (ref 40–129)
ALT SERPL-CCNC: 12 U/L (ref 10–40)
ANION GAP SERPL CALCULATED.3IONS-SCNC: 15 MMOL/L (ref 3–16)
AST SERPL-CCNC: 17 U/L (ref 15–37)
BASOPHILS # BLD: 0.1 K/UL (ref 0–0.2)
BASOPHILS NFR BLD: 0.6 %
BILIRUB SERPL-MCNC: 0.5 MG/DL (ref 0–1)
BUN SERPL-MCNC: 27 MG/DL (ref 7–20)
CALCIUM SERPL-MCNC: 9.3 MG/DL (ref 8.3–10.6)
CHLORIDE SERPL-SCNC: 101 MMOL/L (ref 99–110)
CHOLEST SERPL-MCNC: 161 MG/DL (ref 0–199)
CO2 SERPL-SCNC: 23 MMOL/L (ref 21–32)
CREAT SERPL-MCNC: 0.9 MG/DL (ref 0.6–1.2)
DEPRECATED RDW RBC AUTO: 14.1 % (ref 12.4–15.4)
EOSINOPHIL # BLD: 0.5 K/UL (ref 0–0.6)
EOSINOPHIL NFR BLD: 5.2 %
FOLATE SERPL-MCNC: >20 NG/ML (ref 4.78–24.2)
GFR SERPLBLD CREATININE-BSD FMLA CKD-EPI: 59 ML/MIN/{1.73_M2}
GLUCOSE SERPL-MCNC: 119 MG/DL (ref 70–99)
HCT VFR BLD AUTO: 40.9 % (ref 36–48)
HDLC SERPL-MCNC: 45 MG/DL (ref 40–60)
HGB BLD-MCNC: 13.7 G/DL (ref 12–16)
LDLC SERPL CALC-MCNC: 81 MG/DL
LYMPHOCYTES # BLD: 2.2 K/UL (ref 1–5.1)
LYMPHOCYTES NFR BLD: 21.5 %
MCH RBC QN AUTO: 32.1 PG (ref 26–34)
MCHC RBC AUTO-ENTMCNC: 33.4 G/DL (ref 31–36)
MCV RBC AUTO: 96.2 FL (ref 80–100)
MONOCYTES # BLD: 0.7 K/UL (ref 0–1.3)
MONOCYTES NFR BLD: 6.9 %
NEUTROPHILS # BLD: 6.7 K/UL (ref 1.7–7.7)
NEUTROPHILS NFR BLD: 65.8 %
PLATELET # BLD AUTO: 234 K/UL (ref 135–450)
PMV BLD AUTO: 9.8 FL (ref 5–10.5)
POTASSIUM SERPL-SCNC: 3.6 MMOL/L (ref 3.5–5.1)
PROT SERPL-MCNC: 7 G/DL (ref 6.4–8.2)
RBC # BLD AUTO: 4.25 M/UL (ref 4–5.2)
SODIUM SERPL-SCNC: 139 MMOL/L (ref 136–145)
TRIGL SERPL-MCNC: 176 MG/DL (ref 0–150)
VIT B12 SERPL-MCNC: 1511 PG/ML (ref 211–911)
VLDLC SERPL CALC-MCNC: 35 MG/DL
WBC # BLD AUTO: 10.2 K/UL (ref 4–11)

## 2023-04-04 DIAGNOSIS — I48.11 LONGSTANDING PERSISTENT ATRIAL FIBRILLATION (HCC): ICD-10-CM

## 2023-04-04 LAB
EST. AVERAGE GLUCOSE BLD GHB EST-MCNC: 125.5 MG/DL
HBA1C MFR BLD: 6 %
INR PPP: 1.89 (ref 0.84–1.16)
PROTHROMBIN TIME: 21.6 SEC (ref 11.5–14.8)

## 2023-04-16 PROBLEM — E11.9 TYPE 2 DIABETES MELLITUS WITHOUT COMPLICATION, WITHOUT LONG-TERM CURRENT USE OF INSULIN (HCC): Status: ACTIVE | Noted: 2023-04-16

## 2023-05-09 ENCOUNTER — PATIENT MESSAGE (OUTPATIENT)
Dept: INTERNAL MEDICINE CLINIC | Age: 88
End: 2023-05-09

## 2023-05-10 RX ORDER — DULOXETIN HYDROCHLORIDE 60 MG/1
60 CAPSULE, DELAYED RELEASE ORAL DAILY
Qty: 90 CAPSULE | Refills: 3 | Status: SHIPPED | OUTPATIENT
Start: 2023-05-10

## 2023-05-10 RX ORDER — DULOXETIN HYDROCHLORIDE 30 MG/1
CAPSULE, DELAYED RELEASE ORAL
Qty: 90 CAPSULE | Refills: 3 | Status: SHIPPED | OUTPATIENT
Start: 2023-05-10

## 2023-05-10 NOTE — TELEPHONE ENCOUNTER
From: Thai Kirk  To: Dr. Rose Gains: 5/9/2023 7:04 PM EDT  Subject: refill Duloxetine DR 60mg    Please, please, I want to get off of this [address]. I have been on for the last month trying to get my Duloxetine 60mg refilled.   PLEASE send them a new prescription  Thank you   I am having my INR done Thursday   Deanna

## 2023-05-11 DIAGNOSIS — I48.11 LONGSTANDING PERSISTENT ATRIAL FIBRILLATION (HCC): ICD-10-CM

## 2023-05-11 LAB
INR PPP: 1.96 (ref 0.84–1.16)
PROTHROMBIN TIME: 22.2 SEC (ref 11.5–14.8)

## 2023-06-14 DIAGNOSIS — I48.11 LONGSTANDING PERSISTENT ATRIAL FIBRILLATION (HCC): ICD-10-CM

## 2023-06-14 LAB
INR PPP: 2.44 (ref 0.84–1.16)
PROTHROMBIN TIME: 26.3 SEC (ref 11.5–14.8)

## 2023-07-10 DIAGNOSIS — I48.11 LONGSTANDING PERSISTENT ATRIAL FIBRILLATION (HCC): ICD-10-CM

## 2023-07-10 DIAGNOSIS — E11.9 TYPE 2 DIABETES MELLITUS WITHOUT COMPLICATION, WITHOUT LONG-TERM CURRENT USE OF INSULIN (HCC): ICD-10-CM

## 2023-07-11 LAB
ALBUMIN SERPL-MCNC: 4.1 G/DL (ref 3.4–5)
ALBUMIN/GLOB SERPL: 1.3 {RATIO} (ref 1.1–2.2)
ALP SERPL-CCNC: 91 U/L (ref 40–129)
ALT SERPL-CCNC: 11 U/L (ref 10–40)
ANION GAP SERPL CALCULATED.3IONS-SCNC: 13 MMOL/L (ref 3–16)
AST SERPL-CCNC: 15 U/L (ref 15–37)
BILIRUB SERPL-MCNC: 0.3 MG/DL (ref 0–1)
BUN SERPL-MCNC: 31 MG/DL (ref 7–20)
CALCIUM SERPL-MCNC: 10.1 MG/DL (ref 8.3–10.6)
CHLORIDE SERPL-SCNC: 105 MMOL/L (ref 99–110)
CO2 SERPL-SCNC: 25 MMOL/L (ref 21–32)
CREAT SERPL-MCNC: 1 MG/DL (ref 0.6–1.2)
EST. AVERAGE GLUCOSE BLD GHB EST-MCNC: 125.5 MG/DL
GFR SERPLBLD CREATININE-BSD FMLA CKD-EPI: 52 ML/MIN/{1.73_M2}
GLUCOSE SERPL-MCNC: 117 MG/DL (ref 70–99)
HBA1C MFR BLD: 6 %
INR PPP: 2.31 (ref 0.84–1.16)
POTASSIUM SERPL-SCNC: 3.7 MMOL/L (ref 3.5–5.1)
PROT SERPL-MCNC: 7.2 G/DL (ref 6.4–8.2)
PROTHROMBIN TIME: 25.3 SEC (ref 11.5–14.8)
SODIUM SERPL-SCNC: 143 MMOL/L (ref 136–145)

## 2023-07-17 ENCOUNTER — OFFICE VISIT (OUTPATIENT)
Dept: INTERNAL MEDICINE CLINIC | Age: 88
End: 2023-07-17

## 2023-07-17 VITALS
SYSTOLIC BLOOD PRESSURE: 118 MMHG | WEIGHT: 177.2 LBS | OXYGEN SATURATION: 98 % | BODY MASS INDEX: 29.49 KG/M2 | HEART RATE: 91 BPM | DIASTOLIC BLOOD PRESSURE: 65 MMHG

## 2023-07-17 VITALS
DIASTOLIC BLOOD PRESSURE: 65 MMHG | HEART RATE: 91 BPM | WEIGHT: 177.25 LBS | BODY MASS INDEX: 29.5 KG/M2 | SYSTOLIC BLOOD PRESSURE: 118 MMHG | OXYGEN SATURATION: 98 %

## 2023-07-17 DIAGNOSIS — E11.9 TYPE 2 DIABETES MELLITUS WITHOUT COMPLICATION, WITHOUT LONG-TERM CURRENT USE OF INSULIN (HCC): ICD-10-CM

## 2023-07-17 DIAGNOSIS — R73.9 HYPERGLYCEMIA: ICD-10-CM

## 2023-07-17 DIAGNOSIS — E78.5 HYPERLIPIDEMIA LDL GOAL <100: ICD-10-CM

## 2023-07-17 DIAGNOSIS — Z00.00 MEDICARE ANNUAL WELLNESS VISIT, SUBSEQUENT: Primary | ICD-10-CM

## 2023-07-17 DIAGNOSIS — I10 ESSENTIAL HYPERTENSION: ICD-10-CM

## 2023-07-17 DIAGNOSIS — N18.31 STAGE 3A CHRONIC KIDNEY DISEASE (HCC): Primary | ICD-10-CM

## 2023-07-17 DIAGNOSIS — I48.11 LONGSTANDING PERSISTENT ATRIAL FIBRILLATION (HCC): ICD-10-CM

## 2023-07-17 DIAGNOSIS — G60.9 IDIOPATHIC PERIPHERAL NEUROPATHY: ICD-10-CM

## 2023-07-17 DIAGNOSIS — R44.1 HALLUCINATIONS, VISUAL: ICD-10-CM

## 2023-07-17 SDOH — ECONOMIC STABILITY: INCOME INSECURITY: HOW HARD IS IT FOR YOU TO PAY FOR THE VERY BASICS LIKE FOOD, HOUSING, MEDICAL CARE, AND HEATING?: NOT HARD AT ALL

## 2023-07-17 SDOH — ECONOMIC STABILITY: FOOD INSECURITY: WITHIN THE PAST 12 MONTHS, THE FOOD YOU BOUGHT JUST DIDN'T LAST AND YOU DIDN'T HAVE MONEY TO GET MORE.: NEVER TRUE

## 2023-07-17 SDOH — ECONOMIC STABILITY: FOOD INSECURITY: WITHIN THE PAST 12 MONTHS, YOU WORRIED THAT YOUR FOOD WOULD RUN OUT BEFORE YOU GOT MONEY TO BUY MORE.: NEVER TRUE

## 2023-07-17 ASSESSMENT — PATIENT HEALTH QUESTIONNAIRE - PHQ9
SUM OF ALL RESPONSES TO PHQ QUESTIONS 1-9: 0
2. FEELING DOWN, DEPRESSED OR HOPELESS: 0
SUM OF ALL RESPONSES TO PHQ9 QUESTIONS 1 & 2: 0
SUM OF ALL RESPONSES TO PHQ QUESTIONS 1-9: 0
1. LITTLE INTEREST OR PLEASURE IN DOING THINGS: 0

## 2023-07-17 ASSESSMENT — LIFESTYLE VARIABLES
HOW OFTEN DO YOU HAVE A DRINK CONTAINING ALCOHOL: NEVER
HOW MANY STANDARD DRINKS CONTAINING ALCOHOL DO YOU HAVE ON A TYPICAL DAY: PATIENT DOES NOT DRINK

## 2023-07-17 NOTE — PROGRESS NOTES
Patient: Goran Hanna is a 80 y.o. female who presents today with the following Chief Complaint(s):  Chief Complaint   Patient presents with    Follow-up     Discuss Blood work        HPI    Here today for follow up. Getting new hearing aids. Working with getting them adjusted. Does not want to have ears checked today. Does have wax in her right ear that she will get cleaned out by her audiologist.     Neuropathy does make her ankles and toes tight at times. Was given some exercises to help with stiffness. Cymbalta is helping to keep her neuropathy symptoms under control. Doesn't always wear shoes with a sole. HTN- no issues with amlodipine, Maxzide,  and olmesartan. HLD- doing well on pravastatin. Still having visual hallucinations (snow). Does not bother her. Rarely will see people/animals but tends to happen when she is sleeping.       Results for orders placed or performed in visit on 07/10/23   Comprehensive Metabolic Panel   Result Value Ref Range    Sodium 143 136 - 145 mmol/L    Potassium 3.7 3.5 - 5.1 mmol/L    Chloride 105 99 - 110 mmol/L    CO2 25 21 - 32 mmol/L    Anion Gap 13 3 - 16    Glucose 117 (H) 70 - 99 mg/dL    BUN 31 (H) 7 - 20 mg/dL    Creatinine 1.0 0.6 - 1.2 mg/dL    Est, Glom Filt Rate 52 (A) >60    Calcium 10.1 8.3 - 10.6 mg/dL    Total Protein 7.2 6.4 - 8.2 g/dL    Albumin 4.1 3.4 - 5.0 g/dL    Albumin/Globulin Ratio 1.3 1.1 - 2.2    Total Bilirubin 0.3 0.0 - 1.0 mg/dL    Alkaline Phosphatase 91 40 - 129 U/L    ALT 11 10 - 40 U/L    AST 15 15 - 37 U/L   Hemoglobin A1C   Result Value Ref Range    Hemoglobin A1C 6.0 See comment %    eAG 125.5 mg/dL   Protime-INR   Result Value Ref Range    Protime 25.3 (H) 11.5 - 14.8 sec    INR 2.31 (H) 0.84 - 1.16        Allergies   Allergen Reactions    Ace Inhibitors      cough    Catapres [Clonidine Hcl]      patch    Diovan [Valsartan]      ineffective    Eszopiclone      ineffective    Morphine      All derivatives- GI upset

## 2023-07-17 NOTE — PATIENT INSTRUCTIONS
Personalized Preventive Plan for Maye Sullivan - 7/17/2023  Medicare offers a range of preventive health benefits. Some of the tests and screenings are paid in full while other may be subject to a deductible, co-insurance, and/or copay. Some of these benefits include a comprehensive review of your medical history including lifestyle, illnesses that may run in your family, and various assessments and screenings as appropriate. After reviewing your medical record and screening and assessments performed today your provider may have ordered immunizations, labs, imaging, and/or referrals for you. A list of these orders (if applicable) as well as your Preventive Care list are included within your After Visit Summary for your review. Other Preventive Recommendations:    A preventive eye exam performed by an eye specialist is recommended every 1-2 years to screen for glaucoma; cataracts, macular degeneration, and other eye disorders. A preventive dental visit is recommended every 6 months. Try to get at least 150 minutes of exercise per week or 10,000 steps per day on a pedometer . Order or download the FREE \"Exercise & Physical Activity: Your Everyday Guide\" from [employer]. Call 5-279.829.2447 or search [employer] online. You need 0627-8410 mg of calcium and 8545-9387 IU of vitamin D per day. It is possible to meet your calcium requirement with diet alone, but a vitamin D supplement is usually necessary to meet this goal.  When exposed to the sun, use a sunscreen that protects against both UVA and UVB radiation with an SPF of 30 or greater. Reapply every 2 to 3 hours or after sweating, drying off with a towel, or swimming. Always wear a seat belt when traveling in a car. Always wear a helmet when riding a bicycle or motorcycle.

## 2023-07-18 NOTE — ASSESSMENT & PLAN NOTE
Blood pressure is well controlled. Continue amlodipine 5 mg daily, Maxzide 75/50 mg daily, and Benicar 20 mg daily.

## 2023-07-18 NOTE — ASSESSMENT & PLAN NOTE
Continues to see snow. Not bothered by this hallucination. Does occasionally have more vivid hallucinations that tend to occur in the middle of the night when she is sleeping which raises the question of possible vivid dreams.   Again, patient is not bothered by these hallucinations and is aware that they are not real.

## 2023-07-18 NOTE — ASSESSMENT & PLAN NOTE
Asymptomatic. Not on antiarrhythmics due to bradycardia. On Coumadin for anticoagulation. Most recent INR was 2.31. Repeat in 4 weeks.

## 2023-07-18 NOTE — ASSESSMENT & PLAN NOTE
Symptomatically controlled with Cymbalta 90 mg daily. May use Aspercreme with lidocaine for additional pain control. Recommend against going barefoot or sock footed. Recommend using slippers with firm soles to prevent accidental foot injury. Skin is very dry and recommend using petroleum jelly at nighttime.

## 2023-08-14 DIAGNOSIS — I48.11 LONGSTANDING PERSISTENT ATRIAL FIBRILLATION (HCC): ICD-10-CM

## 2023-08-15 LAB
INR PPP: 2.17 (ref 0.84–1.16)
PROTHROMBIN TIME: 24 SEC (ref 11.5–14.8)

## 2023-08-16 DIAGNOSIS — I10 ESSENTIAL HYPERTENSION: ICD-10-CM

## 2023-08-16 RX ORDER — AMLODIPINE BESYLATE 5 MG/1
TABLET ORAL
Qty: 90 TABLET | Refills: 1 | Status: SHIPPED | OUTPATIENT
Start: 2023-08-16

## 2023-09-08 RX ORDER — DULOXETIN HYDROCHLORIDE 30 MG/1
CAPSULE, DELAYED RELEASE ORAL
Qty: 90 CAPSULE | Refills: 3 | Status: SHIPPED | OUTPATIENT
Start: 2023-09-08

## 2023-09-21 DIAGNOSIS — I48.11 LONGSTANDING PERSISTENT ATRIAL FIBRILLATION (HCC): ICD-10-CM

## 2023-09-21 LAB
INR PPP: 2.3 (ref 0.84–1.16)
PROTHROMBIN TIME: 25.2 SEC (ref 11.5–14.8)

## 2023-10-16 ENCOUNTER — OFFICE VISIT (OUTPATIENT)
Dept: INTERNAL MEDICINE CLINIC | Age: 88
End: 2023-10-16

## 2023-10-16 VITALS
HEIGHT: 65 IN | SYSTOLIC BLOOD PRESSURE: 124 MMHG | BODY MASS INDEX: 29.79 KG/M2 | OXYGEN SATURATION: 97 % | HEART RATE: 72 BPM | WEIGHT: 178.8 LBS | TEMPERATURE: 97.8 F | DIASTOLIC BLOOD PRESSURE: 78 MMHG

## 2023-10-16 DIAGNOSIS — H60.502 ACUTE OTITIS EXTERNA OF LEFT EAR, UNSPECIFIED TYPE: Primary | ICD-10-CM

## 2023-10-16 RX ORDER — CIPROFLOXACIN AND DEXAMETHASONE 3; 1 MG/ML; MG/ML
4 SUSPENSION/ DROPS AURICULAR (OTIC) 2 TIMES DAILY
Qty: 7.5 ML | Refills: 0 | Status: SHIPPED | OUTPATIENT
Start: 2023-10-16 | End: 2023-10-23

## 2023-10-16 NOTE — PROGRESS NOTES
Leslie Dueñas (:  [id number]) is a 80 y.o. female,Established patient, here for evaluation of the following chief complaint(s):  Otalgia (Pt c/o left ear pain/ infection  following ear appt 10/16/23 )    ASSESSMENT/PLAN:  1. Acute otitis externa of left ear, unspecified type  Assessment & Plan:  Start Ciprodex, 4 drops into left ear twice daily for 1 week. Although there is cerumen on TM I advised against using Debrox until infection is clear. She can follow-up in office in 1 week with me for repeat examination. Can further advise on cerumen removal then. Advised to not use hearing aid in left ear until symptoms have resolved and treatment is complete. Avoid submerging head in water while symptomatic and on treatment. Orders:  -     ciprofloxacin-dexamethasone (CIPRODEX) 0.3-0.1 % otic suspension; Place 4 drops into the left ear 2 times daily for 7 days, Disp-7.5 mL, R-0Normal      Return if symptoms worsen or fail to improve. SUBJECTIVE/OBJECTIVE:  HPI  80y.o. year old female here for an acute visit. Accompanied by her son. Was at a hearing aid visit today and was told it looks like she has an ear infection in the left ear. Was also told that there is wax in this ear. States that it has been uncomfortable to put in her hearing aid this year for a few days. Denies fevers or recent illness. Current Outpatient Medications   Medication Sig Dispense Refill    ciprofloxacin-dexamethasone (CIPRODEX) 0.3-0.1 % otic suspension Place 4 drops into the left ear 2 times daily for 7 days 7.5 mL 0    DULoxetine (CYMBALTA) 30 MG extended release capsule TAKE 1 CAPSULE DAILY (TOTAL DOSE 90 MG DAILY).  90 capsule 3    amLODIPine (NORVASC) 5 MG tablet TAKE 1 TABLET EVERY DAY 90 tablet 1    DULoxetine (CYMBALTA) 60 MG extended release capsule Take 1 capsule by mouth daily Total 90 mg qd 90 capsule 3    potassium chloride (MICRO-K) 10 MEQ extended release capsule TAKE 1 CAPSULE THREE TIMES DAILY 270 capsule 3

## 2023-10-16 NOTE — ASSESSMENT & PLAN NOTE
Start Ciprodex, 4 drops into left ear twice daily for 1 week. Although there is cerumen on TM I advised against using Debrox until infection is clear. She can follow-up in office in 1 week with me for repeat examination. Can further advise on cerumen removal then. Advised to not use hearing aid in left ear until symptoms have resolved and treatment is complete. Avoid submerging head in water while symptomatic and on treatment.

## 2023-10-17 ENCOUNTER — PATIENT MESSAGE (OUTPATIENT)
Dept: INTERNAL MEDICINE CLINIC | Age: 88
End: 2023-10-17

## 2023-10-17 NOTE — TELEPHONE ENCOUNTER
From: Mikel Alebrt  To: Jesus Mazariegos  Sent: 10/17/2023 10:02 AM EDT  Subject: EAR DROPS David Chin     Did not have ear drops filled, too costly what do you suggest?   Juan Carlos Reese

## 2023-10-30 ENCOUNTER — OFFICE VISIT (OUTPATIENT)
Dept: INTERNAL MEDICINE CLINIC | Age: 88
End: 2023-10-30

## 2023-10-30 VITALS
BODY MASS INDEX: 29.55 KG/M2 | SYSTOLIC BLOOD PRESSURE: 138 MMHG | DIASTOLIC BLOOD PRESSURE: 62 MMHG | OXYGEN SATURATION: 94 % | WEIGHT: 177.6 LBS | HEART RATE: 61 BPM

## 2023-10-30 DIAGNOSIS — I48.11 LONGSTANDING PERSISTENT ATRIAL FIBRILLATION (HCC): Primary | ICD-10-CM

## 2023-10-30 DIAGNOSIS — Z79.01 ANTICOAGULATED ON COUMADIN: ICD-10-CM

## 2023-10-30 DIAGNOSIS — E55.9 VITAMIN D INSUFFICIENCY: ICD-10-CM

## 2023-10-30 DIAGNOSIS — H91.90 HEARING LOSS, UNSPECIFIED HEARING LOSS TYPE, UNSPECIFIED LATERALITY: ICD-10-CM

## 2023-10-30 DIAGNOSIS — E11.9 TYPE 2 DIABETES MELLITUS WITHOUT COMPLICATION, WITHOUT LONG-TERM CURRENT USE OF INSULIN (HCC): ICD-10-CM

## 2023-10-30 DIAGNOSIS — I87.2 VENOUS INSUFFICIENCY: ICD-10-CM

## 2023-10-30 DIAGNOSIS — I10 ESSENTIAL HYPERTENSION: ICD-10-CM

## 2023-10-30 DIAGNOSIS — E78.5 HYPERLIPIDEMIA LDL GOAL <100: ICD-10-CM

## 2023-10-30 DIAGNOSIS — I48.11 LONGSTANDING PERSISTENT ATRIAL FIBRILLATION (HCC): ICD-10-CM

## 2023-10-30 DIAGNOSIS — G60.9 IDIOPATHIC PERIPHERAL NEUROPATHY: ICD-10-CM

## 2023-10-30 DIAGNOSIS — N18.31 STAGE 3A CHRONIC KIDNEY DISEASE (HCC): ICD-10-CM

## 2023-10-30 DIAGNOSIS — H61.22 EXCESSIVE CERUMEN IN EAR CANAL, LEFT: ICD-10-CM

## 2023-10-30 PROBLEM — H60.502 ACUTE OTITIS EXTERNA OF LEFT EAR: Status: RESOLVED | Noted: 2023-10-16 | Resolved: 2023-10-30

## 2023-10-31 LAB
INR PPP: 2.21 (ref 0.84–1.16)
PROTHROMBIN TIME: 24.4 SEC (ref 11.5–14.8)

## 2023-11-09 NOTE — PATIENT INSTRUCTIONS
Try changing Cymbalta to taking the full 90 mg in the evening. If that change does not make a difference with your fatigue, you may change it back to 30 mg am/60 mg pm (or keep it at 90 mg daily). no

## 2023-11-11 PROBLEM — H61.22 EXCESSIVE CERUMEN IN EAR CANAL, LEFT: Status: ACTIVE | Noted: 2023-11-11

## 2023-11-11 NOTE — ASSESSMENT & PLAN NOTE
Asymptomatic. On no antiarrhythmics due to bradycardia. Remains on Coumadin for anticoagulation. Discussed Coumadin diet.

## 2023-11-11 NOTE — ASSESSMENT & PLAN NOTE
Discussed current diet with patient. Discussed that greens will thicken her blood. Discussed need to consistently eat the same amount of greens. Continue Coumadin 5 mg 5 days/week and 2.5 mg 2 days/week. Repeat INR today.

## 2023-11-23 DIAGNOSIS — E78.5 HYPERLIPIDEMIA, UNSPECIFIED HYPERLIPIDEMIA TYPE: ICD-10-CM

## 2023-11-23 DIAGNOSIS — I10 ESSENTIAL HYPERTENSION: ICD-10-CM

## 2023-11-24 RX ORDER — TRIAMTERENE AND HYDROCHLOROTHIAZIDE 75; 50 MG/1; MG/1
TABLET ORAL
Qty: 90 TABLET | Refills: 1 | Status: SHIPPED | OUTPATIENT
Start: 2023-11-24

## 2023-11-24 RX ORDER — PRAVASTATIN SODIUM 10 MG
TABLET ORAL
Qty: 90 TABLET | Refills: 1 | Status: SHIPPED | OUTPATIENT
Start: 2023-11-24

## 2023-12-14 DIAGNOSIS — I48.11 LONGSTANDING PERSISTENT ATRIAL FIBRILLATION (HCC): ICD-10-CM

## 2023-12-14 LAB
INR PPP: 2.21 (ref 0.84–1.16)
PROTHROMBIN TIME: 24.4 SEC (ref 11.5–14.8)

## 2024-01-25 DIAGNOSIS — Z79.01 ANTICOAGULATED ON COUMADIN: Primary | ICD-10-CM

## 2024-01-25 DIAGNOSIS — Z79.01 ANTICOAGULATED ON COUMADIN: ICD-10-CM

## 2024-01-25 LAB
INR PPP: 2.09 (ref 0.84–1.16)
PROTHROMBIN TIME: 23.4 SEC (ref 11.5–14.8)

## 2024-02-04 PROBLEM — N18.30 CHRONIC RENAL DISEASE, STAGE III (HCC): Status: RESOLVED | Noted: 2022-10-03 | Resolved: 2024-02-04

## 2024-02-04 PROBLEM — N18.30 CHRONIC RENAL DISEASE, STAGE III (HCC): Status: RESOLVED | Noted: 2022-07-03 | Resolved: 2024-02-04

## 2024-02-04 PROBLEM — H61.22 EXCESSIVE CERUMEN IN EAR CANAL, LEFT: Status: RESOLVED | Noted: 2023-11-11 | Resolved: 2024-02-04

## 2024-02-05 ENCOUNTER — OFFICE VISIT (OUTPATIENT)
Dept: INTERNAL MEDICINE CLINIC | Age: 89
End: 2024-02-05
Payer: MEDICARE

## 2024-02-05 VITALS
WEIGHT: 178.6 LBS | SYSTOLIC BLOOD PRESSURE: 138 MMHG | OXYGEN SATURATION: 98 % | DIASTOLIC BLOOD PRESSURE: 70 MMHG | BODY MASS INDEX: 29.72 KG/M2 | HEART RATE: 76 BPM

## 2024-02-05 DIAGNOSIS — E78.5 HYPERLIPIDEMIA LDL GOAL <100: ICD-10-CM

## 2024-02-05 DIAGNOSIS — R07.9 CHEST PAIN, UNSPECIFIED TYPE: ICD-10-CM

## 2024-02-05 DIAGNOSIS — I10 ESSENTIAL HYPERTENSION: ICD-10-CM

## 2024-02-05 DIAGNOSIS — E11.9 TYPE 2 DIABETES MELLITUS WITHOUT COMPLICATION, WITHOUT LONG-TERM CURRENT USE OF INSULIN (HCC): Primary | ICD-10-CM

## 2024-02-05 DIAGNOSIS — G60.9 IDIOPATHIC PERIPHERAL NEUROPATHY: ICD-10-CM

## 2024-02-05 DIAGNOSIS — I50.20 SYSTOLIC CONGESTIVE HEART FAILURE, UNSPECIFIED HF CHRONICITY (HCC): ICD-10-CM

## 2024-02-05 DIAGNOSIS — I48.11 LONGSTANDING PERSISTENT ATRIAL FIBRILLATION (HCC): ICD-10-CM

## 2024-02-05 DIAGNOSIS — R44.1 HALLUCINATIONS, VISUAL: ICD-10-CM

## 2024-02-05 DIAGNOSIS — R01.1 MURMUR: ICD-10-CM

## 2024-02-05 PROCEDURE — 99214 OFFICE O/P EST MOD 30 MIN: CPT | Performed by: INTERNAL MEDICINE

## 2024-02-05 PROCEDURE — 1090F PRES/ABSN URINE INCON ASSESS: CPT | Performed by: INTERNAL MEDICINE

## 2024-02-05 PROCEDURE — G8484 FLU IMMUNIZE NO ADMIN: HCPCS | Performed by: INTERNAL MEDICINE

## 2024-02-05 PROCEDURE — G8417 CALC BMI ABV UP PARAM F/U: HCPCS | Performed by: INTERNAL MEDICINE

## 2024-02-05 PROCEDURE — 1123F ACP DISCUSS/DSCN MKR DOCD: CPT | Performed by: INTERNAL MEDICINE

## 2024-02-05 PROCEDURE — 1036F TOBACCO NON-USER: CPT | Performed by: INTERNAL MEDICINE

## 2024-02-05 PROCEDURE — G8427 DOCREV CUR MEDS BY ELIG CLIN: HCPCS | Performed by: INTERNAL MEDICINE

## 2024-02-05 ASSESSMENT — PATIENT HEALTH QUESTIONNAIRE - PHQ9
SUM OF ALL RESPONSES TO PHQ QUESTIONS 1-9: 0
2. FEELING DOWN, DEPRESSED OR HOPELESS: 0
SUM OF ALL RESPONSES TO PHQ QUESTIONS 1-9: 0
1. LITTLE INTEREST OR PLEASURE IN DOING THINGS: 0
SUM OF ALL RESPONSES TO PHQ9 QUESTIONS 1 & 2: 0
SUM OF ALL RESPONSES TO PHQ QUESTIONS 1-9: 0
SUM OF ALL RESPONSES TO PHQ QUESTIONS 1-9: 0

## 2024-02-05 ASSESSMENT — ENCOUNTER SYMPTOMS
DIARRHEA: 0
CONSTIPATION: 0
SHORTNESS OF BREATH: 0
CHEST TIGHTNESS: 0

## 2024-02-05 NOTE — PROGRESS NOTES
Determinants of Health     Financial Resource Strain: Low Risk  (7/17/2023)    Overall Financial Resource Strain (CARDIA)     Difficulty of Paying Living Expenses: Not hard at all   Food Insecurity: Not on file (7/17/2023)   Transportation Needs: Unknown (7/17/2023)    PRAPARE - Transportation     Lack of Transportation (Medical): Not on file     Lack of Transportation (Non-Medical): No   Physical Activity: Inactive (7/17/2023)    Exercise Vital Sign     Days of Exercise per Week: 0 days     Minutes of Exercise per Session: 0 min   Stress: Not on file   Social Connections: Not on file   Intimate Partner Violence: Not on file   Housing Stability: Unknown (7/17/2023)    Housing Stability Vital Sign     Unable to Pay for Housing in the Last Year: Not on file     Number of Places Lived in the Last Year: Not on file     Unstable Housing in the Last Year: No     Family History   Problem Relation Age of Onset    Diabetes Mother     High Blood Pressure Mother     Heart Disease Mother     Diabetes Brother     High Blood Pressure Brother     Heart Disease Brother     Cancer Brother     Rheum Arthritis Daughter         fibromayalgia    Cancer Brother         prostate cancer     Heart Attack Brother         Outpatient Medications Prior to Visit   Medication Sig Dispense Refill    DULoxetine (CYMBALTA) 60 MG extended release capsule TAKE 1 CAPSULE DAILY (TOTAL DOSE 90 MG EVERY DAY) 90 capsule 3    triamterene-hydroCHLOROthiazide (MAXZIDE) 75-50 MG per tablet TAKE 1 TABLET EVERY DAY 90 tablet 1    pravastatin (PRAVACHOL) 10 MG tablet TAKE 1 TABLET EVERY DAY 90 tablet 1    DULoxetine (CYMBALTA) 30 MG extended release capsule TAKE 1 CAPSULE DAILY (TOTAL DOSE 90 MG DAILY). 90 capsule 3    amLODIPine (NORVASC) 5 MG tablet TAKE 1 TABLET EVERY DAY 90 tablet 1    potassium chloride (MICRO-K) 10 MEQ extended release capsule TAKE 1 CAPSULE THREE TIMES DAILY 270 capsule 3    olmesartan (BENICAR) 20 MG tablet TAKE 1 TABLET EVERY DAY 90

## 2024-02-05 NOTE — ASSESSMENT & PLAN NOTE
Murmur is new on exam.  Discussed workup, including echo and referral to cardiology, with patient.  She declines workup as she would not treat an abnormal finding.

## 2024-02-05 NOTE — ASSESSMENT & PLAN NOTE
Patient has been having brief episodes of chest pain in her left upper chest wall that sound consistent with musculoskeletal chest wall pain.  We did discuss that it is possible to be cardiac chest pain.  Recommend going to the emergency room if she has chest pain that lasts for more than 5 to 10 minutes, is associated with shortness of breath and/or nausea and/or sweating.  Patient verbalizes understanding.  Would not be super aggressive in working this up based on her age and her wishes.

## 2024-02-05 NOTE — ASSESSMENT & PLAN NOTE
Continues to see snow.  Not bothered by this hallucination.  Has been having more vivid hallucinations that tend to occur in the middle of the night when she is sleeping which raises the question of possible vivid dreams.  Again, patient is not bothered by these hallucinations and is aware that they are not real.

## 2024-02-16 RX ORDER — WARFARIN SODIUM 5 MG/1
TABLET ORAL
Qty: 90 TABLET | Refills: 3 | Status: SHIPPED | OUTPATIENT
Start: 2024-02-16

## 2024-02-22 DIAGNOSIS — Z79.01 ANTICOAGULATED ON COUMADIN: ICD-10-CM

## 2024-02-22 LAB
INR PPP: 2.07 (ref 0.84–1.16)
PROTHROMBIN TIME: 23.2 SEC (ref 11.5–14.8)

## 2024-03-20 DIAGNOSIS — I10 ESSENTIAL HYPERTENSION: ICD-10-CM

## 2024-03-20 DIAGNOSIS — E78.5 HYPERLIPIDEMIA, UNSPECIFIED HYPERLIPIDEMIA TYPE: ICD-10-CM

## 2024-03-20 RX ORDER — POTASSIUM CHLORIDE 750 MG/1
CAPSULE, EXTENDED RELEASE ORAL
Qty: 270 CAPSULE | Refills: 3 | Status: SHIPPED | OUTPATIENT
Start: 2024-03-20

## 2024-03-20 RX ORDER — AMLODIPINE BESYLATE 5 MG/1
TABLET ORAL
Qty: 90 TABLET | Refills: 3 | Status: SHIPPED | OUTPATIENT
Start: 2024-03-20

## 2024-03-20 RX ORDER — OLMESARTAN MEDOXOMIL 20 MG/1
TABLET ORAL
Qty: 90 TABLET | Refills: 3 | Status: SHIPPED | OUTPATIENT
Start: 2024-03-20

## 2024-03-20 NOTE — TELEPHONE ENCOUNTER
Last OV: 2/5/2024  Next OV: 5/15/2024    Next appointment due:05/05/24    Last fill:amlodipine 08/16/23  Refills:3 #90  Potassium 03/27/23  3 refills  #270  Olmesartan  03/27/23  3 #90

## 2024-04-08 DIAGNOSIS — Z79.01 ANTICOAGULATED ON COUMADIN: ICD-10-CM

## 2024-04-08 LAB
INR PPP: 2.23 (ref 0.85–1.15)
PROTHROMBIN TIME: 24.7 SEC (ref 11.9–14.9)

## 2024-05-15 ENCOUNTER — OFFICE VISIT (OUTPATIENT)
Dept: INTERNAL MEDICINE CLINIC | Age: 89
End: 2024-05-15

## 2024-05-15 VITALS — DIASTOLIC BLOOD PRESSURE: 74 MMHG | HEART RATE: 68 BPM | OXYGEN SATURATION: 97 % | SYSTOLIC BLOOD PRESSURE: 124 MMHG

## 2024-05-15 DIAGNOSIS — E78.5 HYPERLIPIDEMIA LDL GOAL <100: ICD-10-CM

## 2024-05-15 DIAGNOSIS — I50.20 SYSTOLIC CONGESTIVE HEART FAILURE, UNSPECIFIED HF CHRONICITY (HCC): ICD-10-CM

## 2024-05-15 DIAGNOSIS — I48.11 LONGSTANDING PERSISTENT ATRIAL FIBRILLATION (HCC): Primary | ICD-10-CM

## 2024-05-15 DIAGNOSIS — G60.9 IDIOPATHIC PERIPHERAL NEUROPATHY: ICD-10-CM

## 2024-05-15 DIAGNOSIS — I10 ESSENTIAL HYPERTENSION: ICD-10-CM

## 2024-05-15 DIAGNOSIS — E55.9 VITAMIN D INSUFFICIENCY: ICD-10-CM

## 2024-05-15 DIAGNOSIS — N18.31 STAGE 3A CHRONIC KIDNEY DISEASE (HCC): ICD-10-CM

## 2024-05-15 DIAGNOSIS — R44.1 HALLUCINATIONS, VISUAL: ICD-10-CM

## 2024-05-15 DIAGNOSIS — E11.9 TYPE 2 DIABETES MELLITUS WITHOUT COMPLICATION, WITHOUT LONG-TERM CURRENT USE OF INSULIN (HCC): ICD-10-CM

## 2024-05-15 DIAGNOSIS — I48.11 LONGSTANDING PERSISTENT ATRIAL FIBRILLATION (HCC): ICD-10-CM

## 2024-05-15 DIAGNOSIS — Z79.01 ANTICOAGULATED ON COUMADIN: ICD-10-CM

## 2024-05-15 LAB
25(OH)D3 SERPL-MCNC: 62.7 NG/ML
ALBUMIN SERPL-MCNC: 3.9 G/DL (ref 3.4–5)
ALBUMIN/GLOB SERPL: 1.2 {RATIO} (ref 1.1–2.2)
ALP SERPL-CCNC: 95 U/L (ref 40–129)
ALT SERPL-CCNC: 11 U/L (ref 10–40)
ANION GAP SERPL CALCULATED.3IONS-SCNC: 14 MMOL/L (ref 3–16)
AST SERPL-CCNC: 15 U/L (ref 15–37)
BASOPHILS # BLD: 0.1 K/UL (ref 0–0.2)
BASOPHILS NFR BLD: 0.9 %
BILIRUB SERPL-MCNC: 0.5 MG/DL (ref 0–1)
BUN SERPL-MCNC: 29 MG/DL (ref 7–20)
CALCIUM SERPL-MCNC: 10.4 MG/DL (ref 8.3–10.6)
CHLORIDE SERPL-SCNC: 105 MMOL/L (ref 99–110)
CHOLEST SERPL-MCNC: 167 MG/DL (ref 0–199)
CO2 SERPL-SCNC: 23 MMOL/L (ref 21–32)
CREAT SERPL-MCNC: 0.8 MG/DL (ref 0.6–1.2)
DEPRECATED RDW RBC AUTO: 14 % (ref 12.4–15.4)
EOSINOPHIL # BLD: 0.5 K/UL (ref 0–0.6)
EOSINOPHIL NFR BLD: 5 %
FOLATE SERPL-MCNC: >20 NG/ML (ref 4.78–24.2)
GFR SERPLBLD CREATININE-BSD FMLA CKD-EPI: 67 ML/MIN/{1.73_M2}
GLUCOSE SERPL-MCNC: 83 MG/DL (ref 70–99)
HCT VFR BLD AUTO: 40.1 % (ref 36–48)
HDLC SERPL-MCNC: 45 MG/DL (ref 40–60)
HGB BLD-MCNC: 13.6 G/DL (ref 12–16)
INR PPP: 2.15 (ref 0.85–1.15)
LDLC SERPL CALC-MCNC: 83 MG/DL
LYMPHOCYTES # BLD: 2.6 K/UL (ref 1–5.1)
LYMPHOCYTES NFR BLD: 27.1 %
MCH RBC QN AUTO: 32.6 PG (ref 26–34)
MCHC RBC AUTO-ENTMCNC: 34.1 G/DL (ref 31–36)
MCV RBC AUTO: 95.6 FL (ref 80–100)
MONOCYTES # BLD: 0.7 K/UL (ref 0–1.3)
MONOCYTES NFR BLD: 7.8 %
NEUTROPHILS # BLD: 5.7 K/UL (ref 1.7–7.7)
NEUTROPHILS NFR BLD: 59.2 %
PLATELET # BLD AUTO: 239 K/UL (ref 135–450)
PMV BLD AUTO: 9.7 FL (ref 5–10.5)
POTASSIUM SERPL-SCNC: 3.9 MMOL/L (ref 3.5–5.1)
PROT SERPL-MCNC: 7.1 G/DL (ref 6.4–8.2)
PROTHROMBIN TIME: 24 SEC (ref 11.9–14.9)
RBC # BLD AUTO: 4.19 M/UL (ref 4–5.2)
SODIUM SERPL-SCNC: 142 MMOL/L (ref 136–145)
TRIGL SERPL-MCNC: 196 MG/DL (ref 0–150)
VIT B12 SERPL-MCNC: 1853 PG/ML (ref 211–911)
VLDLC SERPL CALC-MCNC: 39 MG/DL
WBC # BLD AUTO: 9.6 K/UL (ref 4–11)

## 2024-05-15 NOTE — PROGRESS NOTES
Patient: Kailyn Johnson is a 89 y.o. female who presents today with the following Chief Complaint(s):  Chief Complaint   Patient presents with    3 Month Follow-Up       HPI    Here today for follow up. Is accompanied by her DIL (Nicola Mobley's wife).     Son with melanoma is doing well. Has completed CTX.     Daughter had spine surgery (extensive fusion) last week at [facility] (lives in [place]). Was told that it may take up to 2 years to fully recovery.     HTN- doing well. No side effects with amlodipine, Maxzide, and Benicar.     Neuropathy- bearable. Still living independently but son (Olayinka) is staying with her currently as he has recently  from his wife.     Had labs done this morning to check on cholesterol, liver and kidneys, sugar.     Still seeing snow. Is having vivid dreams.     Having some difficulty urinating. Better if she is able to \"stretch out the kinks\".     Allergies   Allergen Reactions    Ace Inhibitors      cough    Catapres [Clonidine Hcl]      patch    Diovan [Valsartan]      ineffective    Eszopiclone      ineffective    Morphine      All derivatives- GI upset    Tramadol Hcl      dizziness      Past Medical History:   Diagnosis Date    Arthritis     OA    Atrial fibrillation (HCC)     Blood transfusion     1975    Carpal tunnel syndrome     mild    Dizziness     Edema     Frequent falls     Glaucoma     [place] (hard of hearing)     bilateral hearing aids    Hx of cholelithiasis     Hyperlipidemia     Hypertension     Osteoarthrosis, unspecified whether generalized or localized, unspecified site     Postmenopausal atrophic vaginitis     Unspecified disorder of autonomic nervous system     Unspecified essential hypertension     Unspecified eustachian tube disorder     Unspecified glaucoma(365.9)     Unspecified hemorrhoids without mention of complication     Urethral stricture unspecified       Past Surgical History:   Procedure Laterality Date    COLONOSCOPY      ELBOW SURGERY      left I&D

## 2024-05-16 LAB
EST. AVERAGE GLUCOSE BLD GHB EST-MCNC: 122.6 MG/DL
HBA1C MFR BLD: 5.9 %

## 2024-05-18 PROBLEM — R07.9 CHEST PAIN: Status: RESOLVED | Noted: 2024-02-05 | Resolved: 2024-05-18

## 2024-05-18 ASSESSMENT — ENCOUNTER SYMPTOMS
CHEST TIGHTNESS: 0
SHORTNESS OF BREATH: 0
CONSTIPATION: 0
DIARRHEA: 0

## 2024-05-18 NOTE — ASSESSMENT & PLAN NOTE
Asymptomatic.  On no antiarrhythmics due to bradycardia.  Remains on Coumadin for anticoagulation.

## 2024-05-18 NOTE — ASSESSMENT & PLAN NOTE
Diet controlled.  Would only treat if she were symptomatic for hyperglycemia.  Check hemoglobin A1c.

## 2024-06-15 DIAGNOSIS — E78.5 HYPERLIPIDEMIA, UNSPECIFIED HYPERLIPIDEMIA TYPE: ICD-10-CM

## 2024-06-15 DIAGNOSIS — I10 ESSENTIAL HYPERTENSION: ICD-10-CM

## 2024-06-17 RX ORDER — PRAVASTATIN SODIUM 10 MG
TABLET ORAL
Qty: 90 TABLET | Refills: 3 | Status: SHIPPED | OUTPATIENT
Start: 2024-06-17

## 2024-06-17 RX ORDER — TRIAMTERENE AND HYDROCHLOROTHIAZIDE 75; 50 MG/1; MG/1
TABLET ORAL
Qty: 90 TABLET | Refills: 3 | Status: SHIPPED | OUTPATIENT
Start: 2024-06-17

## 2024-07-09 DIAGNOSIS — Z79.01 ANTICOAGULATED ON COUMADIN: ICD-10-CM

## 2024-07-09 LAB
INR PPP: 2.17 (ref 0.85–1.15)
PROTHROMBIN TIME: 24.2 SEC (ref 11.9–14.9)

## 2024-08-08 ENCOUNTER — APPOINTMENT (OUTPATIENT)
Dept: GENERAL RADIOLOGY | Age: 89
End: 2024-08-08
Payer: MEDICARE

## 2024-08-08 ENCOUNTER — APPOINTMENT (OUTPATIENT)
Dept: CT IMAGING | Age: 89
End: 2024-08-08
Payer: MEDICARE

## 2024-08-08 ENCOUNTER — TELEPHONE (OUTPATIENT)
Dept: INTERNAL MEDICINE CLINIC | Age: 89
End: 2024-08-08

## 2024-08-08 ENCOUNTER — HOSPITAL ENCOUNTER (EMERGENCY)
Age: 89
Discharge: HOME | End: 2024-08-08
Payer: MEDICARE

## 2024-08-08 VITALS
OXYGEN SATURATION: 96 % | TEMPERATURE: 97.9 F | HEART RATE: 60 BPM | SYSTOLIC BLOOD PRESSURE: 144 MMHG | RESPIRATION RATE: 16 BRPM | DIASTOLIC BLOOD PRESSURE: 63 MMHG

## 2024-08-08 DIAGNOSIS — U07.1 COVID-19: Primary | ICD-10-CM

## 2024-08-08 DIAGNOSIS — S09.90XA INJURY OF HEAD, INITIAL ENCOUNTER: ICD-10-CM
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
## 2024-08-08 DIAGNOSIS — N39.0 ACUTE UTI: ICD-10-CM

## 2024-08-08 LAB
ALBUMIN SERPL-MCNC: 3.8 G/DL (ref 3.4–5)
ALBUMIN/GLOB SERPL: 1.2 {RATIO} (ref 1.1–2.2)
ALP SERPL-CCNC: 88 U/L (ref 40–129)
ALT SERPL-CCNC: 21 U/L (ref 10–40)
ANION GAP SERPL CALCULATED.3IONS-SCNC: 12 MMOL/L (ref 3–16)
AST SERPL-CCNC: 24 U/L (ref 15–37)
BACTERIA URNS QL MICRO: ABNORMAL /HPF
BASOPHILS # BLD: 0 K/UL (ref 0–0.2)
BASOPHILS NFR BLD: 0.4 %
BILIRUB SERPL-MCNC: 0.8 MG/DL (ref 0–1)
BILIRUB UR QL STRIP.AUTO: NEGATIVE
BUN SERPL-MCNC: 25 MG/DL (ref 7–20)
CALCIUM SERPL-MCNC: 9.7 MG/DL (ref 8.3–10.6)
CHLORIDE SERPL-SCNC: 100 MMOL/L (ref 99–110)
CLARITY UR: ABNORMAL
CO2 SERPL-SCNC: 21 MMOL/L (ref 21–32)
COLOR UR: ABNORMAL
CREAT SERPL-MCNC: 0.8 MG/DL (ref 0.6–1.2)
DEPRECATED RDW RBC AUTO: 14.1 % (ref 12.4–15.4)
EKG DIAGNOSIS: NORMAL
EKG Q-T INTERVAL: 430 MS
EKG QRS DURATION: 74 MS
EKG QTC CALCULATION (BAZETT): 464 MS
EKG R AXIS: -27 DEGREES
EKG T AXIS: 0 DEGREES
EKG VENTRICULAR RATE: 70 BPM
EOSINOPHIL # BLD: 0.2 K/UL (ref 0–0.6)
EOSINOPHIL NFR BLD: 1.9 %
EPI CELLS #/AREA URNS AUTO: 9 /HPF (ref 0–5)
FLUAV RNA RESP QL NAA+PROBE: NOT DETECTED
FLUBV RNA RESP QL NAA+PROBE: NOT DETECTED
GFR SERPLBLD CREATININE-BSD FMLA CKD-EPI: 67 ML/MIN/{1.73_M2}
GLUCOSE SERPL-MCNC: 103 MG/DL (ref 70–99)
GLUCOSE UR STRIP.AUTO-MCNC: NEGATIVE MG/DL
HCT VFR BLD AUTO: 42.3 % (ref 36–48)
HGB BLD-MCNC: 14.5 G/DL (ref 12–16)
HGB UR QL STRIP.AUTO: NEGATIVE
HYALINE CASTS #/AREA URNS AUTO: 0 /LPF (ref 0–8)
INR PPP: 1.81 (ref 0.85–1.15)
KETONES UR STRIP.AUTO-MCNC: ABNORMAL MG/DL
LEUKOCYTE ESTERASE UR QL STRIP.AUTO: ABNORMAL
LYMPHOCYTES # BLD: 2.2 K/UL (ref 1–5.1)
LYMPHOCYTES NFR BLD: 25.7 %
MAGNESIUM SERPL-MCNC: 2.1 MG/DL (ref 1.8–2.4)
MCH RBC QN AUTO: 32.1 PG (ref 26–34)
MCHC RBC AUTO-ENTMCNC: 34.2 G/DL (ref 31–36)
MCV RBC AUTO: 94.1 FL (ref 80–100)
MONOCYTES # BLD: 0.8 K/UL (ref 0–1.3)
MONOCYTES NFR BLD: 9.7 %
NEUTROPHILS # BLD: 5.3 K/UL (ref 1.7–7.7)
NEUTROPHILS NFR BLD: 62.3 %
NITRITE UR QL STRIP.AUTO: NEGATIVE
PH UR STRIP.AUTO: 6.5 [PH] (ref 5–8)
PLATELET # BLD AUTO: 231 K/UL (ref 135–450)
PMV BLD AUTO: 9.1 FL (ref 5–10.5)
POTASSIUM SERPL-SCNC: 3.5 MMOL/L (ref 3.5–5.1)
PROT SERPL-MCNC: 7.1 G/DL (ref 6.4–8.2)
PROT UR STRIP.AUTO-MCNC: ABNORMAL MG/DL
PROTHROMBIN TIME: 21.1 SEC (ref 11.9–14.9)
RBC # BLD AUTO: 4.5 M/UL (ref 4–5.2)
RBC CLUMPS #/AREA URNS AUTO: 329 /HPF (ref 0–4)
REASON FOR REJECTION: NORMAL
REJECTED TEST: NORMAL
SARS-COV-2 RNA RESP QL NAA+PROBE: DETECTED
SP GR UR STRIP.AUTO: 1.02 (ref 1–1.03)
UA COMPLETE W REFLEX CULTURE PNL UR: YES
UA DIPSTICK W REFLEX MICRO PNL UR: YES
URN SPEC COLLECT METH UR: ABNORMAL
UROBILINOGEN UR STRIP-ACNC: 1 E.U./DL
WBC # BLD AUTO: 8.5 K/UL (ref 4–11)
WBC #/AREA URNS AUTO: 43 /HPF (ref 0–5)

## 2024-08-08 PROCEDURE — 72125 CT NECK SPINE W/O DYE: CPT

## 2024-08-08 PROCEDURE — 71045 X-RAY EXAM CHEST 1 VIEW: CPT

## 2024-08-08 PROCEDURE — 70450 CT HEAD/BRAIN W/O DYE: CPT

## 2024-08-08 PROCEDURE — 6360000002 HC RX W HCPCS

## 2024-08-08 PROCEDURE — 81001 URINALYSIS AUTO W/SCOPE: CPT

## 2024-08-08 PROCEDURE — 6370000000 HC RX 637 (ALT 250 FOR IP)

## 2024-08-08 PROCEDURE — 99285 EMERGENCY DEPT VISIT HI MDM: CPT

## 2024-08-08 PROCEDURE — 85610 PROTHROMBIN TIME: CPT

## 2024-08-08 PROCEDURE — 93005 ELECTROCARDIOGRAM TRACING: CPT

## 2024-08-08 PROCEDURE — 94640 AIRWAY INHALATION TREATMENT: CPT

## 2024-08-08 PROCEDURE — 85025 COMPLETE CBC W/AUTO DIFF WBC: CPT

## 2024-08-08 PROCEDURE — 96374 THER/PROPH/DIAG INJ IV PUSH: CPT

## 2024-08-08 PROCEDURE — 2580000003 HC RX 258

## 2024-08-08 PROCEDURE — 87636 SARSCOV2 & INF A&B AMP PRB: CPT

## 2024-08-08 PROCEDURE — 83735 ASSAY OF MAGNESIUM: CPT

## 2024-08-08 PROCEDURE — 87086 URINE CULTURE/COLONY COUNT: CPT

## 2024-08-08 PROCEDURE — 93010 ELECTROCARDIOGRAM REPORT: CPT | Performed by: INTERNAL MEDICINE

## 2024-08-08 PROCEDURE — 80053 COMPREHEN METABOLIC PANEL: CPT

## 2024-08-08 PROCEDURE — 87088 URINE BACTERIA CULTURE: CPT

## 2024-08-08 RX ORDER — SODIUM CHLORIDE, SODIUM LACTATE, POTASSIUM CHLORIDE, AND CALCIUM CHLORIDE .6; .31; .03; .02 G/100ML; G/100ML; G/100ML; G/100ML
1000 INJECTION, SOLUTION INTRAVENOUS ONCE
Status: COMPLETED | OUTPATIENT
Start: 2024-08-08 | End: 2024-08-08

## 2024-08-08 RX ORDER — IPRATROPIUM BROMIDE AND ALBUTEROL SULFATE 2.5; .5 MG/3ML; MG/3ML
1 SOLUTION RESPIRATORY (INHALATION)
Status: DISCONTINUED | OUTPATIENT
Start: 2024-08-08 | End: 2024-08-08 | Stop reason: HOSPADM

## 2024-08-08 RX ORDER — CEFUROXIME AXETIL 250 MG/1
250 TABLET ORAL 2 TIMES DAILY
Qty: 14 TABLET | Refills: 0 | Status: SHIPPED | OUTPATIENT
Start: 2024-08-08 | End: 2024-08-15

## 2024-08-08 RX ADMIN — SODIUM CHLORIDE, POTASSIUM CHLORIDE, SODIUM LACTATE AND CALCIUM CHLORIDE 1000 ML: 600; 310; 30; 20 INJECTION, SOLUTION INTRAVENOUS at 14:09

## 2024-08-08 RX ADMIN — WATER 40 MG: 1 INJECTION INTRAMUSCULAR; INTRAVENOUS; SUBCUTANEOUS at 16:22

## 2024-08-08 RX ADMIN — IPRATROPIUM BROMIDE AND ALBUTEROL SULFATE 1 DOSE: 2.5; .5 SOLUTION RESPIRATORY (INHALATION) at 16:32

## 2024-08-08 ASSESSMENT — PAIN SCALES - GENERAL: PAINLEVEL_OUTOF10: 0

## 2024-08-08 ASSESSMENT — ENCOUNTER SYMPTOMS
ABDOMINAL PAIN: 0
VOMITING: 0
SHORTNESS OF BREATH: 0
NAUSEA: 0
CHEST TIGHTNESS: 0
COUGH: 1

## 2024-08-08 ASSESSMENT — PAIN - FUNCTIONAL ASSESSMENT: PAIN_FUNCTIONAL_ASSESSMENT: 0-10

## 2024-08-08 NOTE — TELEPHONE ENCOUNTER
Patients daughter in law calling with reports of increased fatigue and lack of appetite, difficulty ambulating. She had a fall last week. Discussed with Dr. Queen due to being a [facility] patient. Since she is on coumadin felt was best to go to ED. Called and advised.

## 2024-08-08 NOTE — ED PROVIDER NOTES
I did not perform history or physical on Kailyn Johnson.  This patient was seen independently by an LUZ ELENA. I did review the EKG, which is documented below.     EKG  The Ekg interpreted by me in the absence of a cardiologist shows.  Atrial fibrillation, rate 70  Axis is   Left axis deviation  QTc is  464  Intervals and Durations are unremarkable.      No specific ST-T wave changes appreciated.  No evidence of acute ischemia.   No significant change from prior EKG, atrial fibrillation present on prior EKG dated 4/9/2022.         Everton Sierra MD  08/08/24 5871    
the following medications:  Medications   methylPREDNISolone sodium succ (SOLU-MEDROL) 40 mg in sterile water 1 mL injection (40 mg IntraVENous Given 8/8/24 1622)   ipratropium 0.5 mg-albuterol 2.5 mg (DUONEB) nebulizer solution 1 Dose (1 Dose Inhalation Given 8/8/24 1632)   lactated ringers bolus 1,000 mL (0 mLs IntraVENous Stopped 8/8/24 1509)             Is this patient to be included in the SEP-1 Core Measure due to severe sepsis or septic shock?   No   Exclusion criteria - the patient is NOT to be included for SEP-1 Core Measure due to:  2+ SIRS criteria are not met  Additionally, viral etiology confirmed with COVID-19 testing    Chronic Conditions affecting care:    has a past medical history of Arthritis, Atrial fibrillation (HCC), Blood transfusion, Carpal tunnel syndrome, Dizziness, Edema, Frequent falls, Glaucoma, [place] (hard of hearing), cholelithiasis, Hyperlipidemia, Hypertension, Osteoarthrosis, unspecified whether generalized or localized, unspecified site, Postmenopausal atrophic vaginitis, Unspecified disorder of autonomic nervous system, Unspecified essential hypertension, Unspecified eustachian tube disorder, Unspecified glaucoma(365.9), Unspecified hemorrhoids without mention of complication, and Urethral stricture unspecified.    CONSULTS: (Who and What was discussed)  None      Records Reviewed (External and Source) EMR reviewed    CC/HPI Summary, DDx, ED Course, and Reassessment: Briefly, this is a 89-year-old female who presents to the emergency department with her son for evaluation of fall last week, 2 to 3 days of fatigue, decrease in appetite, cough as detailed under HPI.  There is no clinical signs of basilar skull fracture or intracranial hemorrhage however CT imaging is obtained and diagnostic workup to rule out a potential for intracranial hemorrhage.  Additionally I did recommend serology, viral testing, chest x-ray, UA to rule out potential infection for the patient's fatigue,

## 2024-08-08 NOTE — DISCHARGE INSTRUCTIONS
Your COVID test was positive.  This is why you are feeling fatigued and why your appetite is a little decreased.    All of your blood work looks good.    Your urine analysis looks like you may have a urinary tract infection.  I sent Ceftin to your pharmacy.   and take as directed.  Make sure to complete entirely.  Do not stop early if symptoms improve or resolve.    No traumatic injuries on CT head or cervical spine.  Chest x-ray does not show pneumonia but does show viral illness which correlates with your COVID-19 test being positive.    Closely follow-up with your primary care doctor.    Please return if worse.

## 2024-08-08 NOTE — ED NOTES
Patient discharged  to home in stable condition via private car.  Discharge instructions and prescriptions reviewed with patient.   Patient verbalized understanding.  All belongings in tow including discharge paperwork.  Home with family.

## 2024-08-09 LAB
BACTERIA UR CULT: ABNORMAL
ORGANISM: ABNORMAL

## 2024-08-12 ENCOUNTER — CARE COORDINATION (OUTPATIENT)
Dept: CARE COORDINATION | Age: 89
End: 2024-08-12

## 2024-08-12 NOTE — CARE COORDINATION
Ambulatory Care Coordination Note     8/12/2024 4:39 PM     Patient outreach attempt by this ACM today to offer care management services. ACM was unable to reach the patient by telephone today; left voice message requesting a return phone call to this ACM.     ACM: Nina Dykes RN     Care Summary Note:     [facility] 8/8; fall/UTI/COVID-19  Attempt #1    PCP/Specialist follow up:   Future Appointments         Provider Specialty Dept Phone    8/19/2024 1:40 PM Montse Johnston,  Internal Medicine [phone]            Follow Up:   Plan for next ACM outreach in approximately 1 week to complete:  - outreach attempt to offer care management services  ED follow up.

## 2024-08-16 ENCOUNTER — CARE COORDINATION (OUTPATIENT)
Dept: CARE COORDINATION | Age: 89
End: 2024-08-16

## 2024-08-16 NOTE — CARE COORDINATION
Ambulatory Care Coordination Note     8/16/2024 10:58 AM     Patient outreach attempt by this ACM today to offer care management services. ACM was unable to reach the patient by telephone today; left voice message requesting a return phone call to this ACM.     ACM: Nina Dykes RN     Care Summary Note:     [facility] 8/8; fall/UTI/COVID-19  Attempt #2        PCP/Specialist follow up:   Future Appointments         Provider Specialty Dept Phone    8/19/2024 1:40 PM Montse Johnston,  Internal Medicine [phone]            Follow Up:   Plan for next ACM outreach in approximately 1 week to complete:  - outreach attempt to offer care management services  -ED follow up

## 2024-08-18 PROBLEM — R73.03 PRE-DIABETES: Status: ACTIVE | Noted: 2023-04-16

## 2024-08-18 NOTE — PROGRESS NOTES
Patient: Kailyn Johnson is a 89 y.o. female who presents today with the following Chief Complaint(s):  Chief Complaint   Patient presents with    3 Month Follow-Up         HPI    Here today for follow up. Accompanied by her DIL (Itz's wife).     Patient was in the ED on 8/8/24 due to fall from chair (missed chair and sat on arm and fell)- did hit her head.   CT of head and cervical spine were unremarkable other than showing advanced DDD of C/S. Was also diagnosed with COVID and UTI. UTI treated with Ceftin. No Paxlovid for COVID.     Since ED visit, is slowly starting to feel better. Every day she is doing a little bit more.   - had decreased appetite with COVID. Is starting to get a little better.   - energy is starting to come back a little since having COVID.  - breathing is ok.     Vision is continuing to worsen. Able to see her computer with computer glasses and magnifier.     Son, Olayinka, is living with her currently.     Having difficulty getting into office to get INR\"s done.     Has rash under her breasts and in her groin.       Lab Results   Component Value Date     (L) 08/08/2024    K 3.5 08/08/2024     08/08/2024    CO2 21 08/08/2024    BUN 25 (H) 08/08/2024    CREATININE 0.8 08/08/2024    GLUCOSE 103 (H) 08/08/2024    CALCIUM 9.7 08/08/2024    BILITOT 0.8 08/08/2024    ALKPHOS 88 08/08/2024    AST 24 08/08/2024    ALT 21 08/08/2024    LABGLOM 67 08/08/2024    GFRAA >60 06/27/2022    AGRATIO 1.2 08/08/2024    GLOB 3.4 07/01/2021       Lab Results   Component Value Date/Time    MG 2.10 08/08/2024 02:09 PM     Lab Results   Component Value Date    WBC 8.5 08/08/2024    HGB 14.5 08/08/2024    HCT 42.3 08/08/2024    MCV 94.1 08/08/2024     08/08/2024     Lab Results   Component Value Date    INR 2.41 (H) 08/19/2024    INR 1.81 (H) 08/08/2024    INR 2.17 (H) 07/09/2024    PROTIME 26.2 (H) 08/19/2024    PROTIME 21.1 (H) 08/08/2024    PROTIME 24.2 (H) 07/09/2024       CT Result (most

## 2024-08-19 ENCOUNTER — OFFICE VISIT (OUTPATIENT)
Dept: INTERNAL MEDICINE CLINIC | Age: 89
End: 2024-08-19

## 2024-08-19 VITALS — OXYGEN SATURATION: 97 % | HEART RATE: 64 BPM | DIASTOLIC BLOOD PRESSURE: 64 MMHG | SYSTOLIC BLOOD PRESSURE: 100 MMHG

## 2024-08-19 DIAGNOSIS — U07.1 COVID: ICD-10-CM

## 2024-08-19 DIAGNOSIS — I10 ESSENTIAL HYPERTENSION: ICD-10-CM

## 2024-08-19 DIAGNOSIS — Z79.01 ANTICOAGULATED ON COUMADIN: ICD-10-CM

## 2024-08-19 DIAGNOSIS — R44.1 HALLUCINATIONS, VISUAL: ICD-10-CM

## 2024-08-19 DIAGNOSIS — R73.03 PRE-DIABETES: Primary | ICD-10-CM

## 2024-08-19 DIAGNOSIS — Z91.81 AT HIGH RISK FOR FALLS: ICD-10-CM

## 2024-08-19 DIAGNOSIS — I50.20 SYSTOLIC CONGESTIVE HEART FAILURE, UNSPECIFIED HF CHRONICITY (HCC): ICD-10-CM

## 2024-08-19 DIAGNOSIS — B36.9 FUNGAL DERMATITIS: ICD-10-CM

## 2024-08-19 DIAGNOSIS — E78.5 HYPERLIPIDEMIA LDL GOAL <100: ICD-10-CM

## 2024-08-19 DIAGNOSIS — G60.9 IDIOPATHIC PERIPHERAL NEUROPATHY: ICD-10-CM

## 2024-08-19 DIAGNOSIS — I48.11 LONGSTANDING PERSISTENT ATRIAL FIBRILLATION (HCC): ICD-10-CM

## 2024-08-19 DIAGNOSIS — N39.3 STRESS INCONTINENCE: ICD-10-CM

## 2024-08-19 LAB
INR PPP: 2.41 (ref 0.85–1.15)
PROTHROMBIN TIME: 26.2 SEC (ref 11.9–14.9)

## 2024-08-19 RX ORDER — NYSTATIN 100000 [USP'U]/G
POWDER TOPICAL
Qty: 120 G | Refills: 5 | Status: SHIPPED | OUTPATIENT
Start: 2024-08-19

## 2024-08-19 RX ORDER — INCONTINENCE PAD,LINER,DISP
1 EACH MISCELLANEOUS 3 TIMES DAILY PRN
Qty: 90 EACH | Refills: 5 | Status: SHIPPED | OUTPATIENT
Start: 2024-08-19

## 2024-08-19 SDOH — ECONOMIC STABILITY: FOOD INSECURITY: WITHIN THE PAST 12 MONTHS, YOU WORRIED THAT YOUR FOOD WOULD RUN OUT BEFORE YOU GOT MONEY TO BUY MORE.: NEVER TRUE

## 2024-08-19 SDOH — ECONOMIC STABILITY: FOOD INSECURITY: WITHIN THE PAST 12 MONTHS, THE FOOD YOU BOUGHT JUST DIDN'T LAST AND YOU DIDN'T HAVE MONEY TO GET MORE.: NEVER TRUE

## 2024-08-19 SDOH — ECONOMIC STABILITY: INCOME INSECURITY: HOW HARD IS IT FOR YOU TO PAY FOR THE VERY BASICS LIKE FOOD, HOUSING, MEDICAL CARE, AND HEATING?: NOT HARD AT ALL

## 2024-08-20 ENCOUNTER — CARE COORDINATION (OUTPATIENT)
Dept: CARE COORDINATION | Age: 89
End: 2024-08-20

## 2024-08-20 PROBLEM — U07.1 COVID: Status: ACTIVE | Noted: 2024-08-20

## 2024-08-20 NOTE — ASSESSMENT & PLAN NOTE
Most recent INR was 1.8 on 8/8/2024.  Remains on Coumadin 5 mg 5 days/week and 2.5 mg 2 days/week.  Check INR today.   Patient's family does struggle getting patient into the office for INR checks as it is very tiring to patient.  Will reach out to care coordination to see if she is eligible for mobile lab draws.

## 2024-08-20 NOTE — ASSESSMENT & PLAN NOTE
Asymptomatic.  On no antiarrhythmics due to bradycardia.  Remains on Coumadin for anticoagulation.  INR in the emergency department on 8/8/2024 was subtherapeutic.  Check INR today.  Unfortunately, patient is not able to do a home POCT INR machine as, due to neuropathy, it is very difficult to get blood on fingersticks from her.  Patient's family does struggle getting patient into the office for INR checks as it is very tiring to patient.  Will reach out to care coordination to see if she is eligible for mobile lab draws.

## 2024-08-20 NOTE — ASSESSMENT & PLAN NOTE
Patient tested positive for COVID in the emergency department on 8/8/2024.  She was treated with an injection of Decadron.  Symptomatically, she is better but is still struggling with fatigue and weakness but is slowly improving.

## 2024-08-20 NOTE — CARE COORDINATION
Ambulatory Care Coordination Note     8/20/2024 12:02 PM     patient outreach attempt by this ACM today to offer care management services. ACM was unable to reach the patient by telephone today; left voice message requesting a return phone call to this ACM. Attempt #3     Patient closed (unable to reach patient) from the High Risk Care Management program on 8/20/2024.  No further Ambulatory Care Manager follow up scheduled.

## 2024-08-20 NOTE — ASSESSMENT & PLAN NOTE
Well-controlled.  Continue Norvasc 5 mg daily, Maxide 75/50 mg daily, and Benicar 20 mg daily.  Blood pressure is running low today but she is asymptomatic.  Continue to monitor blood pressure and consider discontinuing amlodipine if blood pressure is continuing to run low.

## 2024-08-20 NOTE — ASSESSMENT & PLAN NOTE
Stable on Maxide 75/50 mg daily, Benicar 20 mg daily, and amlodipine 5 mg daily.  Blood pressure is running low but she is asymptomatic.  Blood pressure is not typically low.

## 2024-08-20 NOTE — ASSESSMENT & PLAN NOTE
Patient was in the emergency department recently following a fall at home where she tried to sit on her chair and ended up sitting on the arm of her chair.  No significant injury although she does have some bruising.  Walker that she uses at home and a wheelchair that she uses outside of the home.  Falls are related to neuropathy and vision loss.

## 2024-08-20 NOTE — ASSESSMENT & PLAN NOTE
Continues to see snow.  Not bothered by this hallucination.  Also continues to have vivid dreams.

## 2024-09-11 ENCOUNTER — CARE COORDINATION (OUTPATIENT)
Dept: CARE COORDINATION | Age: 89
End: 2024-09-11

## 2024-09-13 ENCOUNTER — CARE COORDINATION (OUTPATIENT)
Dept: CARE COORDINATION | Age: 89
End: 2024-09-13

## 2024-09-19 ENCOUNTER — CARE COORDINATION (OUTPATIENT)
Dept: CARE COORDINATION | Age: 89
End: 2024-09-19

## 2024-10-07 DIAGNOSIS — Z79.01 ANTICOAGULATED ON COUMADIN: ICD-10-CM

## 2024-10-07 LAB
INR PPP: 2.07 (ref 0.85–1.15)
PROTHROMBIN TIME: 23.4 SEC (ref 11.9–14.9)

## 2024-11-11 RX ORDER — DULOXETIN HYDROCHLORIDE 30 MG/1
CAPSULE, DELAYED RELEASE ORAL
Qty: 90 CAPSULE | Refills: 3 | Status: SHIPPED | OUTPATIENT
Start: 2024-11-11

## 2024-11-18 DIAGNOSIS — Z79.01 ANTICOAGULATED ON COUMADIN: ICD-10-CM

## 2024-11-18 RX ORDER — WARFARIN SODIUM 5 MG/1
TABLET ORAL
Qty: 90 TABLET | Refills: 1 | Status: SHIPPED | OUTPATIENT
Start: 2024-11-18

## 2024-11-19 LAB
INR PPP: 2.47 (ref 0.85–1.15)
PROTHROMBIN TIME: 26.8 SEC (ref 11.9–14.9)

## 2024-11-21 ENCOUNTER — OFFICE VISIT (OUTPATIENT)
Dept: INTERNAL MEDICINE CLINIC | Age: 88
End: 2024-11-21

## 2024-11-21 VITALS — HEART RATE: 68 BPM | SYSTOLIC BLOOD PRESSURE: 118 MMHG | DIASTOLIC BLOOD PRESSURE: 60 MMHG | OXYGEN SATURATION: 96 %

## 2024-11-21 DIAGNOSIS — B36.9 FUNGAL DERMATITIS: ICD-10-CM

## 2024-11-21 DIAGNOSIS — E78.5 HYPERLIPIDEMIA LDL GOAL <100: ICD-10-CM

## 2024-11-21 DIAGNOSIS — R44.1 HALLUCINATIONS, VISUAL: ICD-10-CM

## 2024-11-21 DIAGNOSIS — I48.11 LONGSTANDING PERSISTENT ATRIAL FIBRILLATION (HCC): Primary | ICD-10-CM

## 2024-11-21 DIAGNOSIS — I10 ESSENTIAL HYPERTENSION: ICD-10-CM

## 2024-11-21 DIAGNOSIS — I50.20 SYSTOLIC CONGESTIVE HEART FAILURE, UNSPECIFIED HF CHRONICITY (HCC): ICD-10-CM

## 2024-11-21 DIAGNOSIS — R73.03 PRE-DIABETES: ICD-10-CM

## 2024-11-21 DIAGNOSIS — Z79.01 ANTICOAGULATED ON COUMADIN: ICD-10-CM

## 2024-11-21 PROBLEM — U07.1 COVID: Status: RESOLVED | Noted: 2024-08-20 | Resolved: 2024-11-21

## 2024-11-21 RX ORDER — TRIAMTERENE AND HYDROCHLOROTHIAZIDE 37.5; 25 MG/1; MG/1
1 TABLET ORAL DAILY
Qty: 90 TABLET | Refills: 1 | Status: SHIPPED | OUTPATIENT
Start: 2024-11-21

## 2024-11-21 NOTE — PROGRESS NOTES
SURGERY      LAMINECTOMY  12    L  3-4 bilateral laminectomy discectomy      Social History     Socioeconomic History    Marital status:      Spouse name: Not on file    Number of children: Not on file    Years of education: Not on file    Highest education level: Not on file   Occupational History    Not on file   Tobacco Use    Smoking status: Never    Smokeless tobacco: Never   Vaping Use    Vaping status: Never Used   Substance and Sexual Activity    Alcohol use: Yes     Alcohol/week: 0.0 standard drinks of alcohol     Comment: wine with meals occ    Drug use: No    Sexual activity: Not on file   Other Topics Concern    Not on file   Social History Narrative    3  siblings    7 children    3 sons live close by     Social Determinants of Health     Financial Resource Strain: Low Risk  (2024)    Overall Financial Resource Strain (CARDIA)     Difficulty of Paying Living Expenses: Not hard at all   Food Insecurity: No Food Insecurity (2024)    Hunger Vital Sign     Worried About Running Out of Food in the Last Year: Never true     Ran Out of Food in the Last Year: Never true   Transportation Needs: Unknown (2024)    PRAPARE - Transportation     Lack of Transportation (Medical): Not on file     Lack of Transportation (Non-Medical): No   Physical Activity: Inactive (2023)    Exercise Vital Sign     Days of Exercise per Week: 0 days     Minutes of Exercise per Session: 0 min   Stress: Not on file   Social Connections: Not on file   Intimate Partner Violence: Not on file   Housing Stability: Unknown (2024)    Housing Stability Vital Sign     Unable to Pay for Housing in the Last Year: Not on file     Number of Times Moved in the Last Year: Not on file     Homeless in the Last Year: No     Family History   Problem Relation Age of Onset    Diabetes Mother     High Blood Pressure Mother     Heart Disease Mother     Diabetes Brother     High Blood Pressure Brother     Heart

## 2024-11-21 NOTE — PATIENT INSTRUCTIONS
Decrease Triamterene-Hydrochlorothiazide from 75/50 mg daily to 37.5/25 mg daily  - cut your current pills in half. Once these are gone, you should have  NEW prescription for the 37.5/25 mg pills and take 1 daily.

## 2024-11-27 NOTE — ASSESSMENT & PLAN NOTE
Asymptomatic.  On no antiarrhythmics due to bradycardia.  Remains on Coumadin for anticoagulation.  Most recent INR was therapeutic.  Continue current Coumadin dose.  Unfortunately, patient is not able to do a home POCT INR machine as, due to neuropathy, it is very difficult to get blood on fingersticks from her.

## 2024-11-27 NOTE — ASSESSMENT & PLAN NOTE
Blood pressure is running a little bit low.  Decrease Maxide from 75/50 mg daily to 37.5/25 mg daily (patient reports being hospitalized when Maxide was discontinued in the past).  Continue amlodipine 5 mg daily and Benicar 20 mg daily.  Consider discontinuing amlodipine if blood pressure remains low.

## 2024-11-27 NOTE — ASSESSMENT & PLAN NOTE
Most recent INR was 11/18/2024 and therapeutic at 2.47.  Continue Coumadin 5 mg 5 days/week and 2.5 mg 2 days/week.

## 2024-11-27 NOTE — ASSESSMENT & PLAN NOTE
Stable.  Decrease Maxide from 75/50 mg daily to 37.5/25 mg daily and see if that helps stabilize her blood pressure.  Continue Benicar 20 mg daily and amlodipine 5 mg daily.  Consider discontinuing amlodipine if blood pressure remains low.  Check CMP.

## 2024-12-02 DIAGNOSIS — G60.9 IDIOPATHIC PERIPHERAL NEUROPATHY: Primary | ICD-10-CM

## 2024-12-02 DIAGNOSIS — M48.062 SPINAL STENOSIS OF LUMBAR REGION WITH NEUROGENIC CLAUDICATION: ICD-10-CM

## 2024-12-02 RX ORDER — DULOXETIN HYDROCHLORIDE 60 MG/1
CAPSULE, DELAYED RELEASE ORAL
Qty: 90 CAPSULE | Refills: 3 | Status: SHIPPED | OUTPATIENT
Start: 2024-12-02

## 2024-12-03 DIAGNOSIS — I10 ESSENTIAL HYPERTENSION: ICD-10-CM

## 2024-12-03 DIAGNOSIS — E78.5 HYPERLIPIDEMIA LDL GOAL <100: ICD-10-CM

## 2024-12-03 DIAGNOSIS — R73.03 PRE-DIABETES: ICD-10-CM

## 2024-12-04 LAB
ALBUMIN SERPL-MCNC: 3.8 G/DL (ref 3.4–5)
ALBUMIN/GLOB SERPL: 1.3 {RATIO} (ref 1.1–2.2)
ALP SERPL-CCNC: 89 U/L (ref 40–129)
ALT SERPL-CCNC: 12 U/L (ref 10–40)
ANION GAP SERPL CALCULATED.3IONS-SCNC: 9 MMOL/L (ref 3–16)
AST SERPL-CCNC: 19 U/L (ref 15–37)
BASOPHILS # BLD: 0.1 K/UL (ref 0–0.2)
BASOPHILS NFR BLD: 0.7 %
BILIRUB SERPL-MCNC: 0.4 MG/DL (ref 0–1)
BUN SERPL-MCNC: 25 MG/DL (ref 7–20)
CALCIUM SERPL-MCNC: 9.6 MG/DL (ref 8.3–10.6)
CHLORIDE SERPL-SCNC: 109 MMOL/L (ref 99–110)
CHOLEST SERPL-MCNC: 151 MG/DL (ref 0–199)
CO2 SERPL-SCNC: 23 MMOL/L (ref 21–32)
CREAT SERPL-MCNC: 0.9 MG/DL (ref 0.6–1.2)
DEPRECATED RDW RBC AUTO: 13.8 % (ref 12.4–15.4)
EOSINOPHIL # BLD: 0.4 K/UL (ref 0–0.6)
EOSINOPHIL NFR BLD: 4.9 %
EST. AVERAGE GLUCOSE BLD GHB EST-MCNC: 128.4 MG/DL
GFR SERPLBLD CREATININE-BSD FMLA CKD-EPI: 58 ML/MIN/{1.73_M2}
GLUCOSE SERPL-MCNC: 113 MG/DL (ref 70–99)
HBA1C MFR BLD: 6.1 %
HCT VFR BLD AUTO: 39.3 % (ref 36–48)
HDLC SERPL-MCNC: 46 MG/DL (ref 40–60)
HGB BLD-MCNC: 12.9 G/DL (ref 12–16)
LDLC SERPL CALC-MCNC: 77 MG/DL
LYMPHOCYTES # BLD: 2.2 K/UL (ref 1–5.1)
LYMPHOCYTES NFR BLD: 24.5 %
MCH RBC QN AUTO: 31 PG (ref 26–34)
MCHC RBC AUTO-ENTMCNC: 32.8 G/DL (ref 31–36)
MCV RBC AUTO: 94.5 FL (ref 80–100)
MONOCYTES # BLD: 0.6 K/UL (ref 0–1.3)
MONOCYTES NFR BLD: 6.4 %
NEUTROPHILS # BLD: 5.7 K/UL (ref 1.7–7.7)
NEUTROPHILS NFR BLD: 63.5 %
PLATELET # BLD AUTO: 257 K/UL (ref 135–450)
PMV BLD AUTO: 9.6 FL (ref 5–10.5)
POTASSIUM SERPL-SCNC: 3.7 MMOL/L (ref 3.5–5.1)
PROT SERPL-MCNC: 6.7 G/DL (ref 6.4–8.2)
RBC # BLD AUTO: 4.16 M/UL (ref 4–5.2)
SODIUM SERPL-SCNC: 141 MMOL/L (ref 136–145)
TRIGL SERPL-MCNC: 138 MG/DL (ref 0–150)
TSH SERPL DL<=0.005 MIU/L-ACNC: 1.88 UIU/ML (ref 0.27–4.2)
VLDLC SERPL CALC-MCNC: 28 MG/DL
WBC # BLD AUTO: 9 K/UL (ref 4–11)

## 2024-12-19 DIAGNOSIS — Z79.01 ANTICOAGULATED ON COUMADIN: ICD-10-CM

## 2024-12-19 LAB
INR PPP: 2.15 (ref 0.85–1.15)
PROTHROMBIN TIME: 24 SEC (ref 11.9–14.9)

## 2025-01-01 ENCOUNTER — RESULTS FOLLOW-UP (OUTPATIENT)
Dept: INTERNAL MEDICINE CLINIC | Age: 89
End: 2025-01-01

## 2025-01-02 ENCOUNTER — OFFICE VISIT (OUTPATIENT)
Dept: INTERNAL MEDICINE CLINIC | Age: 89
End: 2025-01-02

## 2025-01-02 VITALS
DIASTOLIC BLOOD PRESSURE: 75 MMHG | SYSTOLIC BLOOD PRESSURE: 100 MMHG | WEIGHT: 178 LBS | HEART RATE: 53 BPM | BODY MASS INDEX: 29.66 KG/M2 | HEIGHT: 65 IN | OXYGEN SATURATION: 97 %

## 2025-01-02 DIAGNOSIS — R35.0 URINARY FREQUENCY: ICD-10-CM

## 2025-01-02 DIAGNOSIS — I10 ESSENTIAL HYPERTENSION: Primary | ICD-10-CM

## 2025-01-02 ASSESSMENT — PATIENT HEALTH QUESTIONNAIRE - PHQ9
SUM OF ALL RESPONSES TO PHQ QUESTIONS 1-9: 0
SUM OF ALL RESPONSES TO PHQ9 QUESTIONS 1 & 2: 0
SUM OF ALL RESPONSES TO PHQ QUESTIONS 1-9: 0
2. FEELING DOWN, DEPRESSED OR HOPELESS: NOT AT ALL
1. LITTLE INTEREST OR PLEASURE IN DOING THINGS: NOT AT ALL
SUM OF ALL RESPONSES TO PHQ QUESTIONS 1-9: 0
SUM OF ALL RESPONSES TO PHQ QUESTIONS 1-9: 0

## 2025-01-02 NOTE — ASSESSMENT & PLAN NOTE
Blood pressure stable, remains on low side however no change from patient's baseline, continue current meds and follow-up with Dr. Johnston as scheduled

## 2025-01-02 NOTE — ASSESSMENT & PLAN NOTE
urine analysis done in office is negative for a urinary tract infection, advised to ensure adequate water intake, patient reporting good appetite and denies any change from baseline, son aware to call the office if any significant change from baseline or any new symptoms otherwise she will follow-up with Dr. Johnston as scheduled in March

## 2025-01-08 DIAGNOSIS — I10 ESSENTIAL HYPERTENSION: ICD-10-CM

## 2025-01-08 DIAGNOSIS — E78.5 HYPERLIPIDEMIA, UNSPECIFIED HYPERLIPIDEMIA TYPE: ICD-10-CM

## 2025-01-08 RX ORDER — OLMESARTAN MEDOXOMIL 20 MG/1
TABLET ORAL
Qty: 90 TABLET | Refills: 3 | Status: SHIPPED | OUTPATIENT
Start: 2025-01-08

## 2025-01-08 RX ORDER — AMLODIPINE BESYLATE 5 MG/1
TABLET ORAL
Qty: 90 TABLET | Refills: 3 | Status: SHIPPED | OUTPATIENT
Start: 2025-01-08

## 2025-01-08 RX ORDER — POTASSIUM CHLORIDE 750 MG/1
CAPSULE, EXTENDED RELEASE ORAL
Qty: 270 CAPSULE | Refills: 3 | Status: SHIPPED | OUTPATIENT
Start: 2025-01-08

## 2025-01-30 DIAGNOSIS — Z79.01 ANTICOAGULATED ON COUMADIN: Primary | ICD-10-CM

## 2025-01-30 DIAGNOSIS — Z79.01 ANTICOAGULATED ON COUMADIN: ICD-10-CM

## 2025-01-30 LAB
INR PPP: 1.8 (ref 0.85–1.15)
PROTHROMBIN TIME: 21 SEC (ref 11.9–14.9)

## 2025-02-26 DIAGNOSIS — Z79.01 ANTICOAGULATED ON COUMADIN: ICD-10-CM

## 2025-02-27 LAB
INR PPP: 2.32 (ref 0.85–1.15)
PROTHROMBIN TIME: 25.4 SEC (ref 11.9–14.9)

## 2025-03-03 ENCOUNTER — TELEPHONE (OUTPATIENT)
Dept: INTERNAL MEDICINE CLINIC | Age: 89
End: 2025-03-03

## 2025-03-03 ENCOUNTER — NURSE ONLY (OUTPATIENT)
Dept: INTERNAL MEDICINE CLINIC | Age: 89
End: 2025-03-03
Payer: MEDICARE

## 2025-03-03 DIAGNOSIS — R30.0 DYSURIA: Primary | ICD-10-CM

## 2025-03-03 LAB
BILIRUBIN, POC: NORMAL
BLOOD URINE, POC: NORMAL
CLARITY, POC: NORMAL
COLOR, POC: NORMAL
GLUCOSE URINE, POC: NORMAL MG/DL
KETONES, POC: NORMAL MG/DL
LEUKOCYTE EST, POC: NORMAL
NITRITE, POC: NORMAL
PH, POC: 7.5
PROTEIN, POC: NORMAL MG/DL
SPECIFIC GRAVITY, POC: 1.02
UROBILINOGEN, POC: 0.2 MG/DL

## 2025-03-03 PROCEDURE — 81002 URINALYSIS NONAUTO W/O SCOPE: CPT | Performed by: INTERNAL MEDICINE

## 2025-03-03 SDOH — ECONOMIC STABILITY: FOOD INSECURITY
WITHIN THE PAST 12 MONTHS, YOU WORRIED THAT YOUR FOOD WOULD RUN OUT BEFORE YOU GOT MONEY TO BUY MORE.: PATIENT UNABLE TO ANSWER

## 2025-03-03 SDOH — ECONOMIC STABILITY: FOOD INSECURITY
WITHIN THE PAST 12 MONTHS, THE FOOD YOU BOUGHT JUST DIDN'T LAST AND YOU DIDN'T HAVE MONEY TO GET MORE.: PATIENT UNABLE TO ANSWER

## 2025-03-03 NOTE — TELEPHONE ENCOUNTER
Did discuss INR lab results. Dr. Johnston would like the patient from 2-3 so her INR is perfect for this week. Patient has been confused and having Urinary Frequency. Patient's son did leave urine sample in sterile cup at the office.    GLU neg  Rodo neg  KET neg  SG 1.020  BLO neg  PH 7.5  PRO neg  URO 0.2  NIT neg  GINNY neg    Did send off culture for the patient

## 2025-03-03 NOTE — TELEPHONE ENCOUNTER
Pt son Itz calling---to talk to you about pt labs and a few other things! Please call him when you ca Dr Johnston--Thanks.

## 2025-03-05 LAB — BACTERIA UR CULT: NORMAL

## 2025-03-18 ENCOUNTER — OFFICE VISIT (OUTPATIENT)
Dept: INTERNAL MEDICINE CLINIC | Age: 89
End: 2025-03-18

## 2025-03-18 VITALS
BODY MASS INDEX: 29.62 KG/M2 | DIASTOLIC BLOOD PRESSURE: 60 MMHG | SYSTOLIC BLOOD PRESSURE: 98 MMHG | HEIGHT: 65 IN | HEART RATE: 58 BPM

## 2025-03-18 DIAGNOSIS — Z79.01 ANTICOAGULATED ON COUMADIN: ICD-10-CM

## 2025-03-18 DIAGNOSIS — E11.9 TYPE 2 DIABETES MELLITUS WITHOUT COMPLICATION, WITHOUT LONG-TERM CURRENT USE OF INSULIN: ICD-10-CM

## 2025-03-18 DIAGNOSIS — I10 ESSENTIAL HYPERTENSION: Primary | ICD-10-CM

## 2025-03-18 DIAGNOSIS — R44.1 HALLUCINATIONS, VISUAL: ICD-10-CM

## 2025-03-18 DIAGNOSIS — M48.062 SPINAL STENOSIS OF LUMBAR REGION WITH NEUROGENIC CLAUDICATION: ICD-10-CM

## 2025-03-18 DIAGNOSIS — R73.03 PRE-DIABETES: ICD-10-CM

## 2025-03-18 DIAGNOSIS — G60.9 IDIOPATHIC PERIPHERAL NEUROPATHY: ICD-10-CM

## 2025-03-18 DIAGNOSIS — E78.5 HYPERLIPIDEMIA LDL GOAL <100: ICD-10-CM

## 2025-03-18 DIAGNOSIS — I50.20 SYSTOLIC CONGESTIVE HEART FAILURE, UNSPECIFIED HF CHRONICITY (HCC): ICD-10-CM

## 2025-03-18 DIAGNOSIS — I48.11 LONGSTANDING PERSISTENT ATRIAL FIBRILLATION (HCC): ICD-10-CM

## 2025-03-18 RX ORDER — DULOXETIN HYDROCHLORIDE 60 MG/1
60 CAPSULE, DELAYED RELEASE ORAL DAILY
Qty: 90 CAPSULE | Refills: 3
Start: 2025-03-18

## 2025-03-18 RX ORDER — OFLOXACIN 3 MG/ML
1 SOLUTION/ DROPS OPHTHALMIC 3 TIMES DAILY
COMMUNITY

## 2025-03-18 RX ORDER — SODIUM CHLORIDE 20 MG/ML
1 SOLUTION OPHTHALMIC 2 TIMES DAILY
COMMUNITY

## 2025-03-18 RX ORDER — LATANOPROST 50 UG/ML
1 SOLUTION/ DROPS OPHTHALMIC NIGHTLY
COMMUNITY

## 2025-03-18 NOTE — PROGRESS NOTES
Action Requested: Summary for Provider     []  Critical Lab, Recommendations Needed  [] Need Additional Advice  []   FYI    []   Need Orders  [] Need Medications Sent to Pharmacy  []  Other     SUMMARY: Per protocol: OV. Patient would like to try melatonin over the counter first and if it does not help will call back for an appointment.     Reason for call: Insomnia  Onset: Data Unavailable    Patient has been having trouble sleeping for the last week. He denies other symptoms but would like to know what he can try over the counter.     Reason for Disposition   Insomnia persists > 1 week and following Insomnia Care Advice    Protocols used: Insomnia-A-OH    
dorzolamide (TRUSOPT) 2 % ophthalmic solution Place 1 drop into both eyes 3 times daily      acetaminophen (TYLENOL) 500 MG tablet Take 2 tablets by mouth three times daily      Glucosamine-Chondroit-Vit C-Mn (GLUCOSAMINE CHONDR 500 COMPLEX PO) Take by mouth       MULTIPLE VITAMIN PO Take by mouth        rivaroxaban (XARELTO) 20 MG TABS tablet Take 1 tablet by mouth daily (with breakfast) 90 tablet 1     No current facility-administered medications for this visit.       Return in about 3 months (around 6/18/2025).

## 2025-03-18 NOTE — PATIENT INSTRUCTIONS
Stop amlodipine  Stop pravastatin  Stop duloxetine 30 mg. Continue duloxetine 60 mg.     If you do get Xarelto, stop warfarin. You will need to be off of warfarin for 5 days before you start Xarelto.   - the advantage of Xarelto is that we will no longer need to check your INR's and you may eat whatever you like.

## 2025-03-19 DIAGNOSIS — I48.11 LONGSTANDING PERSISTENT ATRIAL FIBRILLATION (HCC): ICD-10-CM

## 2025-03-22 PROBLEM — E11.9 TYPE 2 DIABETES MELLITUS WITHOUT COMPLICATION, WITHOUT LONG-TERM CURRENT USE OF INSULIN: Status: ACTIVE | Noted: 2025-03-22

## 2025-03-22 PROBLEM — R73.03 PRE-DIABETES: Status: RESOLVED | Noted: 2023-04-16 | Resolved: 2025-03-22

## 2025-03-28 ENCOUNTER — TELEPHONE (OUTPATIENT)
Dept: INTERNAL MEDICINE CLINIC | Age: 89
End: 2025-03-28

## 2025-03-28 DIAGNOSIS — I48.11 LONGSTANDING PERSISTENT ATRIAL FIBRILLATION (HCC): ICD-10-CM

## 2025-03-28 NOTE — TELEPHONE ENCOUNTER
Ok to stay  with Coumadin, same dose. If they would like to try to PAP, I can have Tato Blust reach out to them   Can they please confirm her coumadin dose?

## 2025-03-28 NOTE — TELEPHONE ENCOUNTER
Spoke with patient's son they are concerned about the cost. They are asking if the patient could stay on Coumadin? I did go over the deducible with the patient's son. Also went over Patient Assistance and the RX program to help get it for free for the patient. He would like to still stick with Coumadin at this time.

## 2025-03-28 NOTE — TELEPHONE ENCOUNTER
Patient's son Itz is calling, states that patient is starting Xarelto. Gilson Mobley has questions about getting the prescription started and would like a call back. Please advise.

## 2025-03-28 NOTE — TELEPHONE ENCOUNTER
Xarelto put back on her med list. Let me know how they would like to proceed after this 3 months.

## 2025-04-09 RX ORDER — TRIAMTERENE AND HYDROCHLOROTHIAZIDE 37.5; 25 MG/1; MG/1
1 TABLET ORAL DAILY
Qty: 90 TABLET | Refills: 1 | Status: SHIPPED | OUTPATIENT
Start: 2025-04-09

## 2025-04-09 NOTE — TELEPHONE ENCOUNTER
Last OV: 3/18/2025  Next OV: 7/1/2025    Next appointment due:6/18/2025    Last fill:11/21/2024  Refills:1    OK to fill

## 2025-04-16 ENCOUNTER — TELEPHONE (OUTPATIENT)
Dept: INTERNAL MEDICINE CLINIC | Age: 89
End: 2025-04-16

## 2025-04-16 ENCOUNTER — OFFICE VISIT (OUTPATIENT)
Dept: INTERNAL MEDICINE CLINIC | Age: 89
End: 2025-04-16

## 2025-04-16 VITALS
OXYGEN SATURATION: 98 % | BODY MASS INDEX: 24.49 KG/M2 | DIASTOLIC BLOOD PRESSURE: 84 MMHG | WEIGHT: 147 LBS | HEIGHT: 65 IN | SYSTOLIC BLOOD PRESSURE: 126 MMHG | HEART RATE: 72 BPM

## 2025-04-16 DIAGNOSIS — M79.605 LEFT LEG PAIN: Primary | ICD-10-CM

## 2025-04-16 RX ORDER — DULOXETIN HYDROCHLORIDE 30 MG/1
CAPSULE, DELAYED RELEASE ORAL
Qty: 90 CAPSULE | Refills: 0
Start: 2025-04-16

## 2025-04-16 RX ORDER — ACETAMINOPHEN AND CODEINE PHOSPHATE 300; 15 MG/1; MG/1
1 TABLET ORAL 4 TIMES DAILY PRN
Qty: 28 TABLET | Refills: 0 | Status: SHIPPED | OUTPATIENT
Start: 2025-04-16 | End: 2025-04-23

## 2025-04-16 NOTE — TELEPHONE ENCOUNTER
Left message for patient's son to return my call. I did ask Rosalva if we were able to use one of Dr Queen's HFU for today and she said it was okay. If the patient's son calls back please schedule them for today.

## 2025-04-16 NOTE — TELEPHONE ENCOUNTER
Son Baljinder calling in for pt. Pt has edema and swelling to leg x 1 week, getting worse. He is aware  is out of the office. He was advised an appt is recommended for assessment and proper treatment.    Son declined scheduling and asked for a call from [facility].

## 2025-04-16 NOTE — PROGRESS NOTES
2025    Kailyn Johnson (:  1927) is a 89 y.o. female, here for evaluation of the following chief complaint(s):  Leg Swelling (Leg swelling and constant pain (rate 8/10) x 1 week /No injury reported )    Time of visit 38 minutes    ASSESSMENT/PLAN:    1. Left leg pain  Need to rule out stable hip fracture or a pelvic fracture.  Asked that they get their x-rays done as soon as possible stat.  May need placement if she continues to be unable to walk.  If she has a fracture, acute hospitalization will be recommended.  She has tramadol listed as causing some dizziness and morphine is causing nausea.  Therefore will prescribe a very low-dose of Tylenol with codeine.  Son was made aware that this could also cause increased confusion or constipation.  Recommend she go back up on duloxetine to the 90 mg a day.  It is possible this was helping as she has some neuropathic pain reported and that the lower dose has contributed to an increase in pain again.    - acetaminophen-Codeine (TYLENOL #2) 300-15 MG per tablet; Take 1 tablet by mouth 4 times daily as needed for Pain for up to 7 days. Max Daily Amount: 4 tablets  Dispense: 28 tablet; Refill: 0  - XR HIP 2-3 VW W PELVIS LEFT; Future  - XR KNEE LEFT (3 VIEWS); Future  - DULoxetine (CYMBALTA) 30 MG extended release capsule; TAKE 1 CAPSULE DAILY (TOTAL DOSE 90 MG DAILY).  Dispense: 90 capsule; Refill: 0        FOLLOW UP:  Call or return to clinic prn if these symptoms worsen or fail to improve as anticipated.      HPI    Here with her son Itz.  Patient has about 1 week of increased left thigh pain.  It occurs from the left knee up to the left hip or groin.  She normally walks around her home with a walker but in the past week she is unable to do this because the pain is severe.  Her family has put her in a wheelchair and they are helping move her from place to place.  Her son Itz tells me that she slept in a recliner for years and years.  This is not new.

## 2025-04-17 ENCOUNTER — HOSPITAL ENCOUNTER (OUTPATIENT)
Dept: GENERAL RADIOLOGY | Age: 89
Discharge: HOME | End: 2025-04-17
Payer: MEDICARE

## 2025-04-17 ENCOUNTER — HOSPITAL ENCOUNTER (OUTPATIENT)
Age: 89
Discharge: HOME | End: 2025-04-17
Payer: MEDICARE

## 2025-04-17 ENCOUNTER — RESULTS FOLLOW-UP (OUTPATIENT)
Dept: INTERNAL MEDICINE CLINIC | Age: 89
End: 2025-04-17

## 2025-04-17 DIAGNOSIS — M79.605 LEFT LEG PAIN: ICD-10-CM

## 2025-04-17 PROCEDURE — 73502 X-RAY EXAM HIP UNI 2-3 VIEWS: CPT

## 2025-04-17 PROCEDURE — 73562 X-RAY EXAM OF KNEE 3: CPT

## 2025-04-17 PROCEDURE — 73560 X-RAY EXAM OF KNEE 1 OR 2: CPT

## 2025-04-17 NOTE — TELEPHONE ENCOUNTER
----- Message from Dr. Clari Queen MD sent at 4/17/2025  4:45 PM EDT -----  Call her son Itz:  x-ray shows a lot of arthritis in the left knee and spine.  Not much arthritis in the hip.  Nothing fractured.  If she does not improve with the pain pills and and getting back up on the dose of duloxetine,  may need to have her evaluated at orthopedic office.

## 2025-04-17 NOTE — RESULT ENCOUNTER NOTE
Call her son Itz:  x-ray shows a lot of arthritis in the left knee and spine.  Not much arthritis in the hip.  Nothing fractured.  If she does not improve with the pain pills and and getting back up on the dose of duloxetine,  may need to have her evaluated at orthopedic office.

## 2025-04-28 ENCOUNTER — OFFICE VISIT (OUTPATIENT)
Dept: INTERNAL MEDICINE CLINIC | Age: 89
End: 2025-04-28

## 2025-04-28 VITALS — DIASTOLIC BLOOD PRESSURE: 48 MMHG | SYSTOLIC BLOOD PRESSURE: 116 MMHG | OXYGEN SATURATION: 99 % | HEART RATE: 62 BPM

## 2025-04-28 DIAGNOSIS — R44.1 HALLUCINATIONS, VISUAL: Primary | ICD-10-CM

## 2025-04-28 DIAGNOSIS — F33.41 RECURRENT MAJOR DEPRESSIVE DISORDER, IN PARTIAL REMISSION: ICD-10-CM

## 2025-04-28 DIAGNOSIS — G60.9 IDIOPATHIC PERIPHERAL NEUROPATHY: ICD-10-CM

## 2025-04-28 DIAGNOSIS — M79.652 LEFT THIGH PAIN: ICD-10-CM

## 2025-04-28 DIAGNOSIS — L89.151 PRESSURE ULCER OF COCCYGEAL REGION, STAGE 1: ICD-10-CM

## 2025-04-28 DIAGNOSIS — R41.81 AGE-RELATED COGNITIVE DECLINE: ICD-10-CM

## 2025-04-28 DIAGNOSIS — S81.812A SKIN TEAR OF LEFT LOWER LEG WITHOUT COMPLICATION, INITIAL ENCOUNTER: ICD-10-CM

## 2025-04-28 NOTE — PROGRESS NOTES
Physical Activity: Inactive (7/17/2023)    Exercise Vital Sign     Days of Exercise per Week: 0 days     Minutes of Exercise per Session: 0 min   Stress: Not on file   Social Connections: Not on file   Intimate Partner Violence: Not on file   Housing Stability: Patient Unable To Answer (3/3/2025)    Housing Stability Vital Sign     Unable to Pay for Housing in the Last Year: Patient unable to answer     Number of Times Moved in the Last Year: 0     Homeless in the Last Year: Patient unable to answer     Family History   Problem Relation Age of Onset    Diabetes Mother     High Blood Pressure Mother     Heart Disease Mother     Diabetes Brother     High Blood Pressure Brother     Heart Disease Brother     Cancer Brother     Rheum Arthritis Daughter         fibromayalgia    Cancer Brother         prostate cancer     Heart Attack Brother         Outpatient Medications Prior to Visit   Medication Sig Dispense Refill    DULoxetine (CYMBALTA) 30 MG extended release capsule TAKE 1 CAPSULE DAILY (TOTAL DOSE 90 MG DAILY). 90 capsule 0    triamterene-hydroCHLOROthiazide (MAXZIDE-25) 37.5-25 MG per tablet TAKE 1 TABLET EVERY DAY 90 tablet 1    rivaroxaban (XARELTO) 20 MG TABS tablet Take 1 tablet by mouth daily (with breakfast) 90 tablet 1    DULoxetine (CYMBALTA) 60 MG extended release capsule Take 1 capsule by mouth daily 90 capsule 3    sodium chloride (RAMOS 128) 2 % ophthalmic solution Place 1 drop into both eyes 2 times daily      latanoprost (XALATAN) 0.005 % ophthalmic solution Place 1 drop into both eyes nightly      ofloxacin (OCUFLOX) 0.3 % solution Place 1 drop into the left eye 3 times daily      olmesartan (BENICAR) 20 MG tablet TAKE 1 TABLET EVERY DAY 90 tablet 3    potassium chloride (MICRO-K) 10 MEQ extended release capsule TAKE 1 CAPSULE THREE TIMES DAILY 270 capsule 3    Incontinence Supply Disposable (DEPEND UNDERGARMENTS) MISC 1 each by Does not apply route 3 times daily as needed (soilage) 90 each 5

## 2025-04-28 NOTE — PATIENT INSTRUCTIONS
[facility]  [address], [place] [address]  Phone: [phone]

## 2025-05-03 PROBLEM — M79.652 LEFT THIGH PAIN: Status: ACTIVE | Noted: 2025-05-03

## 2025-05-03 PROBLEM — S81.812A SKIN TEAR OF LEFT LOWER LEG WITHOUT COMPLICATION: Status: ACTIVE | Noted: 2025-05-03

## 2025-05-03 PROBLEM — R41.81 AGE-RELATED COGNITIVE DECLINE: Status: ACTIVE | Noted: 2025-05-03

## 2025-05-03 PROBLEM — L89.151 PRESSURE ULCER OF COCCYGEAL REGION, STAGE 1: Status: ACTIVE | Noted: 2025-05-03

## 2025-05-03 PROBLEM — F33.41 RECURRENT MAJOR DEPRESSIVE DISORDER, IN PARTIAL REMISSION: Status: ACTIVE | Noted: 2025-05-03

## 2025-05-03 NOTE — ASSESSMENT & PLAN NOTE
Workup was unremarkable.  Has resolved since increasing Cymbalta to 90 mg daily.   DISPLAY PLAN FREE TEXT

## 2025-05-03 NOTE — ASSESSMENT & PLAN NOTE
Did not tolerate weaning Cymbalta to 60 mg daily.  She then developed left thigh pain which resolved by resuming the additional 30 mg of Cymbalta.  Continue Cymbalta 90 mg daily.

## 2025-05-03 NOTE — ASSESSMENT & PLAN NOTE
Patient and patient's son report worsening of hallucinations over the past few months.  Hallucinations are not frightening for patient and she is aware that they are hallucinations.  Patient's family has noticed some increased confusion over the past several months.  They are considering moving her into assisted living which may be helpful from a depression standpoint as well as an hallucinations standpoint and provide her with more social interaction.

## 2025-05-03 NOTE — ASSESSMENT & PLAN NOTE
Patient's family has noticed some increased confusion and some overall cognitive decline.  This is reasonable given her age and progressive macular degeneration with vision loss.  They are considering assisted living.  I have referred them to [facility] to get a list of facilities.

## 2025-05-03 NOTE — ASSESSMENT & PLAN NOTE
Order provided for pressure relieving cushion to be placed in patient's recliner.  Patient's son applies a barrier cream (Desitin) twice daily and she should continue to do so.

## 2025-05-03 NOTE — ASSESSMENT & PLAN NOTE
Patient is back on Cymbalta 90 mg daily.  She had increased neuropathic pain when Cymbalta was decreased to 60 mg daily.  Depression is stable.  Continue Cymbalta 90 mg daily.

## 2025-05-09 ENCOUNTER — TELEPHONE (OUTPATIENT)
Dept: INTERNAL MEDICINE CLINIC | Age: 89
End: 2025-05-09

## 2025-05-09 NOTE — TELEPHONE ENCOUNTER
Josemanuel huff [facility] is calling asking for verbal orders for OT --week 1 --1 time a week--2nd week --3 times week--3rd week 2 times for the week---he can be reached at [phone].  Thanks.

## 2025-05-30 ENCOUNTER — HOSPITAL ENCOUNTER (EMERGENCY)
Age: 89
Discharge: HOME | End: 2025-05-30
Attending: STUDENT IN AN ORGANIZED HEALTH CARE EDUCATION/TRAINING PROGRAM
Payer: MEDICARE

## 2025-05-30 ENCOUNTER — APPOINTMENT (OUTPATIENT)
Dept: CT IMAGING | Age: 89
End: 2025-05-30
Payer: MEDICARE

## 2025-05-30 ENCOUNTER — APPOINTMENT (OUTPATIENT)
Dept: GENERAL RADIOLOGY | Age: 89
End: 2025-05-30
Payer: MEDICARE

## 2025-05-30 ENCOUNTER — TELEPHONE (OUTPATIENT)
Dept: INTERNAL MEDICINE CLINIC | Age: 89
End: 2025-05-30

## 2025-05-30 ENCOUNTER — APPOINTMENT (OUTPATIENT)
Dept: CT IMAGING | Age: 89
End: 2025-05-30
Attending: STUDENT IN AN ORGANIZED HEALTH CARE EDUCATION/TRAINING PROGRAM
Payer: MEDICARE

## 2025-05-30 VITALS
SYSTOLIC BLOOD PRESSURE: 161 MMHG | BODY MASS INDEX: 27.4 KG/M2 | OXYGEN SATURATION: 96 % | WEIGHT: 185 LBS | TEMPERATURE: 98 F | DIASTOLIC BLOOD PRESSURE: 70 MMHG | HEART RATE: 67 BPM | RESPIRATION RATE: 18 BRPM | HEIGHT: 69 IN

## 2025-05-30 DIAGNOSIS — M53.9 MULTILEVEL DEGENERATIVE DISC DISEASE: ICD-10-CM
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
## 2025-05-30 DIAGNOSIS — S09.90XA INJURY OF HEAD, INITIAL ENCOUNTER: ICD-10-CM

## 2025-05-30 PROCEDURE — 72131 CT LUMBAR SPINE W/O DYE: CPT

## 2025-05-30 PROCEDURE — 70450 CT HEAD/BRAIN W/O DYE: CPT

## 2025-05-30 PROCEDURE — 72125 CT NECK SPINE W/O DYE: CPT

## 2025-05-30 PROCEDURE — 72128 CT CHEST SPINE W/O DYE: CPT

## 2025-05-30 PROCEDURE — 73521 X-RAY EXAM HIPS BI 2 VIEWS: CPT

## 2025-05-30 PROCEDURE — 99284 EMERGENCY DEPT VISIT MOD MDM: CPT

## 2025-05-30 PROCEDURE — 73070 X-RAY EXAM OF ELBOW: CPT

## 2025-05-30 RX ORDER — BACITRACIN ZINC AND POLYMYXIN B SULFATE 500; 1000 [USP'U]/G; [USP'U]/G
OINTMENT TOPICAL
Qty: 15 G | Refills: 1 | Status: SHIPPED | OUTPATIENT
Start: 2025-05-30 | End: 2025-06-06

## 2025-05-30 ASSESSMENT — PAIN DESCRIPTION - DESCRIPTORS: DESCRIPTORS: ACHING

## 2025-05-30 ASSESSMENT — PAIN - FUNCTIONAL ASSESSMENT
PAIN_FUNCTIONAL_ASSESSMENT: PREVENTS OR INTERFERES SOME ACTIVE ACTIVITIES AND ADLS
PAIN_FUNCTIONAL_ASSESSMENT: 0-10

## 2025-05-30 ASSESSMENT — PAIN DESCRIPTION - ORIENTATION: ORIENTATION: RIGHT;LEFT

## 2025-05-30 ASSESSMENT — PAIN SCALES - GENERAL: PAINLEVEL_OUTOF10: 3

## 2025-05-30 ASSESSMENT — PAIN DESCRIPTION - PAIN TYPE: TYPE: ACUTE PAIN

## 2025-05-30 ASSESSMENT — PAIN DESCRIPTION - LOCATION: LOCATION: BUTTOCKS

## 2025-05-30 NOTE — TELEPHONE ENCOUNTER
Nurse Cherry from [facility] reporting that pt had a fall in the bathroom this morning and it was reported pt did hit her head.    She assessed pt. No hematomas, knots or bruising noted to head. Small skin tear to BEATRICE. Area cleansed, alginate and non-adherent dressing applied and was wrapped. No other injuries noted. No c/o pain. VSS.    Concussion protocols went over with family and they were advised to monitor pt closely the next few days.    No call back needed but Cherry may be reached at [phone].

## 2025-05-30 NOTE — TELEPHONE ENCOUNTER
Spoke with Cherry and let her know that we are sending the patient to the ER. The patient is on a blood thinner. Spoke with patients son and he taking the patient to the ER.

## 2025-05-30 NOTE — ED PROVIDER NOTES
[facility] EMERGENCY DEPARTMENT  EMERGENCY DEPARTMENT ENCOUNTER        Pt Name: Kailyn Johnson  MRN: [id number]  Birthdate 7/26/1927  Date of evaluation: 5/30/2025  Provider: Davida Gaston PA-C  PCP: Montse Johnston DO  Note Started: 2:03 PM EDT 5/30/25       I have seen and evaluated this patient with my supervising physician Kenyon Breaux MD.      CHIEF COMPLAINT       Chief Complaint   Patient presents with    Fall     Pt was walking into the bathroom, pants fell down, when she tried to kick them out of the way fell backwards hit her head and butt, skin tear to left arm.  On blood thinners       HISTORY OF PRESENT ILLNESS: 1 or more Elements     History From: Patient, son            Chief Complaint: Fall    Kailyn Johnson is a 89 y.o. female who presents after a fall that occurred around 8 AM this morning.  Patient states that she was trying to walk to the bathroom with her walker, she was trying to get her pants down and kick them off, and she states she kicked too hard and made herself fell backwards.  She states that she hit the wall, and then the floor.  She is on blood thinners.  She states that she hit her head, but, left elbow.  She reports having a skin tear on her left arm.  She states the nurse came to help her with her wounds, when she called her doctor, they wanted her to come to the emergency department for head CT.  She states that her life alert went off when she fell, family came to get her 10 to 15 minutes later.  She denies any loss of consciousness.  She states she feels fine now, she has pain in her lower back, hips.  Son states that this is a chronic issue and does not think this is new today.  She reports that she landed on her right forearm, denies any pain to this currently.    Nursing Notes were all reviewed and agreed with or any disagreements were addressed in the HPI.    REVIEW OF SYSTEMS :      Review of Systems    Positives and Pertinent negatives as per HPI.  D/C, Shared Decision Making, Pt Expectation of Test or Tx.): Patient seen, case discussed, plan agreed upon by attending physician. Patient is stable for discharge. They are not tachycardic or hypoxic, they are afebrile.   Follow up with PCP. Return to the emergency department with worsening signs and symptoms. Discharge timeout completed using discharge CMT.            The patient tolerated their visit well.  The patient and / or the family were informed of the results of any tests, a time was given to answer questions, a plan was proposed and they agreed with plan.      I estimate there is LOW risk for SKULL FRACTURE, INTRACRANIAL HEMORRHAGE, CERVICAL SPINE INJURY, SUBDURAL OR EPIDURAL HEMATOMA,     I completed a structured, evidence-based clinical evaluation to screen for neurologic deficits in this patient. The patient has a normal detailed neurologic exam    I feel the patient can be safely discharged to home with outpatient follow up.  Instructions have been given for the patient to return if there is any significant worsening of the headache or the development of confusion, vision change, weakness, numbness, difficulty with speech or walking, or any other concerns     I estimate there is LOW risk for intracranial hemorrhage or edema, subdural or epidural hematoma, cauda equina or central cord syndrome, cord compression, compartment syndrome, tendon or neurovascular injury, pneumothorax, hemothorax, pericardial tamponade, cardiac contusion, thoracic aortic dissection, intra-abdominal injury or perforated bowel, thus I consider the discharge disposition reasonable.      I am the Primary Clinician of Record.  FINAL IMPRESSION      1. Fall, initial encounter    2. Injury of head, initial encounter    3. Multilevel degenerative disc disease          DISPOSITION/PLAN     DISPOSITION Decision To Discharge 05/30/2025 06:20:59 PM   DISPOSITION CONDITION Stable       PATIENT REFERRED TO:  Montse Johnston,   610

## 2025-05-31 NOTE — ED PROVIDER NOTES
This is my LUZ ELENA Supervisory and shared visit note:     This patient was seen by the advanced practice provider.     I personally saw the patient and made/approved the management plan and take responsibility for the patient management.      Briefly, 89 y.o. female presents with fall.  Patient states she was attempting to kick her pants [facility]itent[facility] up her walker when she accidentally lost her balance and fell.  Patient did hit her head.  Patient did not lose consciousness.  On the ground for 10 minutes.  Patient blood thinner use.  Patient denies nausea, vomiting, change in vision, dizziness, vertigo.  Patient also denies chest pain and shortness of breath.  Patient currently stays at assisted living facility.  When patient fell she activated her life alert.  Not on call doctor at nursing facility stated she would need to come to the emergency department for CT of head.    Focused exam:   Gen: awake, alert, and NAD  HEENT: NCAT. EOMI.   CV: RRR w/o MRG  Lungs: CTAB. No incr WOB.   Abdomen: Soft, nontender, nondistended. No rebound/guarding.   Neuro: Moving all extremities, fluent speech, follows commands, 5 strength in upper and lower extremity.  Cranial nerves II through XII intact.  MSK: Left elbow bandaged and wrapped.    MDM:   Patient is hemodynamically stable upon arrival to the emergency department.  Patient is afebrile.  Differential diagnosis includes but not limited to mechanical fall, syncope, intracranial bleed, skull fracture, elbow fracture.  Prior to me [facility] imaging ordered for patient.  Based on LUZ ELENA evaluation patient had tenderness to palpation and thoracic/lumbar region.  Given that patient fell and hit her head she was also ordered CT of head and cervical spine.  Given her tenderness on examination with LUZ ELENA evaluation we will include CT of thoracic /lumbar spine.  Patient also had a skin tear involving left elbow which was cleaned with normal saline and chlorhexidine.  Bacitracin was applied to

## 2025-06-06 ENCOUNTER — TELEPHONE (OUTPATIENT)
Dept: INTERNAL MEDICINE CLINIC | Age: 89
End: 2025-06-06

## 2025-06-06 NOTE — TELEPHONE ENCOUNTER
Pt son Itz calling back with some f/u questions about pt going to nursing facility---asking to speak to [facility] who he has already spoken to about this----Thanks.

## 2025-06-09 ENCOUNTER — TELEPHONE (OUTPATIENT)
Dept: INTERNAL MEDICINE CLINIC | Age: 89
End: 2025-06-09

## 2025-06-09 NOTE — TELEPHONE ENCOUNTER
Spoke with patient's son and he wanted to add OTC to medication list for assisted living.  Magnesium   Cranberry  Vitamin B-12  Vitamin D  Patient would like Glucosamine-Chondroit removed from list.

## 2025-06-09 NOTE — TELEPHONE ENCOUNTER
Pt son Itz calling [facility] back---wants to talk to her only---Thanks. Please call him back.  Thanks.

## 2025-06-10 NOTE — TELEPHONE ENCOUNTER
Please confirm the doses that she is taking.   I have pended   B-12 1000 mcg daily,   cranberry 500 mg BID,   vit D3 1000 units daily,   MagOx 400 mg BID.

## 2025-06-13 RX ORDER — ADHESIVE TAPE 3"X 2.3 YD
500 TAPE, NON-MEDICATED TOPICAL 2 TIMES DAILY
COMMUNITY
Start: 2025-06-13

## 2025-06-13 RX ORDER — MAGNESIUM OXIDE 400 MG/1
400 TABLET ORAL 2 TIMES DAILY
COMMUNITY
Start: 2025-06-13

## 2025-06-13 RX ORDER — CHOLECALCIFEROL (VITAMIN D3) 25 MCG
1000 TABLET ORAL DAILY
COMMUNITY
Start: 2025-06-13

## 2025-06-13 RX ORDER — LANOLIN ALCOHOL/MO/W.PET/CERES
1000 CREAM (GRAM) TOPICAL DAILY
COMMUNITY
Start: 2025-06-13

## 2025-06-13 NOTE — TELEPHONE ENCOUNTER
Spoke to Itz (patients son) He stated that he will be out of town for 2 weeks and didn't have the info with him. He stated that he will get back with the info in a couple of days.

## 2025-06-16 ENCOUNTER — TELEPHONE (OUTPATIENT)
Dept: INTERNAL MEDICINE CLINIC | Age: 89
End: 2025-06-16

## 2025-06-16 RX ORDER — MAGNESIUM OXIDE 400 MG/1
400 TABLET ORAL DAILY
COMMUNITY
Start: 2025-06-16

## 2025-06-16 RX ORDER — ADHESIVE TAPE 3"X 2.3 YD
4200 TAPE, NON-MEDICATED TOPICAL DAILY
COMMUNITY
Start: 2025-06-16

## 2025-06-16 NOTE — TELEPHONE ENCOUNTER
Patient's son (Itz) called to confirm meds again    Magnesium 400mg QD  Cranberry 4,200mg QD  Multivitamin no dosage given  B12 1000mcg QD  Vit D3 1000 IU25, 125 mcg QD  Roseanne Eye Drops Both eyes BID

## 2025-06-24 ENCOUNTER — TELEPHONE (OUTPATIENT)
Dept: INTERNAL MEDICINE CLINIC | Age: 89
End: 2025-06-24

## 2025-06-24 ENCOUNTER — CLINICAL SUPPORT (OUTPATIENT)
Dept: INTERNAL MEDICINE CLINIC | Age: 89
End: 2025-06-24
Payer: MEDICARE

## 2025-06-24 DIAGNOSIS — R30.0 DYSURIA: Primary | ICD-10-CM

## 2025-06-24 LAB
BILIRUBIN, POC: NORMAL
BLOOD URINE, POC: NORMAL
CLARITY, POC: NORMAL
COLOR, POC: NORMAL
GLUCOSE URINE, POC: NORMAL MG/DL
KETONES, POC: NORMAL MG/DL
LEUKOCYTE EST, POC: NORMAL
NITRITE, POC: NORMAL
PH, POC: 6
PROTEIN, POC: NORMAL MG/DL
SPECIFIC GRAVITY, POC: 1.02
UROBILINOGEN, POC: 0.2 MG/DL

## 2025-06-24 PROCEDURE — 81002 URINALYSIS NONAUTO W/O SCOPE: CPT | Performed by: INTERNAL MEDICINE

## 2025-06-24 NOTE — TELEPHONE ENCOUNTER
Urine Results     GLU Neg  KHOA Neg  Ket Neg  SG 1.025  BLO Neg  PH 6.0  PRO Neg  URO 0.2  NIT Neg  GINNY Neg

## 2025-06-24 NOTE — TELEPHONE ENCOUNTER
Pt's son & daughter in law dropped off urine. Belives pt has a UTI- symptoms include frequency, odor, confusion. Unsure how long it's been going on, just got with pt yesterday. Urine placed in lab by Dr. Johnston & pt put on walk in schedule.

## 2025-06-25 LAB — BACTERIA UR CULT: NORMAL

## 2025-06-25 NOTE — TELEPHONE ENCOUNTER
Did discuss with patient's son Itz. He is the only one on her communication form. He is going to reach out to the other family members.

## 2025-06-28 DIAGNOSIS — I48.11 LONGSTANDING PERSISTENT ATRIAL FIBRILLATION (HCC): ICD-10-CM

## 2025-06-30 NOTE — TELEPHONE ENCOUNTER
Last OV: 6/24/2025  Next OV: 7/1/2025    Next appointment due:9/02/2025    Last fill:3/28/2025  Refills:1    OK to fill

## 2025-07-01 ENCOUNTER — OFFICE VISIT (OUTPATIENT)
Dept: INTERNAL MEDICINE CLINIC | Age: 89
End: 2025-07-01

## 2025-07-01 VITALS
DIASTOLIC BLOOD PRESSURE: 72 MMHG | SYSTOLIC BLOOD PRESSURE: 140 MMHG | HEIGHT: 69 IN | BODY MASS INDEX: 27.32 KG/M2 | HEART RATE: 77 BPM | OXYGEN SATURATION: 96 %

## 2025-07-01 DIAGNOSIS — Z87.820 HISTORY OF TRAUMATIC BRAIN INJURY: ICD-10-CM

## 2025-07-01 DIAGNOSIS — H40.9 GLAUCOMA OF BOTH EYES, UNSPECIFIED GLAUCOMA TYPE: ICD-10-CM

## 2025-07-01 DIAGNOSIS — I10 ESSENTIAL HYPERTENSION: ICD-10-CM

## 2025-07-01 DIAGNOSIS — H54.8 LEGALLY BLIND: ICD-10-CM

## 2025-07-01 DIAGNOSIS — I48.11 LONGSTANDING PERSISTENT ATRIAL FIBRILLATION (HCC): ICD-10-CM

## 2025-07-01 DIAGNOSIS — R44.3 HALLUCINATIONS: ICD-10-CM

## 2025-07-01 DIAGNOSIS — M17.0 PRIMARY OSTEOARTHRITIS OF BOTH KNEES: ICD-10-CM

## 2025-07-01 DIAGNOSIS — I50.20 SYSTOLIC CONGESTIVE HEART FAILURE, UNSPECIFIED HF CHRONICITY (HCC): ICD-10-CM

## 2025-07-01 DIAGNOSIS — L89.151 PRESSURE ULCER OF COCCYGEAL REGION, STAGE 1: ICD-10-CM

## 2025-07-01 DIAGNOSIS — M48.062 SPINAL STENOSIS OF LUMBAR REGION WITH NEUROGENIC CLAUDICATION: ICD-10-CM

## 2025-07-01 DIAGNOSIS — F33.41 RECURRENT MAJOR DEPRESSIVE DISORDER, IN PARTIAL REMISSION: ICD-10-CM

## 2025-07-01 DIAGNOSIS — H91.90 HOH (HARD OF HEARING): ICD-10-CM

## 2025-07-01 DIAGNOSIS — M50.00 HERNIATED NUCLEUS PULPOSUS WITH MYELOPATHY, CERVICAL: ICD-10-CM

## 2025-07-01 DIAGNOSIS — E78.5 HYPERLIPIDEMIA LDL GOAL <100: ICD-10-CM

## 2025-07-01 DIAGNOSIS — R41.81 AGE-RELATED COGNITIVE DECLINE: ICD-10-CM

## 2025-07-01 DIAGNOSIS — G60.9 IDIOPATHIC PERIPHERAL NEUROPATHY: ICD-10-CM

## 2025-07-01 DIAGNOSIS — E11.9 TYPE 2 DIABETES MELLITUS WITHOUT COMPLICATION, WITHOUT LONG-TERM CURRENT USE OF INSULIN (HCC): Primary | ICD-10-CM

## 2025-07-01 DIAGNOSIS — Z91.81 AT HIGH RISK FOR FALLS: ICD-10-CM

## 2025-07-01 PROBLEM — Z79.01 ANTICOAGULATED ON COUMADIN: Status: RESOLVED | Noted: 2023-10-30 | Resolved: 2025-07-01

## 2025-07-01 PROBLEM — R60.0 LOWER EXTREMITY EDEMA: Status: RESOLVED | Noted: 2022-03-28 | Resolved: 2025-07-01

## 2025-07-01 PROBLEM — R53.83 FATIGUE: Status: RESOLVED | Noted: 2022-10-09 | Resolved: 2025-07-01

## 2025-07-01 PROBLEM — M79.652 LEFT THIGH PAIN: Status: RESOLVED | Noted: 2025-05-03 | Resolved: 2025-07-01

## 2025-07-01 PROBLEM — S81.812A SKIN TEAR OF LEFT LOWER LEG WITHOUT COMPLICATION: Status: RESOLVED | Noted: 2025-05-03 | Resolved: 2025-07-01

## 2025-07-01 PROBLEM — B36.9 FUNGAL DERMATITIS: Status: RESOLVED | Noted: 2024-11-21 | Resolved: 2025-07-01

## 2025-07-01 PROBLEM — R01.1 MURMUR: Status: RESOLVED | Noted: 2024-02-05 | Resolved: 2025-07-01

## 2025-07-01 NOTE — ASSESSMENT & PLAN NOTE
Patient is stable on Cymbalta 90 mg daily.  She had increased neuropathic pain when Cymbalta was decreased to 60 mg daily.

## 2025-07-01 NOTE — ASSESSMENT & PLAN NOTE
Asymptomatic.  On no antiarrhythmics due to bradycardia.  Now on Xarelto for anticoagulation. Was previously on Coumadin but changed d/t difficulty with obtaining INR's. Xarelto is affordable.

## 2025-07-01 NOTE — ASSESSMENT & PLAN NOTE
Well controlled. Continue Benicar 20 mg daily and Maxide 37.5/25 mg daily (does not tolerate stopping Maxide d/t swelling in legs and h/o CHF).

## 2025-07-01 NOTE — ASSESSMENT & PLAN NOTE
Stable.  Remains on Maxide 37.5/25 mg daily.  Blood pressure is running low and we will discontinue amlodipine.  Continue olmesartan 20 mg daily.  Patient reports swelling if she is not on a diuretic.

## 2025-07-01 NOTE — PROGRESS NOTES
Patient: Kailyn Johnson is a 89 y.o. female who presents today with the following Chief Complaint(s):  Chief Complaint   Patient presents with    Follow-up     3 months       History of Present Illness  The patient presents for her final follow-up before moving into assisted living.    Frequent Falls  - The patient has frequent falls, leading to the decision to transition her to an assisted living facility.    Vision Loss  - She cannot self-administer medications due to vision loss from glaucoma and macular degeneration.  - She continues to see her eye doctor and uses prescribed eye drops.    Assistance with Daily Activities  - She requires occasional assistance with daily activities such as eating, bathing, grooming, and toileting.  - Maintaining an upright posture while taking medication prevents choking incidents.    Congestive Heart Failure  - She has a history of congestive heart failure, managed with Maxide and olmesartan.  - Maxide prevents leg swelling, and she reports no shortness of breath.    Depression  - Depression is managed with duloxetine 90 mg daily.    Spinal Stenosis  - She has spinal stenosis, surgically repaired, and uses Tylenol and Cymbalta for pain.    Ulcer  - An ulcer on her bottom is treated with Desitin and a cushion.    Arthritis  - Arthritis in both knees likely impairs her mobility.    Heart Murmur  - A murmur was discovered last year, but no echo was performed due to her age.    Neuropathy  - Neuropathy is managed with Cymbalta 90 mg daily.    Hearing Loss  - She has hearing aids for hearing loss.    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    Herniated Disk  - A herniated disk in her neck may contribute to her neuropathy and is part of the reason she takes duloxetine.    Atrial Fibrillation  - She has atrial fibrillation, not treated due to a slow heart rate.  - She is on Xarelto for stroke prevention with A-fib.    High Blood Pressure  -

## 2025-07-01 NOTE — ASSESSMENT & PLAN NOTE
Has both visual and auditory hallucinations. Hallucinations have increased recently with increased stress. Not bothered by the hallucinations so we have not treated them.

## 2025-07-01 NOTE — ASSESSMENT & PLAN NOTE
Diet controlled with recent hemoglobin A1c of 6.1% in March 2025.  Given advanced age, no indication to monitor unless she is symptomatic for hyperglycemia.

## 2025-07-01 NOTE — ASSESSMENT & PLAN NOTE
Continue with Tylenol as needed for pain control.  Has seen Dr. Martinez who has injected her knees with cortisone and Synvisc.  Does use Voltaren gel as needed.

## 2025-07-01 NOTE — ASSESSMENT & PLAN NOTE
Remains on Tylenol and duloxetine 90 mg daily for pain control. Did not tolerate weaning of duloxetine when attempted in 2024.

## 2025-07-30 ENCOUNTER — TELEPHONE (OUTPATIENT)
Dept: INTERNAL MEDICINE CLINIC | Age: 89
End: 2025-07-30

## 2025-07-30 NOTE — TELEPHONE ENCOUNTER
Itz patient's son is calling wanting to let the Dr. Johnston and staff to know his mother the patient passed away on 7/25/2025 at [facility]. Patient's son wasn't sure what the ER doctor was talking about with his mother's labs. Stating her she maybe had a infection in her bloodstream.